# Patient Record
Sex: MALE | Race: WHITE | NOT HISPANIC OR LATINO | Employment: OTHER | URBAN - METROPOLITAN AREA
[De-identification: names, ages, dates, MRNs, and addresses within clinical notes are randomized per-mention and may not be internally consistent; named-entity substitution may affect disease eponyms.]

---

## 2020-05-28 ENCOUNTER — TELEPHONE (OUTPATIENT)
Dept: OBGYN CLINIC | Facility: CLINIC | Age: 80
End: 2020-05-28

## 2020-07-06 ENCOUNTER — OFFICE VISIT (OUTPATIENT)
Dept: OBGYN CLINIC | Facility: CLINIC | Age: 80
End: 2020-07-06
Payer: COMMERCIAL

## 2020-07-06 VITALS
SYSTOLIC BLOOD PRESSURE: 156 MMHG | HEIGHT: 70 IN | DIASTOLIC BLOOD PRESSURE: 86 MMHG | WEIGHT: 188 LBS | TEMPERATURE: 99.5 F | BODY MASS INDEX: 26.92 KG/M2

## 2020-07-06 DIAGNOSIS — M17.11 PRIMARY OSTEOARTHRITIS OF RIGHT KNEE: Primary | ICD-10-CM

## 2020-07-06 PROCEDURE — 99213 OFFICE O/P EST LOW 20 MIN: CPT | Performed by: ORTHOPAEDIC SURGERY

## 2020-07-06 PROCEDURE — 20610 DRAIN/INJ JOINT/BURSA W/O US: CPT | Performed by: ORTHOPAEDIC SURGERY

## 2020-07-06 RX ORDER — METHYLPREDNISOLONE ACETATE 40 MG/ML
2 INJECTION, SUSPENSION INTRA-ARTICULAR; INTRALESIONAL; INTRAMUSCULAR; SOFT TISSUE
Status: COMPLETED | OUTPATIENT
Start: 2020-07-06 | End: 2020-07-06

## 2020-07-06 RX ORDER — LIDOCAINE HYDROCHLORIDE 10 MG/ML
4 INJECTION, SOLUTION INFILTRATION; PERINEURAL
Status: COMPLETED | OUTPATIENT
Start: 2020-07-06 | End: 2020-07-06

## 2020-07-06 RX ORDER — ASPIRIN 81 MG/1
TABLET, CHEWABLE ORAL
COMMUNITY
End: 2022-04-21 | Stop reason: HOSPADM

## 2020-07-06 RX ORDER — PRAVASTATIN SODIUM 40 MG
TABLET ORAL
COMMUNITY

## 2020-07-06 RX ADMIN — LIDOCAINE HYDROCHLORIDE 4 ML: 10 INJECTION, SOLUTION INFILTRATION; PERINEURAL at 11:00

## 2020-07-06 RX ADMIN — METHYLPREDNISOLONE ACETATE 2 ML: 40 INJECTION, SUSPENSION INTRA-ARTICULAR; INTRALESIONAL; INTRAMUSCULAR; SOFT TISSUE at 11:00

## 2020-07-06 NOTE — PROGRESS NOTES
Assessment/Plan:  Assessment/Plan   There are no diagnoses linked to this encounter  Discussed today's physical exam findings with patient  His findings are consistent with flare up of primary osteoarthritis of the right knee  Patient elects to continue with conservative management via corticosteroid injection  Injection was provided without difficulty  Patient will be seen for follow-up in 3 months for repeat evaluation and consideration for repeat injection if necessary  Subjective:   Patient ID: Vikash Walsh is a 78 y o  male  HPI  Patient presents for follow-up evaluation of right knee primary osteoarthritis  Patient reports that over the last couple weeks he has had a return of symptoms, mainly pain with weight-bearing activity  He denies any bruising, swelling, numbness, tingling, or locking  He reports occasional feelings of weakness  He presents for routine quarterly steroid injection  The following portions of the patient's history were reviewed and updated as appropriate: allergies, current medications, past family history, past medical history, past social history, past surgical history and problem list     There is no problem list on file for this patient  Past Medical History:   Diagnosis Date    Hx of blood clots     Hyperlipidemia     Hypertension      Past Surgical History:   Procedure Laterality Date    KNEE ARTHROSCOPY Right 07/31/2015    PROSTATE SURGERY       History reviewed  No pertinent family history      Social History     Socioeconomic History    Marital status: /Civil Union     Spouse name: None    Number of children: None    Years of education: None    Highest education level: None   Occupational History    None   Social Needs    Financial resource strain: None    Food insecurity:     Worry: None     Inability: None    Transportation needs:     Medical: None     Non-medical: None   Tobacco Use    Smoking status: Former Smoker     Last attempt to quit: 1979     Years since quittin 5    Smokeless tobacco: Never Used   Substance and Sexual Activity    Alcohol use: Not Currently    Drug use: Not Currently    Sexual activity: None   Lifestyle    Physical activity:     Days per week: None     Minutes per session: None    Stress: None   Relationships    Social connections:     Talks on phone: None     Gets together: None     Attends Jewish service: None     Active member of club or organization: None     Attends meetings of clubs or organizations: None     Relationship status: None    Intimate partner violence:     Fear of current or ex partner: None     Emotionally abused: None     Physically abused: None     Forced sexual activity: None   Other Topics Concern    None   Social History Narrative    None       Current Outpatient Medications:     aspirin 81 mg chewable tablet, TAKE 81MG BY MOUTH EVERY DAY, Disp: , Rfl:     pravastatin (PRAVACHOL) 40 mg tablet, TAKE ONE TABLET BY MOUTH AT BEDTIME INSTEAD OF FLUVASTATIN FOR CHOLESTEROL, Disp: , Rfl:     Allergies no known allergies      Review of Systems   Constitutional: Negative for chills, fever and unexpected weight change  HENT: Negative for hearing loss, nosebleeds and sore throat  Eyes: Negative for pain, redness and visual disturbance  Respiratory: Negative for cough, shortness of breath and wheezing  Cardiovascular: Negative for chest pain, palpitations and leg swelling  Gastrointestinal: Negative for abdominal pain, nausea and vomiting  Endocrine: Negative for polydipsia and polyuria  Genitourinary: Negative for dysuria and hematuria  Musculoskeletal:        As noted in HPI   Skin: Negative for rash and wound  Neurological: Negative for dizziness, numbness and headaches  Psychiatric/Behavioral: Negative for decreased concentration and suicidal ideas  The patient is not nervous/anxious          Objective:  /86 (BP Location: Right arm, Patient Position: Sitting, Cuff Size: Large)   Temp 99 5 °F (37 5 °C)   Ht 5' 10" (1 778 m)   Wt 85 3 kg (188 lb)   BMI 26 98 kg/m²     Ortho Exam   Right knee -   No obvious anatomical deformity  Skin is warm dry to touch with no signs of erythema, ecchymosis, infection  Patella tracks centrally with palpable crepitus  Mild medial and lateral joint line tenderness  Knee is stable to varus, valgus, anterior, posterior stress  Calf compartments are soft  2+ TP pulse  Sensation light touch intact distally    Physical Exam   Constitutional: He is oriented to person, place, and time  He appears well-developed and well-nourished  HENT:   Right Ear: External ear normal    Left Ear: External ear normal    Nose: Nose normal    Eyes: Pupils are equal, round, and reactive to light  Conjunctivae and EOM are normal    Neck: Normal range of motion  Cardiovascular: Intact distal pulses  Pulmonary/Chest: Effort normal    Musculoskeletal:    As noted in HPI   Neurological: He is alert and oriented to person, place, and time  Skin: Skin is warm and dry  Psychiatric: He has a normal mood and affect   His behavior is normal  Judgment and thought content normal        No new imaging reviewed this visit     Large joint arthrocentesis: R knee  Date/Time: 7/6/2020 11:00 AM  Consent given by: patient  Site marked: site marked  Supporting Documentation  Indications: pain   Procedure Details  Location: knee - R knee  Needle size: 22 G  Ultrasound guidance: no  Approach: anterolateral  Medications administered: 4 mL lidocaine 1 %; 2 mL methylPREDNISolone acetate 40 mg/mL    Patient tolerance: patient tolerated the procedure well with no immediate complications  Dressing:  Sterile dressing applied          Scribe Attestation    I,:   Juan C Tidwell am acting as a scribe while in the presence of the attending physician :        I,:   Beauford Brittle, DO personally performed the services described in this documentation    as scribed in my presence :

## 2020-10-05 ENCOUNTER — OFFICE VISIT (OUTPATIENT)
Dept: OBGYN CLINIC | Facility: CLINIC | Age: 80
End: 2020-10-05
Payer: COMMERCIAL

## 2020-10-05 VITALS
TEMPERATURE: 97.5 F | SYSTOLIC BLOOD PRESSURE: 160 MMHG | BODY MASS INDEX: 26.92 KG/M2 | DIASTOLIC BLOOD PRESSURE: 78 MMHG | HEIGHT: 70 IN | WEIGHT: 188 LBS

## 2020-10-05 DIAGNOSIS — M70.62 TROCHANTERIC BURSITIS OF LEFT HIP: Primary | ICD-10-CM

## 2020-10-05 PROCEDURE — 99213 OFFICE O/P EST LOW 20 MIN: CPT | Performed by: ORTHOPAEDIC SURGERY

## 2020-10-05 PROCEDURE — 20610 DRAIN/INJ JOINT/BURSA W/O US: CPT | Performed by: ORTHOPAEDIC SURGERY

## 2020-10-05 RX ORDER — MAGNESIUM 30 MG
30 TABLET ORAL DAILY
COMMUNITY

## 2020-10-05 RX ORDER — METHYLPREDNISOLONE ACETATE 40 MG/ML
2 INJECTION, SUSPENSION INTRA-ARTICULAR; INTRALESIONAL; INTRAMUSCULAR; SOFT TISSUE
Status: COMPLETED | OUTPATIENT
Start: 2020-10-05 | End: 2020-10-05

## 2020-10-05 RX ORDER — OMEGA-3S/DHA/EPA/FISH OIL/D3 300MG-1000
800 CAPSULE ORAL DAILY
COMMUNITY

## 2020-10-05 RX ORDER — BUPIVACAINE HYDROCHLORIDE 2.5 MG/ML
4 INJECTION, SOLUTION INFILTRATION; PERINEURAL
Status: COMPLETED | OUTPATIENT
Start: 2020-10-05 | End: 2020-10-05

## 2020-10-05 RX ADMIN — BUPIVACAINE HYDROCHLORIDE 4 ML: 2.5 INJECTION, SOLUTION INFILTRATION; PERINEURAL at 08:44

## 2020-10-05 RX ADMIN — METHYLPREDNISOLONE ACETATE 2 ML: 40 INJECTION, SUSPENSION INTRA-ARTICULAR; INTRALESIONAL; INTRAMUSCULAR; SOFT TISSUE at 08:44

## 2020-12-22 ENCOUNTER — OFFICE VISIT (OUTPATIENT)
Dept: OBGYN CLINIC | Facility: CLINIC | Age: 80
End: 2020-12-22
Payer: COMMERCIAL

## 2020-12-22 ENCOUNTER — APPOINTMENT (OUTPATIENT)
Dept: RADIOLOGY | Facility: CLINIC | Age: 80
End: 2020-12-22
Payer: COMMERCIAL

## 2020-12-22 VITALS
TEMPERATURE: 98 F | HEART RATE: 57 BPM | SYSTOLIC BLOOD PRESSURE: 173 MMHG | HEIGHT: 70 IN | WEIGHT: 188 LBS | DIASTOLIC BLOOD PRESSURE: 75 MMHG | BODY MASS INDEX: 26.92 KG/M2

## 2020-12-22 DIAGNOSIS — M54.2 CERVICALGIA: Primary | ICD-10-CM

## 2020-12-22 DIAGNOSIS — M54.2 CERVICALGIA: ICD-10-CM

## 2020-12-22 PROCEDURE — 72040 X-RAY EXAM NECK SPINE 2-3 VW: CPT

## 2020-12-22 PROCEDURE — 99214 OFFICE O/P EST MOD 30 MIN: CPT | Performed by: FAMILY MEDICINE

## 2021-02-04 ENCOUNTER — OFFICE VISIT (OUTPATIENT)
Dept: FAMILY MEDICINE CLINIC | Facility: CLINIC | Age: 81
End: 2021-02-04
Payer: COMMERCIAL

## 2021-02-04 VITALS
DIASTOLIC BLOOD PRESSURE: 74 MMHG | SYSTOLIC BLOOD PRESSURE: 148 MMHG | WEIGHT: 185 LBS | BODY MASS INDEX: 28.04 KG/M2 | TEMPERATURE: 98 F | HEART RATE: 58 BPM | HEIGHT: 68 IN | OXYGEN SATURATION: 96 % | RESPIRATION RATE: 18 BRPM

## 2021-02-04 DIAGNOSIS — R73.9 HYPERGLYCEMIA: ICD-10-CM

## 2021-02-04 DIAGNOSIS — E78.5 HYPERLIPIDEMIA, UNSPECIFIED HYPERLIPIDEMIA TYPE: ICD-10-CM

## 2021-02-04 DIAGNOSIS — R25.1 TREMOR OF BOTH HANDS: ICD-10-CM

## 2021-02-04 DIAGNOSIS — I10 ESSENTIAL HYPERTENSION: ICD-10-CM

## 2021-02-04 DIAGNOSIS — Z13.29 THYROID DISORDER SCREEN: ICD-10-CM

## 2021-02-04 DIAGNOSIS — Z76.89 ENCOUNTER TO ESTABLISH CARE WITH NEW DOCTOR: Primary | ICD-10-CM

## 2021-02-04 DIAGNOSIS — Z13.0 SCREENING FOR DEFICIENCY ANEMIA: ICD-10-CM

## 2021-02-04 PROCEDURE — 1100F PTFALLS ASSESS-DOCD GE2>/YR: CPT | Performed by: FAMILY MEDICINE

## 2021-02-04 PROCEDURE — 3725F SCREEN DEPRESSION PERFORMED: CPT | Performed by: FAMILY MEDICINE

## 2021-02-04 PROCEDURE — 3288F FALL RISK ASSESSMENT DOCD: CPT | Performed by: FAMILY MEDICINE

## 2021-02-04 PROCEDURE — 99203 OFFICE O/P NEW LOW 30 MIN: CPT | Performed by: FAMILY MEDICINE

## 2021-02-04 RX ORDER — LISINOPRIL AND HYDROCHLOROTHIAZIDE 25; 20 MG/1; MG/1
1 TABLET ORAL DAILY
COMMUNITY

## 2021-02-04 RX ORDER — DILTIAZEM HYDROCHLORIDE 240 MG/1
240 CAPSULE, EXTENDED RELEASE ORAL DAILY
COMMUNITY
End: 2021-09-08 | Stop reason: SDUPTHER

## 2021-02-04 RX ORDER — VITAMIN B COMPLEX
1 CAPSULE ORAL DAILY
COMMUNITY

## 2021-02-04 RX ORDER — CHLORAL HYDRATE 500 MG
2000 CAPSULE ORAL DAILY
COMMUNITY

## 2021-02-04 NOTE — PROGRESS NOTES
Assessment/Plan:         Diagnoses and all orders for this visit:    Encounter to establish care with new doctor    Essential hypertension  Comments:  controlled well, cpm    Hyperlipidemia, unspecified hyperlipidemia type  -     Lipid panel; Future    Tremor of both hands  -     TSH, 3rd generation with Free T4 reflex; Future  -     Vitamin B12; Future    Hyperglycemia  -     Comprehensive metabolic panel; Future  -     HEMOGLOBIN A1C W/ EAG ESTIMATION; Future    BMI 28 0-28 9,adult    Screening for deficiency anemia  -     CBC and differential; Future    Thyroid disorder screen  -     TSH, 3rd generation with Free T4 reflex; Future          Subjective:   Chief Complaint   Patient presents with   [address]     Wanted a PCP close to home, [facility] [address]  Would like COVID vaccine  Patient ID: Conrado Lane is a 78 y o  male  New pt   [facility] PCP through [place] system  last appt at [place] was about 3-4 months ago  last HbA1c 6 1% per pt   "Have floaters" - followed by eye doctor at [facility], "Woke up with a stiff neck and saw dr Casimiro Mccord, said it's arthritis"  "Had an ultrasound on my aorta about 10 years ago- he said it was ok"  Tremor noticeable r>l hand today during visit, pt admits present for about a year, not any worse, no dysfunction assoc, no workup per pt      The following portions of the patient's history were reviewed and updated as appropriate: allergies, current medications, past family history, past medical history, past social history, past surgical history and problem list     Review of Systems   All other systems reviewed and are negative  Objective:      /74 (BP Location: Left arm, Patient Position: Sitting)   Pulse 58   Temp 98 °F (36 7 °C)   Resp 18   Ht 5' 8" (1 727 m)   Wt 83 9 kg (185 lb)   SpO2 96%   BMI 28 13 kg/m²          Physical Exam  Vitals signs and nursing note reviewed     Constitutional:       General: He is not in acute distress  Appearance: He is well-developed  He is not ill-appearing, toxic-appearing or diaphoretic  HENT:      Head: Normocephalic and atraumatic  Eyes:      General: Lids are normal       Conjunctiva/sclera: Conjunctivae normal       Pupils: Pupils are equal, round, and reactive to light  Neck:      Musculoskeletal: Neck supple  Thyroid: No thyroid mass or thyromegaly  Vascular: No JVD  Trachea: Trachea normal    Cardiovascular:      Rate and Rhythm: Normal rate and regular rhythm  Pulses: Normal pulses  Heart sounds: S1 normal and S2 normal  Heart sounds are distant  No friction rub  No gallop  Pulmonary:      Effort: Pulmonary effort is normal       Breath sounds: Normal breath sounds  Abdominal:      General: Bowel sounds are normal  There is no distension or abdominal bruit  Palpations: Abdomen is soft  There is no hepatomegaly, splenomegaly or mass  Tenderness: There is no abdominal tenderness  Musculoskeletal:      Right lower leg: No edema  Left lower leg: No edema  Lymphadenopathy:      Cervical: No cervical adenopathy  Upper Body:      Right upper body: No supraclavicular adenopathy  Left upper body: No supraclavicular adenopathy  Skin:     General: Skin is warm and dry  Coloration: Skin is not pale  Neurological:      Mental Status: He is alert and oriented to person, place, and time  Motor: Tremor (R>L hand) present  Gait: Gait normal    Psychiatric:         Mood and Affect: Mood normal          Behavior: Behavior normal  Behavior is cooperative  BMI Counseling: Body mass index is 28 13 kg/m²  The BMI is above normal  Nutrition recommendations include 3-5 servings of fruits/vegetables daily  Exercise recommendations include exercising 3-5 times per week

## 2021-02-09 ENCOUNTER — IMMUNIZATIONS (OUTPATIENT)
Dept: FAMILY MEDICINE CLINIC | Facility: HOSPITAL | Age: 81
End: 2021-02-09

## 2021-02-09 DIAGNOSIS — Z23 ENCOUNTER FOR IMMUNIZATION: Primary | ICD-10-CM

## 2021-02-09 PROCEDURE — 0011A SARS-COV-2 / COVID-19 MRNA VACCINE (MODERNA) 100 MCG: CPT

## 2021-02-09 PROCEDURE — 91301 SARS-COV-2 / COVID-19 MRNA VACCINE (MODERNA) 100 MCG: CPT

## 2021-02-17 ENCOUNTER — LAB (OUTPATIENT)
Dept: LAB | Facility: CLINIC | Age: 81
End: 2021-02-17
Payer: COMMERCIAL

## 2021-02-17 DIAGNOSIS — E78.5 HYPERLIPIDEMIA, UNSPECIFIED HYPERLIPIDEMIA TYPE: ICD-10-CM

## 2021-02-17 DIAGNOSIS — R73.9 HYPERGLYCEMIA: ICD-10-CM

## 2021-02-17 DIAGNOSIS — Z13.0 SCREENING FOR DEFICIENCY ANEMIA: ICD-10-CM

## 2021-02-17 DIAGNOSIS — R25.1 TREMOR OF BOTH HANDS: ICD-10-CM

## 2021-02-17 DIAGNOSIS — Z13.29 THYROID DISORDER SCREEN: ICD-10-CM

## 2021-02-17 LAB
ALBUMIN SERPL BCP-MCNC: 3.8 G/DL (ref 3.5–5)
ALP SERPL-CCNC: 55 U/L (ref 46–116)
ALT SERPL W P-5'-P-CCNC: 27 U/L (ref 12–78)
ANION GAP SERPL CALCULATED.3IONS-SCNC: 4 MMOL/L (ref 4–13)
AST SERPL W P-5'-P-CCNC: 22 U/L (ref 5–45)
BASOPHILS # BLD AUTO: 0.12 THOUSANDS/ΜL (ref 0–0.1)
BASOPHILS NFR BLD AUTO: 1 % (ref 0–1)
BILIRUB SERPL-MCNC: 0.61 MG/DL (ref 0.2–1)
BUN SERPL-MCNC: 25 MG/DL (ref 5–25)
CALCIUM SERPL-MCNC: 9.6 MG/DL (ref 8.3–10.1)
CHLORIDE SERPL-SCNC: 105 MMOL/L (ref 100–108)
CHOLEST SERPL-MCNC: 160 MG/DL (ref 50–200)
CO2 SERPL-SCNC: 32 MMOL/L (ref 21–32)
CREAT SERPL-MCNC: 1.24 MG/DL (ref 0.6–1.3)
EOSINOPHIL # BLD AUTO: 1.47 THOUSAND/ΜL (ref 0–0.61)
EOSINOPHIL NFR BLD AUTO: 16 % (ref 0–6)
ERYTHROCYTE [DISTWIDTH] IN BLOOD BY AUTOMATED COUNT: 13.2 % (ref 11.6–15.1)
EST. AVERAGE GLUCOSE BLD GHB EST-MCNC: 126 MG/DL
GFR SERPL CREATININE-BSD FRML MDRD: 54 ML/MIN/1.73SQ M
GLUCOSE P FAST SERPL-MCNC: 105 MG/DL (ref 65–99)
HBA1C MFR BLD: 6 %
HCT VFR BLD AUTO: 49.3 % (ref 36.5–49.3)
HDLC SERPL-MCNC: 63 MG/DL
HGB BLD-MCNC: 16.1 G/DL (ref 12–17)
IMM GRANULOCYTES # BLD AUTO: 0.02 THOUSAND/UL (ref 0–0.2)
IMM GRANULOCYTES NFR BLD AUTO: 0 % (ref 0–2)
LDLC SERPL CALC-MCNC: 82 MG/DL (ref 0–100)
LYMPHOCYTES # BLD AUTO: 1.73 THOUSANDS/ΜL (ref 0.6–4.47)
LYMPHOCYTES NFR BLD AUTO: 19 % (ref 14–44)
MCH RBC QN AUTO: 33.5 PG (ref 26.8–34.3)
MCHC RBC AUTO-ENTMCNC: 32.7 G/DL (ref 31.4–37.4)
MCV RBC AUTO: 103 FL (ref 82–98)
MONOCYTES # BLD AUTO: 0.82 THOUSAND/ΜL (ref 0.17–1.22)
MONOCYTES NFR BLD AUTO: 9 % (ref 4–12)
NEUTROPHILS # BLD AUTO: 4.86 THOUSANDS/ΜL (ref 1.85–7.62)
NEUTS SEG NFR BLD AUTO: 55 % (ref 43–75)
NONHDLC SERPL-MCNC: 97 MG/DL
NRBC BLD AUTO-RTO: 0 /100 WBCS
PLATELET # BLD AUTO: 170 THOUSANDS/UL (ref 149–390)
PMV BLD AUTO: 11.9 FL (ref 8.9–12.7)
POTASSIUM SERPL-SCNC: 3.7 MMOL/L (ref 3.5–5.3)
PROT SERPL-MCNC: 7.1 G/DL (ref 6.4–8.2)
RBC # BLD AUTO: 4.81 MILLION/UL (ref 3.88–5.62)
SODIUM SERPL-SCNC: 141 MMOL/L (ref 136–145)
TRIGL SERPL-MCNC: 75 MG/DL
TSH SERPL DL<=0.05 MIU/L-ACNC: 3.18 UIU/ML (ref 0.36–3.74)
VIT B12 SERPL-MCNC: 825 PG/ML (ref 100–900)
WBC # BLD AUTO: 9.02 THOUSAND/UL (ref 4.31–10.16)

## 2021-02-17 PROCEDURE — 36415 COLL VENOUS BLD VENIPUNCTURE: CPT

## 2021-02-17 PROCEDURE — 80053 COMPREHEN METABOLIC PANEL: CPT

## 2021-02-17 PROCEDURE — 84443 ASSAY THYROID STIM HORMONE: CPT

## 2021-02-17 PROCEDURE — 85025 COMPLETE CBC W/AUTO DIFF WBC: CPT

## 2021-02-17 PROCEDURE — 82607 VITAMIN B-12: CPT

## 2021-02-17 PROCEDURE — 80061 LIPID PANEL: CPT

## 2021-02-17 PROCEDURE — 83036 HEMOGLOBIN GLYCOSYLATED A1C: CPT

## 2021-03-03 ENCOUNTER — OFFICE VISIT (OUTPATIENT)
Dept: FAMILY MEDICINE CLINIC | Facility: CLINIC | Age: 81
End: 2021-03-03
Payer: COMMERCIAL

## 2021-03-03 VITALS
HEIGHT: 68 IN | DIASTOLIC BLOOD PRESSURE: 72 MMHG | WEIGHT: 184 LBS | BODY MASS INDEX: 27.89 KG/M2 | SYSTOLIC BLOOD PRESSURE: 122 MMHG | TEMPERATURE: 98.1 F | OXYGEN SATURATION: 94 % | HEART RATE: 58 BPM

## 2021-03-03 DIAGNOSIS — I10 ESSENTIAL HYPERTENSION: ICD-10-CM

## 2021-03-03 DIAGNOSIS — Z00.00 MEDICARE ANNUAL WELLNESS VISIT, SUBSEQUENT: Primary | ICD-10-CM

## 2021-03-03 DIAGNOSIS — E78.5 HYPERLIPIDEMIA, UNSPECIFIED HYPERLIPIDEMIA TYPE: ICD-10-CM

## 2021-03-03 DIAGNOSIS — R73.9 HYPERGLYCEMIA: ICD-10-CM

## 2021-03-03 PROCEDURE — 1125F AMNT PAIN NOTED PAIN PRSNT: CPT | Performed by: FAMILY MEDICINE

## 2021-03-03 PROCEDURE — 3078F DIAST BP <80 MM HG: CPT | Performed by: FAMILY MEDICINE

## 2021-03-03 PROCEDURE — 3074F SYST BP LT 130 MM HG: CPT | Performed by: FAMILY MEDICINE

## 2021-03-03 PROCEDURE — 1160F RVW MEDS BY RX/DR IN RCRD: CPT | Performed by: FAMILY MEDICINE

## 2021-03-03 PROCEDURE — 3288F FALL RISK ASSESSMENT DOCD: CPT | Performed by: FAMILY MEDICINE

## 2021-03-03 PROCEDURE — G0439 PPPS, SUBSEQ VISIT: HCPCS | Performed by: FAMILY MEDICINE

## 2021-03-03 PROCEDURE — 1170F FXNL STATUS ASSESSED: CPT | Performed by: FAMILY MEDICINE

## 2021-03-03 PROCEDURE — 1036F TOBACCO NON-USER: CPT | Performed by: FAMILY MEDICINE

## 2021-03-03 PROCEDURE — 99214 OFFICE O/P EST MOD 30 MIN: CPT | Performed by: FAMILY MEDICINE

## 2021-03-03 NOTE — PROGRESS NOTES
Assessment and Plan:     Problem List Items Addressed This Visit     None           Preventive health issues were discussed with patient, and age appropriate screening tests were ordered as noted in patient's After Visit Summary  Personalized health advice and appropriate referrals for health education or preventive services given if needed, as noted in patient's After Visit Summary       History of Present Illness:     Patient presents for Medicare Annual Wellness visit    Patient Care Team:  Wilfred Chisholm DO as PCP - General (Family Medicine)     Problem List:     Patient Active Problem List   Diagnosis    Trochanteric bursitis of left hip    Hypertension    Hyperlipidemia    History of deep venous thrombosis (DVT) of distal vein of right lower extremity    Hyperglycemia    Tremor of both hands    BMI 28 0-28 9,adult      Past Medical and Surgical History:     Past Medical History:   Diagnosis Date    Hx of blood clots     Hyperlipidemia     Hypertension      Past Surgical History:   Procedure Laterality Date    HERNIA REPAIR      KNEE ARTHROSCOPY Right 2015    PROSTATE SURGERY        Family History:     Family History   Problem Relation Age of Onset    Lung cancer Mother     Ulcers Father     No Known Problems Sister     Heart attack Brother     Alcohol abuse Brother     Kidney disease Brother       Social History:        Social History     Socioeconomic History    Marital status: /Civil Union     Spouse name: None    Number of children: None    Years of education: None    Highest education level: None   Occupational History    None   Social Needs    Financial resource strain: None    Food insecurity     Worry: None     Inability: None    Transportation needs     Medical: No     Non-medical: No   Tobacco Use    Smoking status: Former Smoker     Quit date:      Years since quittin 1    Smokeless tobacco: Never Used    Tobacco comment: 2+ppd x 20 years Substance and Sexual Activity    Alcohol use: Not Currently    Drug use: Not Currently    Sexual activity: Not Currently   Lifestyle    Physical activity     Days per week: None     Minutes per session: None    Stress: None   Relationships    Social connections     Talks on phone: None     Gets together: None     Attends Shinto service: None     Active member of club or organization: None     Attends meetings of clubs or organizations: None     Relationship status: None    Intimate partner violence     Fear of current or ex partner: None     Emotionally abused: None     Physically abused: None     Forced sexual activity: None   Other Topics Concern    None   Social History Narrative    Retired, lives with his wife, boubacar and Maame [employer] service [facility] [id number]      Medications and Allergies:     Current Outpatient Medications   Medication Sig Dispense Refill    ascorbic Acid (VITAMIN C) 500 MG CPCR Take 500 mg by mouth daily      aspirin 81 mg chewable tablet TAKE 81MG BY MOUTH EVERY DAY      b complex vitamins capsule Take 1 capsule by mouth daily      Boswellia-Glucosamine-Vit D (OSTEO BI-FLEX ONE PER DAY PO) Take by mouth      cholecalciferol (VITAMIN D3) 400 units tablet Take 800 Units by mouth daily       diltiazem (DILACOR XR) 240 MG 24 hr capsule Take 240 mg by mouth daily      lisinopril-hydrochlorothiazide (PRINZIDE,ZESTORETIC) 20-25 MG per tablet Take 1 tablet by mouth daily      magnesium 30 MG tablet Take 30 mg by mouth daily      Omega-3 Fatty Acids (fish oil) 1,000 mg Take 2,000 mg by mouth daily      Potassium 99 MG TABS Take 1 tablet by mouth daily      pravastatin (PRAVACHOL) 40 mg tablet TAKE ONE TABLET BY MOUTH AT BEDTIME INSTEAD OF FLUVASTATIN FOR CHOLESTEROL       No current facility-administered medications for this visit        No Known Allergies   Immunizations:     Immunization History   Administered Date(s) Administered    H1N1, All Formulations 04/29/2010    INFLUENZA 01/13/2003, 09/30/2014, 09/24/2020    Influenza Quadrivalent Preservative Free 3 years and older IM 09/24/2020    Influenza, seasonal, injectable, preservative free 10/03/2017    Pneumococcal 11/30/2004    Pneumococcal Conjugate 13-Valent 02/09/2016    SARS-CoV-2 / COVID-19 mRNA IM (Moderna) 02/09/2021    Tdap 03/10/2014    Zoster 04/03/2013      Health Maintenance: There are no preventive care reminders to display for this patient  Topic Date Due    Pneumococcal Vaccine: 65+ Years (2 of 2 - PPSV23) 02/09/2017      Medicare Health Risk Assessment:     /72 (BP Location: Left arm, Patient Position: Sitting, Cuff Size: Standard)   Pulse 58   Temp 98 1 °F (36 7 °C) (Tympanic)   Ht 5' 8" (1 727 m)   Wt 83 5 kg (184 lb)   SpO2 94%   BMI 27 98 kg/m²          Health Risk Assessment:   Patient rates overall health as very good  Patient feels that their physical health rating is slightly better  Eyesight was rated as same  Hearing was rated as same  Patient feels that their emotional and mental health rating is same  Pain experienced in the last 7 days has been some  Patient's pain rating has been 9/10  Patient states that he has experienced no weight loss or gain in last 6 months  Fall Risk Screening: In the past year, patient has experienced: no history of falling in past year      Home Safety:  Patient has trouble with stairs inside or outside of their home  Patient has working smoke alarms and has working carbon monoxide detector  Home safety hazards include: none  Nutrition:   Current diet is Regular  Medications:   Patient is currently taking over-the-counter supplements  OTC medications include: see medication list  Patient is able to manage medications       Activities of Daily Living (ADLs)/Instrumental Activities of Daily Living (IADLs):   Walk and transfer into and out of bed and chair?: Yes  Dress and groom yourself?: Yes    Bathe or shower yourself?: Yes    Feed yourself?  Yes  Do your laundry/housekeeping?: Yes  Manage your money, pay your bills and track your expenses?: Yes  Make your own meals?: Yes    Do your own shopping?: Yes    Previous Hospitalizations:   Any hospitalizations or ED visits within the last 12 months?: No      Advance Care Planning:   Living will: Yes    Advanced directive: Yes      Cognitive Screening:   Provider or family/friend/caregiver concerned regarding cognition?: No    PREVENTIVE SCREENINGS      Cardiovascular Screening:    General: Screening Not Indicated and History Lipid Disorder      Diabetes Screening:     General: Screening Current      Colorectal Cancer Screening:     General: Screening Not Indicated      Prostate Cancer Screening:    General: Screening Not Indicated      Osteoporosis Screening:    General: Screening Not Indicated      Abdominal Aortic Aneurysm (AAA) Screening:    Risk factors include: tobacco use        General: Screening Not Indicated      Lung Cancer Screening:     General: Screening Not Indicated      Hepatitis C Screening:    General: Screening Not Indicated      Olga Lidia Hester DO

## 2021-03-03 NOTE — PATIENT INSTRUCTIONS

## 2021-03-03 NOTE — PROGRESS NOTES
Assessment/Plan:         Diagnoses and all orders for this visit:    Medicare annual wellness visit, subsequent    Essential hypertension  Comments:  well controlled, cpm    Hyperglycemia  Comments:  diet control    Hyperlipidemia, unspecified hyperlipidemia type  Comments:  controlled on pravachol, cpm          Subjective:   Chief Complaint   Patient presents with    Medicare Wellness Visit     No concerns today  No refills needed  Patient ID: Emi Chen is a 78 y o  male  Here for medicare wellness and f/u  pt still drives without problems- "try to exercise a little bit every day, too, have a weight lift machine, and dumbbells, and a stationary bike"      The following portions of the patient's history were reviewed and updated as appropriate: allergies, current medications, past family history, past medical history, past social history, past surgical history and problem list     Review of Systems   All other systems reviewed and are negative  Objective:      /72 (BP Location: Left arm, Patient Position: Sitting, Cuff Size: Standard)   Pulse 58   Temp 98 1 °F (36 7 °C) (Tympanic)   Ht 5' 8" (1 727 m)   Wt 83 5 kg (184 lb)   SpO2 94%   BMI 27 98 kg/m²          Physical Exam  Vitals signs and nursing note reviewed  Constitutional:       General: He is not in acute distress  Appearance: He is well-developed  He is not ill-appearing, toxic-appearing or diaphoretic  Comments: Appears younger than stated age, physically fit   HENT:      Head: Normocephalic and atraumatic  Eyes:      General: Lids are normal       Conjunctiva/sclera: Conjunctivae normal       Pupils: Pupils are equal, round, and reactive to light  Neck:      Musculoskeletal: Neck supple  Thyroid: No thyroid mass or thyromegaly  Vascular: No JVD  Trachea: Trachea normal    Cardiovascular:      Rate and Rhythm: Normal rate and regular rhythm  Pulses: Normal pulses        Heart sounds: Normal heart sounds  Pulmonary:      Effort: Pulmonary effort is normal       Breath sounds: Normal breath sounds  Abdominal:      General: Bowel sounds are normal  There is no distension or abdominal bruit  Palpations: Abdomen is soft  There is no hepatomegaly, splenomegaly or mass  Tenderness: There is no abdominal tenderness  Musculoskeletal:      Right lower leg: No edema  Left lower leg: No edema  Lymphadenopathy:      Cervical: No cervical adenopathy  Upper Body:      Right upper body: No supraclavicular adenopathy  Left upper body: No supraclavicular adenopathy  Skin:     General: Skin is warm and dry  Coloration: Skin is not pale  Neurological:      Mental Status: He is alert and oriented to person, place, and time  Gait: Gait normal       Deep Tendon Reflexes: Reflexes are normal and symmetric  Psychiatric:         Mood and Affect: Mood normal          Behavior: Behavior normal  Behavior is cooperative           Cognition and Memory: Cognition and memory normal          Judgment: Judgment normal

## 2021-03-09 ENCOUNTER — IMMUNIZATIONS (OUTPATIENT)
Dept: FAMILY MEDICINE CLINIC | Facility: HOSPITAL | Age: 81
End: 2021-03-09

## 2021-03-09 DIAGNOSIS — Z23 ENCOUNTER FOR IMMUNIZATION: Primary | ICD-10-CM

## 2021-03-09 PROCEDURE — 0012A SARS-COV-2 / COVID-19 MRNA VACCINE (MODERNA) 100 MCG: CPT

## 2021-03-09 PROCEDURE — 91301 SARS-COV-2 / COVID-19 MRNA VACCINE (MODERNA) 100 MCG: CPT

## 2021-08-14 ENCOUNTER — OFFICE VISIT (OUTPATIENT)
Dept: URGENT CARE | Facility: CLINIC | Age: 81
End: 2021-08-14
Payer: COMMERCIAL

## 2021-08-14 VITALS
HEIGHT: 68 IN | BODY MASS INDEX: 28.04 KG/M2 | SYSTOLIC BLOOD PRESSURE: 149 MMHG | DIASTOLIC BLOOD PRESSURE: 68 MMHG | TEMPERATURE: 97.8 F | HEART RATE: 59 BPM | RESPIRATION RATE: 18 BRPM | WEIGHT: 185 LBS | OXYGEN SATURATION: 98 %

## 2021-08-14 DIAGNOSIS — R31.9 URINARY TRACT INFECTION WITH HEMATURIA, SITE UNSPECIFIED: Primary | ICD-10-CM

## 2021-08-14 DIAGNOSIS — N39.0 URINARY TRACT INFECTION WITH HEMATURIA, SITE UNSPECIFIED: Primary | ICD-10-CM

## 2021-08-14 LAB
SL AMB  POCT GLUCOSE, UA: NEGATIVE
SL AMB LEUKOCYTE ESTERASE,UA: ABNORMAL
SL AMB POCT BILIRUBIN,UA: NEGATIVE
SL AMB POCT BLOOD,UA: ABNORMAL
SL AMB POCT CLARITY,UA: ABNORMAL
SL AMB POCT COLOR,UA: YELLOW
SL AMB POCT KETONES,UA: ABNORMAL
SL AMB POCT NITRITE,UA: NEGATIVE
SL AMB POCT PH,UA: 5
SL AMB POCT SPECIFIC GRAVITY,UA: 1.01
SL AMB POCT URINE PROTEIN: 30
SL AMB POCT UROBILINOGEN: 0.2

## 2021-08-14 PROCEDURE — 99213 OFFICE O/P EST LOW 20 MIN: CPT | Performed by: PHYSICIAN ASSISTANT

## 2021-08-14 PROCEDURE — 81002 URINALYSIS NONAUTO W/O SCOPE: CPT | Performed by: PHYSICIAN ASSISTANT

## 2021-08-14 PROCEDURE — 87077 CULTURE AEROBIC IDENTIFY: CPT | Performed by: PHYSICIAN ASSISTANT

## 2021-08-14 PROCEDURE — 87086 URINE CULTURE/COLONY COUNT: CPT | Performed by: PHYSICIAN ASSISTANT

## 2021-08-14 PROCEDURE — 87186 SC STD MICRODIL/AGAR DIL: CPT | Performed by: PHYSICIAN ASSISTANT

## 2021-08-14 RX ORDER — SULFAMETHOXAZOLE AND TRIMETHOPRIM 800; 160 MG/1; MG/1
1 TABLET ORAL EVERY 12 HOURS SCHEDULED
Qty: 10 TABLET | Refills: 0 | Status: SHIPPED | OUTPATIENT
Start: 2021-08-14 | End: 2021-08-19

## 2021-08-14 NOTE — PROGRESS NOTES
[facility]      NAME: Valentino Valles is a 78 y o  male  : 1940    MRN: [id number]  DATE: 2021  TIME: 9:01 AM    Assessment and Plan   Urinary tract infection with hematuria, site unspecified [N39 0, R31 9]  1  Urinary tract infection with hematuria, site unspecified  POCT urine dip    Urine culture    sulfamethoxazole-trimethoprim (BACTRIM DS) 800-160 mg per tablet       Patient Instructions   Dysuria   AMBULATORY CARE:   Dysuria  is trouble urinating, or pain, burning, or discomfort when you urinate  Dysuria is usually a symptom of another problem, such as a blockage or urinary tract infection  Common symptoms include the following:   · Fever     · Cloudy, bad smelling urine     · Urge to urinate often but urinating little     · Back, side, or abdominal pain     · Blood in your urine     · Discharge that smells bad     · Itching  Seek care immediately if:   · You have severe back, side, or abdominal pain  · You have fever and shaking chills  · You vomit several times in a row  Contact your healthcare provider if:   · Your symptoms do not go away, even after treatment  · You have questions or concerns about your condition or care  Treatment for dysuria  may include medicines to treat a bacterial infection or help decrease bladder spasms  Manage your dysuria:   · Drink more liquids  Liquids help flush out bacteria that may be causing an infection  Ask your healthcare provider how much liquid to drink each day and which liquids are best for you  · Take sitz baths as directed  Fill a bathtub with 4 to 6 inches of warm water  You may also use a sitz bath pan that fits over a toilet  Sit in the sitz bath for 20 minutes  Do this 2 to 3 times a day, or as directed  The warm water can help decrease pain and swelling  Follow up with your healthcare provider as directed:  Write down your questions so you remember to ask them during your visits     © 2017 [facility] is for End User's use only and may not be sold, redistributed or otherwise used for commercial purposes  All illustrations and images included in CareNotes® are the copyrighted property of A D A M , Inc  or Samuel Vora  The above information is an  only  It is not intended as medical advice for individual conditions or treatments  Talk to your doctor, nurse or pharmacist before following any medical regimen to see if it is safe and effective for you  I have prescribed an antibiotic for the infection  Please take the antibiotic as prescribed and finish the entire prescription  I recommend that the patient takes an over the counter probiotic or eats yogurt with live cultures in it [place]) to keep good bacteria in the gut and help prevent diarrhea  If not improving over the next 3-5 days, follow up with PCP  To present to the ER if symptoms worsen  Chief Complaint     Chief Complaint   Patient presents with    Urinary Tract Infection     painful and urgent urineation started 9 days ago  afebrile         History of Present Illness   Rosalea Cockayne presents to the clinic c/o    No concern for UTIs at present  Urinary Frequency   This is a new problem  The current episode started 1 to 4 weeks ago  The problem occurs every urination  The problem has been unchanged  The quality of the pain is described as burning  The pain is mild  There has been no fever  Associated symptoms include frequency and urgency  Pertinent negatives include no chills, discharge, flank pain, hesitancy, nausea or vomiting  He has tried nothing for the symptoms  The treatment provided no relief  There is no history of recurrent UTIs  Review of Systems   Review of Systems   Constitutional: Negative for chills, diaphoresis, fatigue and fever  HENT: Negative for congestion, ear discharge, ear pain and facial swelling      Eyes: Negative for photophobia, pain, discharge, redness, itching and visual disturbance  Respiratory: Negative for apnea, cough, chest tightness, shortness of breath and wheezing  Cardiovascular: Negative for chest pain and palpitations  Gastrointestinal: Negative for abdominal pain, nausea and vomiting  Genitourinary: Positive for difficulty urinating, dysuria, frequency and urgency  Negative for discharge, flank pain, hesitancy, penile pain, penile swelling, scrotal swelling and testicular pain  Skin: Negative for color change, rash and wound  Neurological: Negative for dizziness and headaches  Hematological: Negative for adenopathy           Current Medications     Long-Term Medications   Medication Sig Dispense Refill    ascorbic Acid (VITAMIN C) 500 MG CPCR Take 500 mg by mouth daily      aspirin 81 mg chewable tablet TAKE 81MG BY MOUTH EVERY DAY      b complex vitamins capsule Take 1 capsule by mouth daily      cholecalciferol (VITAMIN D3) 400 units tablet Take 800 Units by mouth daily       diltiazem (DILACOR XR) 240 MG 24 hr capsule Take 240 mg by mouth daily      lisinopril-hydrochlorothiazide (PRINZIDE,ZESTORETIC) 20-25 MG per tablet Take 1 tablet by mouth daily      magnesium 30 MG tablet Take 30 mg by mouth daily      Omega-3 Fatty Acids (fish oil) 1,000 mg Take 2,000 mg by mouth daily      Potassium 99 MG TABS Take 1 tablet by mouth daily      pravastatin (PRAVACHOL) 40 mg tablet TAKE ONE TABLET BY MOUTH AT BEDTIME INSTEAD OF FLUVASTATIN FOR CHOLESTEROL         Current Allergies     Allergies as of 08/14/2021    (No Known Allergies)            The following portions of the patient's history were reviewed and updated as appropriate: allergies, current medications, past family history, past medical history, past social history, past surgical history and problem list   Past Medical History:   Diagnosis Date    Hx of blood clots     Hyperlipidemia     Hypertension      Past Surgical History:   Procedure Laterality Date    HERNIA REPAIR      KNEE ARTHROSCOPY Right 2015    PROSTATE SURGERY       Social History     Socioeconomic History    Marital status: /Civil Union     Spouse name: Not on file    Number of children: Not on file    Years of education: Not on file    Highest education level: Not on file   Occupational History    Not on file   Tobacco Use    Smoking status: Former Smoker     Quit date:      Years since quittin 6    Smokeless tobacco: Never Used    Tobacco comment: 2+ppd x 20 years    Vaping Use    Vaping Use: Never used   Substance and Sexual Activity    Alcohol use: Not Currently    Drug use: Not Currently    Sexual activity: Not Currently   Other Topics Concern    Not on file   Social History Narrative    Retired, lives with his wife, boubacar and great-grandson    [employer] [place] [phone]     Social Determinants of Health     Financial Resource Strain:     Difficulty of Paying Living Expenses:    Food Insecurity:     Worried About [employer] [employer] in the Last Year:    [address] [employer] in the Last Year:    Transportation Needs: No Transportation Needs    Lack of Transportation (Medical): No    Lack of Transportation (Non-Medical):  No   Physical Activity:     Days of Exercise per Week:     Minutes of Exercise per Session:    Stress:     Feeling of Stress :    Social Connections:     Frequency of Communication with Friends and Family:     Frequency of Social Gatherings with Friends and Family:     Attends Muslim Services:     Active Member of Clubs or Organizations:     Attends Club or Organization Meetings:     Marital Status:    Intimate Partner Violence:     Fear of Current or Ex-Partner:     Emotionally Abused:     Physically Abused:     Sexually Abused:        Objective   /68   Pulse 59   Temp 97 8 °F (36 6 °C)   Resp 18   Ht 5' 8" (1 727 m)   Wt 83 9 kg (185 lb)   SpO2 98%   BMI 28 13 kg/m²      Physical Exam     Physical Exam  Vitals and nursing note reviewed  Constitutional:       General: He is not in acute distress  Appearance: He is well-developed  He is not diaphoretic  HENT:      Head: Normocephalic and atraumatic  Right Ear: External ear normal       Left Ear: External ear normal       Nose: Nose normal    Eyes:      General: No scleral icterus  Right eye: No discharge  Left eye: No discharge  Conjunctiva/sclera: Conjunctivae normal    Cardiovascular:      Rate and Rhythm: Normal rate and regular rhythm  Heart sounds: Normal heart sounds  No murmur heard  No friction rub  No gallop  Pulmonary:      Effort: Pulmonary effort is normal  No respiratory distress  Breath sounds: Normal breath sounds  No decreased breath sounds, wheezing, rhonchi or rales  Skin:     General: Skin is warm and dry  Coloration: Skin is not pale  Findings: No erythema or rash  Neurological:      Mental Status: He is alert and oriented to person, place, and time  Psychiatric:         Behavior: Behavior normal          Thought Content:  Thought content normal          Judgment: Judgment normal          Drew Victoria PA-C

## 2021-08-16 LAB — BACTERIA UR CULT: ABNORMAL

## 2021-08-31 ENCOUNTER — RA CDI HCC (OUTPATIENT)
Dept: OTHER | Facility: HOSPITAL | Age: 81
End: 2021-08-31

## 2021-08-31 NOTE — PROGRESS NOTES
[facility]  coding opportunities       Chart reviewed, no opportunity found: CHART REVIEWED, NO OPPORTUNITY FOUND                        Patients insurance company:  [employer] [facility] (Medicare Advantage and Commercial)

## 2021-09-08 ENCOUNTER — OFFICE VISIT (OUTPATIENT)
Dept: FAMILY MEDICINE CLINIC | Facility: CLINIC | Age: 81
End: 2021-09-08
Payer: COMMERCIAL

## 2021-09-08 VITALS
DIASTOLIC BLOOD PRESSURE: 64 MMHG | WEIGHT: 177.4 LBS | SYSTOLIC BLOOD PRESSURE: 140 MMHG | HEART RATE: 75 BPM | TEMPERATURE: 98.6 F | OXYGEN SATURATION: 97 % | BODY MASS INDEX: 26.89 KG/M2 | HEIGHT: 68 IN

## 2021-09-08 DIAGNOSIS — Z86.718 HISTORY OF DEEP VENOUS THROMBOSIS (DVT) OF DISTAL VEIN OF RIGHT LOWER EXTREMITY: ICD-10-CM

## 2021-09-08 DIAGNOSIS — I10 ESSENTIAL HYPERTENSION: Primary | ICD-10-CM

## 2021-09-08 DIAGNOSIS — I73.9 INTERMITTENT CLAUDICATION (HCC): ICD-10-CM

## 2021-09-08 PROCEDURE — 1036F TOBACCO NON-USER: CPT | Performed by: FAMILY MEDICINE

## 2021-09-08 PROCEDURE — 99214 OFFICE O/P EST MOD 30 MIN: CPT | Performed by: FAMILY MEDICINE

## 2021-09-08 PROCEDURE — 1160F RVW MEDS BY RX/DR IN RCRD: CPT | Performed by: FAMILY MEDICINE

## 2021-09-08 RX ORDER — DILTIAZEM HYDROCHLORIDE 240 MG/1
240 CAPSULE, EXTENDED RELEASE ORAL DAILY
Qty: 90 CAPSULE | Refills: 1 | Status: SHIPPED | OUTPATIENT
Start: 2021-09-08

## 2021-09-08 NOTE — PROGRESS NOTES
Assessment/Plan:         Diagnoses and all orders for this visit:    Essential hypertension  -     diltiazem (DILACOR XR) 240 MG 24 hr capsule; Take 1 capsule (240 mg total) by mouth daily  -     VAS MAURY & waveform analysis, multiple levels; Future    Intermittent claudication (HCC)  -     VAS MAURY & waveform analysis, multiple levels; Future    History of deep venous thrombosis (DVT) of distal vein of right lower extremity  Comments:  no evidence of recurrence by history or phys exam          Subjective:   Chief Complaint   Patient presents with    Follow-up     6 mo f/u - no concerns - pt no longer goes to the [place] and needs his meds filled here  Patient ID: Carlota Bonds is a 78 y o  male  Here for scheduled f/u; states he is not going to use the [place] health system any longer, so will need his meds refilled through our office  He c/o "Still have trouble with my legs, can't walk very far, circulation causing it, hurts in the muscle- ongoing for 2-3 years, before that was riding my bike every day 5-10 miles a day, and walking, then hurt my knee and 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 for 4 years"  Denies any peripheral artery testing at [place]      The following portions of the patient's history were reviewed and updated as appropriate: allergies, current medications, past family history, past medical history, past social history, past surgical history and problem list     Review of Systems   Constitutional: Negative  Respiratory: Negative  Cardiovascular: Negative  Gastrointestinal: Negative  Neurological: Negative  Hematological: Negative  Objective:      /64 (BP Location: Left arm, Patient Position: Sitting, Cuff Size: Adult)   Pulse 75   Temp 98 6 °F (37 °C) (Tympanic)   Ht 5' 8" (1 727 m)   Wt 80 5 kg (177 lb 6 4 oz)   SpO2 97%   BMI 26 97 kg/m²          Physical Exam  Vitals and nursing note reviewed  Constitutional:       General: He is not in acute distress  Appearance: He is well-developed  He is not ill-appearing, toxic-appearing or diaphoretic  HENT:      Head: Normocephalic and atraumatic  Mouth/Throat:      Pharynx: Uvula midline  Eyes:      General: Lids are normal       Conjunctiva/sclera: Conjunctivae normal       Pupils: Pupils are equal, round, and reactive to light  Neck:      Thyroid: No thyroid mass or thyromegaly  Vascular: No JVD  Trachea: Trachea normal    Cardiovascular:      Rate and Rhythm: Normal rate and regular rhythm  Pulses: Decreased pulses  Heart sounds: S1 normal and S2 normal  No friction rub  No gallop  Pulmonary:      Effort: Pulmonary effort is normal       Breath sounds: Normal breath sounds  Abdominal:      General: Bowel sounds are normal  There is no distension or abdominal bruit  Palpations: Abdomen is soft  There is no hepatomegaly, splenomegaly or mass  Tenderness: There is no abdominal tenderness  Musculoskeletal:      Cervical back: Neck supple  Right lower leg: No edema  Left lower leg: No edema  Comments: No calf tenderness, negative homans   Lymphadenopathy:      Cervical: No cervical adenopathy  Upper Body:      Right upper body: No supraclavicular adenopathy  Left upper body: No supraclavicular adenopathy  Skin:     General: Skin is warm and dry  Coloration: Skin is not pale  Neurological:      Mental Status: He is alert and oriented to person, place, and time  Gait: Gait normal       Deep Tendon Reflexes: Reflexes are normal and symmetric  Psychiatric:         Mood and Affect: Mood normal          Behavior: Behavior normal  Behavior is cooperative

## 2021-09-27 ENCOUNTER — OFFICE VISIT (OUTPATIENT)
Dept: URGENT CARE | Facility: CLINIC | Age: 81
End: 2021-09-27
Payer: COMMERCIAL

## 2021-09-27 VITALS
WEIGHT: 177 LBS | TEMPERATURE: 98.1 F | OXYGEN SATURATION: 99 % | DIASTOLIC BLOOD PRESSURE: 68 MMHG | BODY MASS INDEX: 26.83 KG/M2 | HEART RATE: 57 BPM | SYSTOLIC BLOOD PRESSURE: 142 MMHG | HEIGHT: 68 IN

## 2021-09-27 DIAGNOSIS — R30.9 PAINFUL URINATION: ICD-10-CM

## 2021-09-27 DIAGNOSIS — N39.0 URINARY TRACT INFECTION WITHOUT HEMATURIA, SITE UNSPECIFIED: Primary | ICD-10-CM

## 2021-09-27 LAB
SL AMB  POCT GLUCOSE, UA: ABNORMAL
SL AMB LEUKOCYTE ESTERASE,UA: ABNORMAL
SL AMB POCT BILIRUBIN,UA: ABNORMAL
SL AMB POCT BLOOD,UA: ABNORMAL
SL AMB POCT CLARITY,UA: ABNORMAL
SL AMB POCT COLOR,UA: YELLOW
SL AMB POCT KETONES,UA: ABNORMAL
SL AMB POCT NITRITE,UA: ABNORMAL
SL AMB POCT PH,UA: 5
SL AMB POCT SPECIFIC GRAVITY,UA: 1.02
SL AMB POCT URINE PROTEIN: ABNORMAL
SL AMB POCT UROBILINOGEN: 0.2

## 2021-09-27 PROCEDURE — 99213 OFFICE O/P EST LOW 20 MIN: CPT | Performed by: NURSE PRACTITIONER

## 2021-09-27 RX ORDER — CEPHALEXIN 500 MG/1
500 CAPSULE ORAL EVERY 12 HOURS SCHEDULED
Qty: 14 CAPSULE | Refills: 0 | Status: SHIPPED | OUTPATIENT
Start: 2021-09-27 | End: 2021-10-04

## 2021-09-27 NOTE — PROGRESS NOTES
[facility]        NAME: Alison Thomas is a 78 y o  male  : 1940    MRN: [id number]  DATE: 2021  TIME: 9:03 AM    Assessment and Plan   Urinary tract infection without hematuria, site unspecified [N39 0]  1  Urinary tract infection without hematuria, site unspecified  cephalexin (KEFLEX) 500 mg capsule   2  Painful urination  POCT urine dip auto non-scope         Patient Instructions     Patient Instructions     Urine dip positive  Will send for culture  Call in 2-3 days for results  Start antibiotic  Take probiotic  Increase oral fluids  Tylenol or Motrin for pain or fever  Azo for bladder spasms  Follow up with PCP if no improvement  Go to ER with abd pain, flank pain or worsening symptoms  Urine dip positive  Will send for culture  Call in 2-3 days for results  Start antibiotic  Take probiotic  Increase oral fluids  Tylenol or Motrin for pain or fever  Follow up with PCP if no improvement  Go to ER with abd pain, flank pain or worsening symptoms  Urinary Tract Infection in Men   WHAT YOU NEED TO KNOW:   A urinary tract infection (UTI) is caused by bacteria that get inside your urinary tract  Most bacteria that enter your urinary tract come out when you urinate  If the bacteria stay in your urinary tract, you may get an infection  Your urinary tract includes your kidneys, ureters, bladder, and urethra  Urine is made in your kidneys, and it flows from the ureters to the bladder  Urine leaves the bladder through the urethra  A UTI is more common in your lower urinary tract, which includes your bladder and urethra  DISCHARGE INSTRUCTIONS:   Return to the emergency department if:   · You are urinating very little or not at all  · You have a high fever with shaking chills  · You have side or back pain that gets worse  Contact your healthcare provider if:   · You have a mild fever      · You do not feel better after 2 days of taking antibiotics  · You are vomiting  · You have new symptoms, such as blood or pus in your urine  · You have questions or concerns about your condition or care  Medicines:   · Antibiotics  help fight a bacterial infection  · Medicines  may be given to decrease pain and burning when you urinate  They will also help decrease the feeling that you need to urinate often  These medicines will make your urine orange or red  · Take your medicine as directed  Contact your healthcare provider if you think your medicine is not helping or if you have side effects  Tell him of her if you are allergic to any medicine  Keep a list of the medicines, vitamins, and herbs you take  Include the amounts, and when and why you take them  Bring the list or the pill bottles to follow-up visits  Carry your medicine list with you in case of an emergency  Prevent another UTI:   · Empty your bladder often  Urinate and empty your bladder as soon as you feel the need  Do not hold your urine for long periods of time  · Drink liquids as directed  Ask how much liquid to drink each day and which liquids are best for you  You may need to drink more liquids than usual to help flush out the bacteria  Do not drink alcohol, caffeine, or citrus juices  These can irritate your bladder and increase your symptoms  Your healthcare provider may recommend cranberry juice to help prevent a UTI  · Urinate after you have sex  This can help flush out bacteria passed during sex  · Do pelvic muscle exercises often  Pelvic muscle exercises may help you start and stop urinating  Strong pelvic muscles may help you empty your bladder easier  Squeeze these muscles tightly for 5 seconds like you are trying to hold back urine  Then relax for 5 seconds  Gradually work up to squeezing for 10 seconds  Do 3 sets of 15 repetitions a day, or as directed      Follow up with your healthcare provider as directed:  Write down your questions so you remember to ask them during your visits  © Copyright [address] 2021 Information is for End User's use only and may not be sold, redistributed or otherwise used for commercial purposes  All illustrations and images included in CareNotes® are the copyrighted property of A D A M , Inc  or [employer]  The above information is an  only  It is not intended as medical advice for individual conditions or treatments  Talk to your doctor, nurse or pharmacist before following any medical regimen to see if it is safe and effective for you  Chief Complaint     Chief Complaint   Patient presents with    Urinary Tract Infection     burning with urination x 2 weeks         History of Present Illness   Michelet Rea presents to the clinic c/o    This is a 78-year-old male here today with complaints of urinary frequency, urgency and burning  He states symptoms started about 2 weeks ago  He denies any nausea or vomiting  He denies any abdominal pain  He states he did have some back pain yesterday but this has resolved  He states he has had UTIs in the past   No fevers body aches or chills  Review of Systems   Review of Systems   Constitutional: Negative for activity change, chills, fatigue and fever  Respiratory: Negative  Gastrointestinal: Negative  Genitourinary: Positive for dysuria, frequency and urgency  Neurological: Negative  Psychiatric/Behavioral: Negative            Current Medications     Long-Term Medications   Medication Sig Dispense Refill    ascorbic Acid (VITAMIN C) 500 MG CPCR Take 500 mg by mouth daily      aspirin 81 mg chewable tablet TAKE 81MG BY MOUTH EVERY DAY      b complex vitamins capsule Take 1 capsule by mouth daily      cholecalciferol (VITAMIN D3) 400 units tablet Take 800 Units by mouth daily       diltiazem (DILACOR XR) 240 MG 24 hr capsule Take 1 capsule (240 mg total) by mouth daily 90 capsule 1    lisinopril-hydrochlorothiazide (PRINZIDE,ZESTORETIC) 20-25 MG per tablet Take 1 tablet by mouth daily      magnesium 30 MG tablet Take 30 mg by mouth daily      Omega-3 Fatty Acids (fish oil) 1,000 mg Take 2,000 mg by mouth daily      Potassium 99 MG TABS Take 1 tablet by mouth daily      pravastatin (PRAVACHOL) 40 mg tablet TAKE ONE TABLET BY MOUTH AT BEDTIME INSTEAD OF FLUVASTATIN FOR CHOLESTEROL         Current Allergies     Allergies as of 09/27/2021    (No Known Allergies)            The following portions of the patient's history were reviewed and updated as appropriate: allergies, current medications, past family history, past medical history, past social history, past surgical history and problem list     Objective   /68 (BP Location: Right arm, Patient Position: Sitting, Cuff Size: Large)   Pulse 57   Temp 98 1 °F (36 7 °C)   Ht 5' 8" (1 727 m)   Wt 80 3 kg (177 lb)   SpO2 99%   BMI 26 91 kg/m²        Physical Exam     Physical Exam  Vitals and nursing note reviewed  Constitutional:       Appearance: Normal appearance  Cardiovascular:      Rate and Rhythm: Normal rate and regular rhythm  Pulses: Normal pulses  Heart sounds: Normal heart sounds  Pulmonary:      Effort: Pulmonary effort is normal       Breath sounds: Normal breath sounds  Neurological:      Mental Status: He is alert and oriented to person, place, and time  Psychiatric:         Mood and Affect: Mood normal          Behavior: Behavior normal          Thought Content:  Thought content normal          Judgment: Judgment normal

## 2021-09-27 NOTE — PATIENT INSTRUCTIONS
Urine dip positive  Will send for culture  Call in 2-3 days for results  Start antibiotic  Take probiotic  Increase oral fluids  Tylenol or Motrin for pain or fever  Azo for bladder spasms  Follow up with PCP if no improvement  Go to ER with abd pain, flank pain or worsening symptoms  Urine dip positive  Will send for culture  Call in 2-3 days for results  Start antibiotic  Take probiotic  Increase oral fluids  Tylenol or Motrin for pain or fever  Follow up with PCP if no improvement  Go to ER with abd pain, flank pain or worsening symptoms  Urinary Tract Infection in Men   WHAT YOU NEED TO KNOW:   A urinary tract infection (UTI) is caused by bacteria that get inside your urinary tract  Most bacteria that enter your urinary tract come out when you urinate  If the bacteria stay in your urinary tract, you may get an infection  Your urinary tract includes your kidneys, ureters, bladder, and urethra  Urine is made in your kidneys, and it flows from the ureters to the bladder  Urine leaves the bladder through the urethra  A UTI is more common in your lower urinary tract, which includes your bladder and urethra  DISCHARGE INSTRUCTIONS:   Return to the emergency department if:   · You are urinating very little or not at all  · You have a high fever with shaking chills  · You have side or back pain that gets worse  Contact your healthcare provider if:   · You have a mild fever  · You do not feel better after 2 days of taking antibiotics  · You are vomiting  · You have new symptoms, such as blood or pus in your urine  · You have questions or concerns about your condition or care  Medicines:   · Antibiotics  help fight a bacterial infection  · Medicines  may be given to decrease pain and burning when you urinate  They will also help decrease the feeling that you need to urinate often  These medicines will make your urine orange or red      · Take your medicine as directed  Contact your healthcare provider if you think your medicine is not helping or if you have side effects  Tell him of her if you are allergic to any medicine  Keep a list of the medicines, vitamins, and herbs you take  Include the amounts, and when and why you take them  Bring the list or the pill bottles to follow-up visits  Carry your medicine list with you in case of an emergency  Prevent another UTI:   · Empty your bladder often  Urinate and empty your bladder as soon as you feel the need  Do not hold your urine for long periods of time  · Drink liquids as directed  Ask how much liquid to drink each day and which liquids are best for you  You may need to drink more liquids than usual to help flush out the bacteria  Do not drink alcohol, caffeine, or citrus juices  These can irritate your bladder and increase your symptoms  Your healthcare provider may recommend cranberry juice to help prevent a UTI  · Urinate after you have sex  This can help flush out bacteria passed during sex  · Do pelvic muscle exercises often  Pelvic muscle exercises may help you start and stop urinating  Strong pelvic muscles may help you empty your bladder easier  Squeeze these muscles tightly for 5 seconds like you are trying to hold back urine  Then relax for 5 seconds  Gradually work up to squeezing for 10 seconds  Do 3 sets of 15 repetitions a day, or as directed  Follow up with your healthcare provider as directed:  Write down your questions so you remember to ask them during your visits  © Copyright [employer] 2021 Information is for End User's use only and may not be sold, redistributed or otherwise used for commercial purposes  All illustrations and images included in CareNotes® are the copyrighted property of A D A M , Inc  or [employer]  The above information is an  only  It is not intended as medical advice for individual conditions or treatments   Talk to your doctor, nurse or pharmacist before following any medical regimen to see if it is safe and effective for you

## 2021-10-27 ENCOUNTER — HOSPITAL ENCOUNTER (OUTPATIENT)
Dept: ULTRASOUND IMAGING | Facility: HOSPITAL | Age: 81
Discharge: HOME | End: 2021-10-27
Attending: UROLOGY
Payer: COMMERCIAL

## 2021-10-27 DIAGNOSIS — N39.0 URINARY TRACT INFECTION, SITE NOT SPECIFIED: ICD-10-CM

## 2021-10-27 PROCEDURE — 76770 US EXAM ABDO BACK WALL COMP: CPT

## 2021-11-05 ENCOUNTER — APPOINTMENT (OUTPATIENT)
Dept: LAB | Facility: CLINIC | Age: 81
End: 2021-11-05
Payer: COMMERCIAL

## 2021-11-05 DIAGNOSIS — C61 MALIGNANT NEOPLASM OF PROSTATE (HCC): ICD-10-CM

## 2021-11-05 LAB — PSA SERPL-MCNC: 1.5 NG/ML (ref 0–4)

## 2021-11-05 PROCEDURE — 36415 COLL VENOUS BLD VENIPUNCTURE: CPT

## 2021-11-05 PROCEDURE — G0103 PSA SCREENING: HCPCS

## 2021-11-23 ENCOUNTER — HOSPITAL ENCOUNTER (OUTPATIENT)
Dept: NON INVASIVE DIAGNOSTICS | Facility: HOSPITAL | Age: 81
Discharge: HOME | End: 2021-11-23
Payer: COMMERCIAL

## 2021-11-23 DIAGNOSIS — I73.9 INTERMITTENT CLAUDICATION (HCC): ICD-10-CM

## 2021-11-23 DIAGNOSIS — I10 ESSENTIAL HYPERTENSION: ICD-10-CM

## 2021-11-23 PROCEDURE — 93923 UPR/LXTR ART STDY 3+ LVLS: CPT

## 2021-11-23 PROCEDURE — 93923 UPR/LXTR ART STDY 3+ LVLS: CPT | Performed by: SURGERY

## 2022-02-28 ENCOUNTER — RA CDI HCC (OUTPATIENT)
Dept: OTHER | Facility: HOSPITAL | Age: 82
End: 2022-02-28

## 2022-02-28 NOTE — PROGRESS NOTES
[facility] 73  coding opportunities       Chart reviewed, no opportunity found: CHART REVIEWED, NO OPPORTUNITY FOUND                        Patients insurance company:  Now In Store [facility] (Medicare Advantage and Commercial)

## 2022-03-09 ENCOUNTER — OFFICE VISIT (OUTPATIENT)
Dept: FAMILY MEDICINE CLINIC | Facility: CLINIC | Age: 82
End: 2022-03-09
Payer: COMMERCIAL

## 2022-03-09 VITALS
TEMPERATURE: 98.4 F | BODY MASS INDEX: 28.88 KG/M2 | OXYGEN SATURATION: 96 % | WEIGHT: 184 LBS | HEIGHT: 67 IN | DIASTOLIC BLOOD PRESSURE: 70 MMHG | HEART RATE: 74 BPM | SYSTOLIC BLOOD PRESSURE: 110 MMHG

## 2022-03-09 DIAGNOSIS — Z86.718 HISTORY OF DEEP VENOUS THROMBOSIS (DVT) OF DISTAL VEIN OF RIGHT LOWER EXTREMITY: ICD-10-CM

## 2022-03-09 DIAGNOSIS — E78.5 HYPERLIPIDEMIA, UNSPECIFIED HYPERLIPIDEMIA TYPE: ICD-10-CM

## 2022-03-09 DIAGNOSIS — I10 ESSENTIAL HYPERTENSION: ICD-10-CM

## 2022-03-09 DIAGNOSIS — Z00.00 MEDICARE ANNUAL WELLNESS VISIT, SUBSEQUENT: Primary | ICD-10-CM

## 2022-03-09 DIAGNOSIS — I73.9 PERIPHERAL VASCULAR DISEASE WITH CLAUDICATION (HCC): ICD-10-CM

## 2022-03-09 PROBLEM — L97.909 PERIPHERAL VASCULAR DISEASE OF LOWER EXTREMITY WITH ULCERATION (HCC): Status: ACTIVE | Noted: 2022-03-09

## 2022-03-09 PROBLEM — L97.909 PERIPHERAL VASCULAR DISEASE OF LOWER EXTREMITY WITH ULCERATION (HCC): Status: RESOLVED | Noted: 2022-03-09 | Resolved: 2022-03-09

## 2022-03-09 PROCEDURE — G0439 PPPS, SUBSEQ VISIT: HCPCS | Performed by: FAMILY MEDICINE

## 2022-03-09 PROCEDURE — 3288F FALL RISK ASSESSMENT DOCD: CPT | Performed by: FAMILY MEDICINE

## 2022-03-09 PROCEDURE — 3074F SYST BP LT 130 MM HG: CPT | Performed by: FAMILY MEDICINE

## 2022-03-09 PROCEDURE — 1160F RVW MEDS BY RX/DR IN RCRD: CPT | Performed by: FAMILY MEDICINE

## 2022-03-09 PROCEDURE — 1170F FXNL STATUS ASSESSED: CPT | Performed by: FAMILY MEDICINE

## 2022-03-09 PROCEDURE — 3078F DIAST BP <80 MM HG: CPT | Performed by: FAMILY MEDICINE

## 2022-03-09 PROCEDURE — 1125F AMNT PAIN NOTED PAIN PRSNT: CPT | Performed by: FAMILY MEDICINE

## 2022-03-09 PROCEDURE — 3725F SCREEN DEPRESSION PERFORMED: CPT | Performed by: FAMILY MEDICINE

## 2022-03-09 PROCEDURE — 99214 OFFICE O/P EST MOD 30 MIN: CPT | Performed by: FAMILY MEDICINE

## 2022-03-09 PROCEDURE — 1100F PTFALLS ASSESS-DOCD GE2>/YR: CPT | Performed by: FAMILY MEDICINE

## 2022-03-09 PROCEDURE — 1036F TOBACCO NON-USER: CPT | Performed by: FAMILY MEDICINE

## 2022-03-09 NOTE — PATIENT INSTRUCTIONS
Medicare Preventive Visit Patient Instructions  Thank you for completing your Welcome to Medicare Visit or Medicare Annual Wellness Visit today  Your next wellness visit will be due in one year (3/10/2023)  The screening/preventive services that you may require over the next 5-10 years are detailed below  Some tests may not apply to you based off risk factors and/or age  Screening tests ordered at today's visit but not completed yet may show as past due  Also, please note that scanned in results may not display below  Preventive Screenings:  Service Recommendations Previous Testing/Comments   Colorectal Cancer Screening  · Colonoscopy    · Fecal Occult Blood Test (FOBT)/Fecal Immunochemical Test (FIT)  · Fecal DNA/Cologuard Test  · Flexible Sigmoidoscopy Age: 54-65 years old   Colonoscopy: every 10 years (May be performed more frequently if at higher risk)  OR  FOBT/FIT: every 1 year  OR  Cologuard: every 3 years  OR  Sigmoidoscopy: every 5 years  Screening may be recommended earlier than age 48 if at higher risk for colorectal cancer  Also, an individualized decision between you and your healthcare provider will decide whether screening between the ages of 74-80 would be appropriate   Colonoscopy: Not on file  FOBT/FIT: Not on file  Cologuard: Not on file  Sigmoidoscopy: Not on file          Prostate Cancer Screening Individualized decision between patient and health care provider in men between ages of 53-78   Medicare will cover every 12 months beginning on the day after your 50th birthday PSA: 1 5 ng/mL     History Prostate Cancer  Screening Not Indicated     Hepatitis C Screening Once for adults born between 1945 and 1965  More frequently in patients at high risk for Hepatitis C Hep C Antibody: Not on file        Diabetes Screening 1-2 times per year if you're at risk for diabetes or have pre-diabetes Fasting glucose: 105 mg/dL   A1C: 6 0 %        Cholesterol Screening Once every 5 years if you don't have a lipid disorder  May order more often based on risk factors  Lipid panel: 02/17/2021    Screening Not Indicated  History Lipid Disorder      Other Preventive Screenings Covered by Medicare:  1  Abdominal Aortic Aneurysm (AAA) Screening: covered once if your at risk  You're considered to be at risk if you have a family history of AAA or a male between the age of 73-68 who smoking at least 100 cigarettes in your lifetime  2  Lung Cancer Screening: covers low dose CT scan once per year if you meet all of the following conditions: (1) Age 50-69; (2) No signs or symptoms of lung cancer; (3) Current smoker or have quit smoking within the last 15 years; (4) You have a tobacco smoking history of at least 30 pack years (packs per day x number of years you smoked); (5) You get a written order from a healthcare provider  3  Glaucoma Screening: covered annually if you're considered high risk: (1) You have diabetes OR (2) Family history of glaucoma OR (3)  aged 48 and older OR (3)  American aged 72 and older  3  Osteoporosis Screening: covered every 2 years if you meet one of the following conditions: (1) Have a vertebral abnormality; (2) On glucocorticoid therapy for more than 3 months; (3) Have primary hyperparathyroidism; (4) On osteoporosis medications and need to assess response to drug therapy  5  HIV Screening: covered annually if you're between the age of 12-76  Also covered annually if you are younger than 13 and older than 72 with risk factors for HIV infection  For pregnant patients, it is covered up to 3 times per pregnancy      Immunizations:  Immunization Recommendations   Influenza Vaccine Annual influenza vaccination during flu season is recommended for all persons aged >= 6 months who do not have contraindications   Pneumococcal Vaccine (Prevnar and Pneumovax)  * Prevnar = PCV13  * Pneumovax = PPSV23 Adults 25-60 years old: 1-3 doses may be recommended based on certain risk factors  Adults 72 years old: Prevnar (PCV13) vaccine recommended followed by Pneumovax (PPSV23) vaccine  If already received PPSV23 since turning 65, then PCV13 recommended at least one year after PPSV23 dose  Hepatitis B Vaccine 3 dose series if at intermediate or high risk (ex: diabetes, end stage renal disease, liver disease)   Tetanus (Td) Vaccine - COST NOT COVERED BY MEDICARE PART B Following completion of primary series, a booster dose should be given every 10 years to maintain immunity against tetanus  Td may also be given as tetanus wound prophylaxis  Tdap Vaccine - COST NOT COVERED BY MEDICARE PART B Recommended at least once for all adults  For pregnant patients, recommended with each pregnancy  Shingles Vaccine (Shingrix) - COST NOT COVERED BY MEDICARE PART B  2 shot series recommended in those aged 48 and above     Health Maintenance Due:  There are no preventive care reminders to display for this patient  Immunizations Due:      Topic Date Due    Pneumococcal Vaccine: 65+ Years (2 of 2 - PPSV23) 02/09/2017    COVID-19 Vaccine (3 - Booster for Moderna series) 08/09/2021    Influenza Vaccine (1) 09/01/2021     Advance Directives   What are advance directives? Advance directives are legal documents that state your wishes and plans for medical care  These plans are made ahead of time in case you lose your ability to make decisions for yourself  Advance directives can apply to any medical decision, such as the treatments you want, and if you want to donate organs  What are the types of advance directives? There are many types of advance directives, and each state has rules about how to use them  You may choose a combination of any of the following:  · Living will: This is a written record of the treatment you want  You can also choose which treatments you do not want, which to limit, and which to stop at a certain time  This includes surgery, medicine, IV fluid, and tube feedings  · Durable power of  for healthcare [facility]): This is a written record that states who you want to make healthcare choices for you when you are unable to make them for yourself  This person, called a proxy, is usually a family member or a friend  You may choose more than 1 proxy  · Do not resuscitate (DNR) order:  A DNR order is used in case your heart stops beating or you stop breathing  It is a request not to have certain forms of treatment, such as CPR  A DNR order may be included in other types of advance directives  · Medical directive: This covers the care that you want if you are in a coma, near death, or unable to make decisions for yourself  You can list the treatments you want for each condition  Treatment may include pain medicine, surgery, blood transfusions, dialysis, IV or tube feedings, and a ventilator (breathing machine)  · Values history: This document has questions about your views, beliefs, and how you feel and think about life  This information can help others choose the care that you would choose  Why are advance directives important? An advance directive helps you control your care  Although spoken wishes may be used, it is better to have your wishes written down  Spoken wishes can be misunderstood, or not followed  Treatments may be given even if you do not want them  An advance directive may make it easier for your family to make difficult choices about your care  Fall Prevention    Fall prevention  includes ways to make your home and other areas safer  It also includes ways you can move more carefully to prevent a fall  Health conditions that cause changes in your blood pressure, vision, or muscle strength and coordination may increase your risk for falls  Medicines may also increase your risk for falls if they make you dizzy, weak, or sleepy  Fall prevention tips:   · Stand or sit up slowly  · Use assistive devices as directed      · Wear shoes that fit well and have soles that   · Wear a personal alarm  · Stay active  · Manage your medical conditions  Home Safety Tips:  · Add items to prevent falls in the bathroom  · Keep paths clear  · Install bright lights in your home  · Keep items you use often on shelves within reach  · Paint or place reflective tape on the edges of your stairs  Weight Management   Why it is important to manage your weight:  Being overweight increases your risk of health conditions such as heart disease, high blood pressure, type 2 diabetes, and certain types of cancer  It can also increase your risk for osteoarthritis, sleep apnea, and other respiratory problems  Aim for a slow, steady weight loss  Even a small amount of weight loss can lower your risk of health problems  How to lose weight safely:  A safe and healthy way to lose weight is to eat fewer calories and get regular exercise  You can lose up about 1 pound a week by decreasing the number of calories you eat by 500 calories each day  Healthy meal plan for weight management:  A healthy meal plan includes a variety of foods, contains fewer calories, and helps you stay healthy  A healthy meal plan includes the following:  · Eat whole-grain foods more often  A healthy meal plan should contain fiber  Fiber is the part of grains, fruits, and vegetables that is not broken down by your body  Whole-grain foods are healthy and provide extra fiber in your diet  Some examples of whole-grain foods are whole-wheat breads and pastas, oatmeal, brown rice, and bulgur  · Eat a variety of vegetables every day  Include dark, leafy greens such as spinach, kale, mark greens, and mustard greens  Eat yellow and orange vegetables such as carrots, sweet potatoes, and winter squash  · Eat a variety of fruits every day  Choose fresh or canned fruit (canned in its own juice or light syrup) instead of juice  Fruit juice has very little or no fiber  · Eat low-fat dairy foods    Drink fat-free (skim) milk or 1% milk  Eat fat-free yogurt and low-fat cottage cheese  Try low-fat cheeses such as mozzarella and other reduced-fat cheeses  · Choose meat and other protein foods that are low in fat  Choose beans or other legumes such as split peas or lentils  Choose fish, skinless poultry (chicken or turkey), or lean cuts of red meat (beef or pork)  Before you cook meat or poultry, cut off any visible fat  · Use less fat and oil  Try baking foods instead of frying them  Add less fat, such as margarine, sour cream, regular salad dressing and mayonnaise to foods  Eat fewer high-fat foods  Some examples of high-fat foods include french fries, doughnuts, ice cream, and cakes  · Eat fewer sweets  Limit foods and drinks that are high in sugar  This includes candy, cookies, regular soda, and sweetened drinks  Exercise:  Exercise at least 30 minutes per day on most days of the week  Some examples of exercise include walking, biking, dancing, and swimming  You can also fit in more physical activity by taking the stairs instead of the elevator or parking farther away from stores  Ask your healthcare provider about the best exercise plan for you  © Copyright [address] 2018 Information is for End User's use only and may not be sold, redistributed or otherwise used for commercial purposes  All illustrations and images included in CareNotes® are the copyrighted property of A D A M , Inc  or [employer] [facility] Prevention   AMBULATORY CARE:   Fall prevention  includes ways to make your home and other areas safer  It also includes ways you can move more carefully to prevent a fall  Health conditions that cause changes in your blood pressure, vision, or muscle strength and coordination may increase your risk for falls  Medicines may also increase your risk for falls if they make you dizzy, weak, or sleepy  Call 911 or have someone else call if:   · You have fallen and are unconscious      · You have fallen and cannot move part of your body  Contact your healthcare provider if:   · You have fallen and have pain or a headache  · You have questions or concerns about your condition or care  Fall prevention tips:   · Stand or sit up slowly  This may help you keep your balance and prevent falls  · Use assistive devices as directed  Your healthcare provider may suggest that you use a cane or walker to help you keep your balance  You may need to have grab bars put in your bathroom near the toilet or in the shower  · Wear shoes that fit well and have soles that   Wear shoes both inside and outside  Use slippers with good   Do not wear shoes with high heels  · Wear a personal alarm  This is a device that allows you to call 911 if you fall and need help  Ask your healthcare provider for more information  · Stay active  Exercise can help strengthen your muscles and improve your balance  Your healthcare provider may recommend water aerobics or walking  He or she may also recommend physical therapy to improve your coordination  Never start an exercise program without talking to your healthcare provider first          · Manage your medical conditions  Keep all appointments with your healthcare providers  Visit your eye doctor as directed  Home safety tips:       · Add items to prevent falls in the bathroom  Put nonslip strips on your bath or shower floor to prevent you from slipping  Use a bath mat if you do not have carpet in the bathroom  This will prevent you from falling when you step out of the bath or shower  Use a shower seat so you do not need to stand while you shower  Sit on the toilet or a chair in your bathroom to dry yourself and put on clothing  This will prevent you from losing your balance from drying or dressing yourself while you are standing  · Keep paths clear  Remove books, shoes, and other objects from walkways and stairs   Place cords for telephones and lamps out of the way so that you do not need to walk over them  Tape them down if you cannot move them  Remove small rugs  If you cannot remove a rug, secure it with double-sided tape  This will prevent you from tripping  · Install bright lights in your home  Use night lights to help light paths to the bathroom or kitchen  Always turn on the light before you start walking  · Keep items you use often on shelves within reach  Do not use a step stool to help you reach an item  · Paint or place reflective tape on the edges of your stairs  This will help you see the stairs better  Follow up with your doctor as directed:  Write down your questions so you remember to ask them during your visits  © Copyright [employer] 2022 Information is for End User's use only and may not be sold, redistributed or otherwise used for commercial purposes  All illustrations and images included in CareNotes® are the copyrighted property of A D A M , Inc  or [employer]   The above information is an  only  It is not intended as medical advice for individual conditions or treatments  Talk to your doctor, nurse or pharmacist before following any medical regimen to see if it is safe and effective for you  Physical Activity for Older Adults   WHAT YOU NEED TO KNOW:   What do I need to know about physical activity and aging? Physical activity can help you get or stay physically and mentally healthy as you age  You may be able to do your daily activities more easily  If you have arthritis, your joints may move more easily and with less pain  Your bones and muscles will get stronger  Strength and better balance help prevent osteoporosis and reduce your risk for falls  Regular activity can improve your mood and appetite, and help you sleep better  Your risk for diseases such as cancer, heart disease, diabetes, and stroke will be lower   Activity can help you control your blood pressure and blood sugar levels, and lower your cholesterol  Activity can also slow or prevent cognitive (thinking) problems as you age  What do I need to know about physical activity safety? Talk to your healthcare provider before you start doing physical activities  Your provider will check your general health  He or she may recommend you avoid certain activities based on your health  He or she will also make sure your physical activity plan works with any medicines you are taking  Some medicines can make you sleepy or cause balance problems  These can make certain physical activities less safe  Start slowly and work up to more activity  It is important not to become highly active too quickly  Your body will need time to adjust  For example, you can cause serious injury if you try to lift a weight that is too heavy  Your healthcare provider can help you create a plan  The plan may start with a few minutes of physical activity on certain days of the week  You can add activities slowly over time  This may include making sessions longer, or being active on more days of the week  You can also add more weight as you get stronger  Do a variety of activities throughout the week  Do conditioning, strength training, flexibility, and balance activities  General guidelines are included below for how much of each activity to get  Your healthcare provider or physical therapist may give you specific instructions to follow  Stretch before and after you do any conditioning or strengthening activities  You can also do stretching activities on days you do not do any other kind of activity  Move slowly and smoothly  Avoid fast or jerky motions to help prevent an injury  Do exercises and stretches on both sides  This helps the muscles on both sides remain strong and flexible  Stop if you feel sharp pain or an increase in pain  Stop the exercise and contact your healthcare provider if you have these symptoms   It is normal to feel some discomfort, such as a dull ache, during exercise  Regular exercise will help decrease your discomfort over time  Drink liquid before, during, and after activity  Liquid helps prevent dehydration during exercise  Dehydration can cause you to become dizzy or to fall  Liquids are especially important on hot days  What kinds of activities help improve flexibility? You can improve your flexibility by doing stretching activities  Only stretch far enough that you feel the stretch but do not feel pain  Hold the stretch for 30 to 60 seconds, or for as long as directed  Repeat the stretch 2 or 3 times before you move to the next body area  Breathe normally  Do not hold your breath  It is important to breathe in and out so you do not tense up during exercise  Tension could prevent your muscles from stretching  The following are examples of flexibility activities:  Crossover arm stretch:  Relax your shoulders  Hold your upper arm with the opposite hand  Pull your arm across your chest until you feel a stretch  Hold the stretch for 30 seconds  Return to the starting position  Back stretch:  Lie on your back with your hands behind your head  Bend your knees and turn the lower half of your body to one side  Hold this position for 10 seconds  Repeat 3 times on each side  Hip stretch:  Lie on the ground  Place both hands on the shin of 1 leg  Pull your knee toward your chest  Repeat on the other side  Standing quadriceps stretch:  Stand and place one hand against a wall or hold the back of a chair for balance  With your weight on one leg, bend your other leg and grab your ankle  Pull your heel toward your buttocks  Standing hamstring stretch:  Stand with your feet hips distance apart  Life 1 leg and rest it on a firm surface, such as a table or chair  Keep your toes pointing up  Slide your hands forward along the side of your leg until you feel a stretch   Keep your chest lifted and your back straight  Hold for 30 seconds  What kinds of activities help improve balance? Good balance can help you prevent falls  Do balance activities a few times each week  You might want to ask someone to help you while you do these activities  The person can help make sure you do not fall  Make sure your path or activity area is clear of objects before you begin  The following are examples of activities that help improve balance:  Balance on 1 foot:  Hold something sturdy, such as a wall, chair, or walker  Balance your weight on both feet first  Then slowly lift 1 foot off the ground  When you have your balance, you can try letting go of the sturdy object  Make sure it is available for you to hold again if needed  Try to stand on 1 foot for about 10 seconds  Then repeat on the other foot  Walking in a straight line:  Put your weight equally on both feet to start  Make sure you have your balance  Then walk forward slowly, putting 1 foot directly in front of the other  Walking backward: If no one is available to stand next to you, stand next to a wall  Lean against the wall, and keep your arm on the wall as you walk  Make sure you have your balance  Then walk backward  Go slowly  Make sure you have good balance before you take the next step back  Toni Chi:  Toni Chi combines slow, precise movements with deep breathing and meditation  Classes may be available  An instructor will show you how to do the moves  What kinds of activities help improve conditioning? Conditioning includes activities that increase your heart rate and breathing  Activity that uses body weight is called weightbearing exercise  Examples include walking, dancing, and raking leaves  Weightbearing activities help strengthen bones  Nonweightbearing activities include riding a bicycle and swimming  Aim for 150 to 300 minutes (2 5 to 5 hours) of moderate aerobic activity each week   The following are examples of activities that help improve conditioning:  Walking:  Walking is a good, low-impact exercise  If you are not able to go outdoors, you can still walk in your home  Make sure the walking path in your home is clear and has good lighting  You can also march in place  Chair exercises:  Chair exercises are a safe way to move if you have balance problems  Sit in a hard chair that has a back  Keep your feet on the ground when you work your arms  Hold the seat or arms of the chair when you work your legs  You can lift weights, use a resistance band, hold an object such as a soup can, or just move your arms and legs  Chair exercise group classes may also be available  An instructor shows you moves to do while you sit in the chair  Water aerobics:  Water creates resistance  This means it is harder to move in water than it is on dry ground  Water aerobics can help prevent falls while you exercise  You will raise your heart rate and breathing just by walking along the bottom of the pool through the water  You can also  place and work your upper body  Hold pool equipment such as a flotation device or pool noodle to add weight to your activity  Water aerobics group classes may also be available  An instructor shows you moves to do in the water  What kinds of activities help build strength? Strength training helps you keep the muscles you have and build new muscles  Strong muscles help protect your bones  You can also improve your balance by strengthening your leg, back, and core (abdomen) muscles  If you do not have wights or resistance bands, you can use household items, such as soup cans  You can stand or sit in a chair or wheelchair when you do strength training  Try to work all the major muscle groups, such as your legs, arms, abdomen, and chest  Do strength training at least 2 times each week  Spread the days out so you are not doing strength training 2 days in a row   You can cause injury if you do not rest muscles in between sessions  The following are examples of strength training exercises:  Weightlifting:  Start with a weight you can lift easily  You can work up to [employer]  Do not try to lift heavy weights right away  You might cause a muscle or tendon injury  Repeat each move until you cannot do it even 1 more time  That is 1 set  Do 2 or 3 sets on each area  Resistance training: The ends of a resistance band can be held in your hands  You can tie 1 end to a sturdy object  Repeat each move until you cannot do it even 1 more time  That is 1 set  Do 2 or 3 sets on each area  Other activities:  Work such as digging and shoveling can strengthen muscles  Exercises such as push-ups, sit-ups, or squats can also build muscles  What are some tips to help me stay on track? Start slowly and work up  You do not have to do all the activity at one time  You can do a few minutes at a time  Remember that some physical activity is better than none  Stand up during the day, even if you cannot walk around  Your body uses more energy when you stand  When you do conditioning activities, aim for a speed that is challenging but not too difficult  You should be able to speak a few words at a time but not be able to sing  Plan activities you enjoy  Do a variety of activities so you stay challenged  You do not have to do regular exercises to be active  Walk outdoors, go shopping, or visit a museum  The more you enjoy your activities, the easier it will be to continue doing them  Ask for support from the people in your life  Go for a walk after dinner with your family  Meet friends at the park  Find someone who likes the same classes you like  Make plans to attend class together  You may be more likely to go if you know another person is counting on you to be there  Get involved in community events, such as cleaning a community park  Ask someone to help you stay on track   For example, you can tell the person about your daily or weekly activity  What do I need to know about nutrition and activity? Healthy foods will give you the energy you need to be active  Activity and good nutrition work together to help you reach or maintain a healthy weight  Healthy foods include fruits, vegetables, lean meats, fish, cooked beans, whole-grain breads, and low-fat dairy products  Your healthcare provider can help you create a healthy meal plan  He or she can tell you how many calories you need to stay active and still lose weight if needed  When should I call my doctor or physical therapist?   You have pain with any physical activity  You have questions or concerns about physical activity or your health  CARE AGREEMENT:   You have the right to help plan your care  Learn about your health condition and how it may be treated  Discuss treatment options with your healthcare providers to decide what care you want to receive  You always have the right to refuse treatment  The above information is an  only  It is not intended as medical advice for individual conditions or treatments  Talk to your doctor, nurse or pharmacist before following any medical regimen to see if it is safe and effective for you  © Copyright [employer] 2022 Information is for End User's use only and may not be sold, redistributed or otherwise used for commercial purposes   All illustrations and images included in CareNotes® are the copyrighted property of [employer]  or [address] [place] [address]

## 2022-03-09 NOTE — PROGRESS NOTES
Assessment and Plan:     Problem List Items Addressed This Visit        Cardiovascular and Mediastinum    Peripheral vascular disease with claudication [facility])    Relevant Orders    Ambulatory Referral to Vascular Surgery    Essential hypertension       Other    Medicare annual wellness visit, subsequent - Primary    Hyperlipidemia    History of deep venous thrombosis (DVT) of distal vein of right lower extremity    BMI 28 0-28 9,adult           Preventive health issues were discussed with patient, and age appropriate screening tests were ordered as noted in patient's After Visit Summary  Personalized health advice and appropriate referrals for health education or preventive services given if needed, as noted in patient's After Visit Summary       History of Present Illness:     Patient presents for Medicare Annual Wellness visit    Patient Care Team:  Swapna Fortune DO as PCP - General (Family Medicine)     Problem List:     Patient Active Problem List   Diagnosis    Trochanteric bursitis of left hip    Essential hypertension    Hyperlipidemia    History of deep venous thrombosis (DVT) of distal vein of right lower extremity    Hyperglycemia    Tremor of both hands    BMI 28 0-28 9,adult    Medicare annual wellness visit, subsequent    Peripheral vascular disease with claudication ([address] )      Past Medical and Surgical History:     Past Medical History:   Diagnosis Date    Hx of blood clots     Hyperlipidemia     Hypertension      Past Surgical History:   Procedure Laterality Date    HERNIA REPAIR      KNEE ARTHROSCOPY Right 07/31/2015    PROSTATE SURGERY        Family History:     Family History   Problem Relation Age of Onset    Lung cancer Mother     Ulcers Father     No Known Problems Sister     Heart attack Brother     Alcohol abuse Brother     Kidney disease Brother       Social History:     Social History     Socioeconomic History    Marital status: /Civil Union     Spouse name: None    Number of children: None    Years of education: None    Highest education level: None   Occupational History    None   Tobacco Use    Smoking status: Former Smoker     Quit date:      Years since quittin 2    Smokeless tobacco: Never Used    Tobacco comment: 2+ppd x 20 years    Vaping Use    Vaping Use: Never used   Substance and Sexual Activity    Alcohol use: Not Currently    Drug use: Not Currently    Sexual activity: Not Currently   Other Topics Concern    None   Social History Narrative    Retired, lives with his wife, boubacar and Maame [address] [place] [id number]     Social Determinants of Health     Financial Resource Strain: Not on file   Food Insecurity: Not on file   Transportation Needs: Not on file   Physical Activity: Not on file   Stress: Not on file   Social Connections: Not on file   Intimate Partner Violence: Not on file   Housing Stability: Not on file      Medications and Allergies:     Current Outpatient Medications   Medication Sig Dispense Refill    ascorbic Acid (VITAMIN C) 500 MG CPCR Take 500 mg by mouth daily      b complex vitamins capsule Take 1 capsule by mouth daily      Boswellia-Glucosamine-Vit D (OSTEO BI-FLEX ONE PER DAY PO) Take by mouth        cholecalciferol (VITAMIN D3) 400 units tablet Take 800 Units by mouth daily       diltiazem (DILACOR XR) 240 MG 24 hr capsule Take 1 capsule (240 mg total) by mouth daily 90 capsule 1    lisinopril-hydrochlorothiazide (PRINZIDE,ZESTORETIC) 20-25 MG per tablet Take 1 tablet by mouth daily      magnesium 30 MG tablet Take 30 mg by mouth daily      Omega-3 Fatty Acids (fish oil) 1,000 mg Take 2,000 mg by mouth daily      pravastatin (PRAVACHOL) 40 mg tablet TAKE ONE TABLET BY MOUTH AT BEDTIME INSTEAD OF FLUVASTATIN FOR CHOLESTEROL      aspirin 81 mg chewable tablet TAKE 81MG BY MOUTH EVERY DAY      Potassium 99 MG TABS Take 1 tablet by mouth daily (Patient not taking: Reported on 3/9/2022 )       No current facility-administered medications for this visit  No Known Allergies   Immunizations:     Immunization History   Administered Date(s) Administered    COVID-19 MODERNA VACC 0 5 ML IM 02/09/2021, 03/09/2021    H1N1, All Formulations 04/29/2010    INFLUENZA 10/16/2000, 10/24/2001, 10/24/2002, 01/01/2003, 01/13/2003, 11/17/2004, 11/01/2005, 11/08/2006, 10/29/2007, 10/04/2008, 09/15/2009, 10/02/2010, 10/03/2011, 10/17/2012, 11/13/2013, 09/30/2014, 09/24/2020    Influenza Quadrivalent Preservative Free 3 years and older IM 10/30/2018, 09/27/2019, 09/24/2020    Influenza, Seasonal Vaccine, Quadrivalent, Adjuvanted,  5e 09/23/2021    Influenza, seasonal, injectable, preservative free 09/22/2015, 03/07/2017, 10/03/2017    Pneumococcal 01/01/2004, 11/30/2004    Pneumococcal Conjugate 13-Valent 02/09/2016    Tdap 03/10/2014    Zoster 04/03/2013      Health Maintenance: There are no preventive care reminders to display for this patient  Topic Date Due    Pneumococcal Vaccine: 65+ Years (2 of 2 - PPSV23) 02/09/2017    COVID-19 Vaccine (3 - Booster for Moderna series) 08/09/2021    Influenza Vaccine (1) 09/01/2021      Medicare Health Risk Assessment:     /70 (BP Location: Left arm, Patient Position: Sitting, Cuff Size: Standard)   Pulse 74   Temp 98 4 °F (36 9 °C) (Tympanic)   Ht 5' 7" (1 702 m)   Wt 83 5 kg (184 lb)   SpO2 96%   BMI 28 82 kg/m²          Health Risk Assessment:   Patient rates overall health as good  Patient feels that their physical health rating is same  Patient is very satisfied with their life  Eyesight was rated as same  Hearing was rated as same  Patient feels that their emotional and mental health rating is same  Patients states they are never, rarely angry  Patient states they are sometimes unusually tired/fatigued  Pain experienced in the last 7 days has been none   Patient states that he has experienced no weight loss or gain in last 6 months  Depression Screening:   PHQ-2 Score: 0      Fall Risk Screening: In the past year, patient has experienced: history of falling in past year    Number of falls: 1  Injured during fall?: Yes    Feels unsteady when standing or walking?: Yes    Worried about falling?: No      Home Safety:  Patient does not have trouble with stairs inside or outside of their home  Patient has working smoke alarms and has working carbon monoxide detector  Home safety hazards include: none  Nutrition:   Current diet is Regular  Medications:   Patient is currently taking over-the-counter supplements  OTC medications include: see medication list  Patient is able to manage medications  Activities of Daily Living (ADLs)/Instrumental Activities of Daily Living (IADLs):   Walk and transfer into and out of bed and chair?: Yes  Dress and groom yourself?: Yes    Bathe or shower yourself?: Yes    Feed yourself?  Yes  Do your laundry/housekeeping?: Yes  Manage your money, pay your bills and track your expenses?: Yes  Make your own meals?: Yes    Do your own shopping?: Yes    Previous Hospitalizations:   Any hospitalizations or ED visits within the last 12 months?: No      Advance Care Planning:   Living will: Yes    Durable POA for healthcare: No    Advanced directive: Yes      Cognitive Screening:   Provider or family/friend/caregiver concerned regarding cognition?: No    PREVENTIVE SCREENINGS      Cardiovascular Screening:    General: Screening Not Indicated and History Lipid Disorder      Diabetes Screening:     General: Screening Current      Colorectal Cancer Screening:     General: Screening Not Indicated      Prostate Cancer Screening:    General: History Prostate Cancer and Screening Not Indicated      Osteoporosis Screening:    General: Screening Not Indicated      Abdominal Aortic Aneurysm (AAA) Screening:    Risk factors include: tobacco use        General: Screening Not Indicated      Lung Cancer Screening: General: Screening Not Indicated      Hepatitis C Screening:    General: Screening Not Indicated    Screening, Brief Intervention, and Referral to Treatment (SBIRT)    Screening  Typical number of drinks in a day: 0  Typical number of drinks in a week: 0  Interpretation: Low risk drinking behavior      Single Item Drug Screening:  How often have you used an illegal drug (including marijuana) or a prescription medication for non-medical reasons in the past year? never    Single Item Drug Screen Score: 0  Interpretation: Negative screen for possible drug use disorder      Eri Albarado, DO

## 2022-03-09 NOTE — PROGRESS NOTES
Assessment/Plan:         Diagnoses and all orders for this visit:    Medicare annual wellness visit, subsequent    Essential hypertension  Comments:  ideally controlled, cpm    Hyperlipidemia, unspecified hyperlipidemia type  Comments:  has had updated fasting labs at the [facility]- will obtain copy of results    Peripheral vascular disease with claudication ([facility] [address] )  Comments:  right>left LE; he was advised to resume daily 81mg ASA  Orders:  -     Ambulatory Referral to Vascular Surgery; Future    History of deep venous thrombosis (DVT) of distal vein of right lower extremity    BMI 28 0-28 9,adult          Subjective:   Chief Complaint   Patient presents with    Medicare Wellness Visit    Follow-up        Patient ID: Geri Ayala is a 78 y o  male  Here for AWV and f/u appt  No interval acute problems, doing well  Did receive his COVID vaccine booster at [facility] the one fall he had this year, he slipped on slow on sidewalk and fell against a concrete planter, got right back up "just hurt when I coughed for awhile"      The following portions of the patient's history were reviewed and updated as appropriate: allergies, current medications, past family history, past medical history, past social history, past surgical history and problem list     Review of Systems      Objective:      /70 (BP Location: Left arm, Patient Position: Sitting, Cuff Size: Standard)   Pulse 74   Temp 98 4 °F (36 9 °C) (Tympanic)   Ht 5' 7" (1 702 m)   Wt 83 5 kg (184 lb)   SpO2 96%   BMI 28 82 kg/m²          Physical Exam  Vitals and nursing note reviewed  Constitutional:       General: He is not in acute distress  Appearance: He is well-developed  He is not ill-appearing, toxic-appearing or diaphoretic  HENT:      Head: Normocephalic and atraumatic  Mouth/Throat:      Mouth: Mucous membranes are moist       Pharynx: Oropharynx is clear  Uvula midline     Eyes:      General: Lids are normal  Conjunctiva/sclera: Conjunctivae normal       Pupils: Pupils are equal, round, and reactive to light  Neck:      Thyroid: No thyroid mass or thyromegaly  Vascular: No JVD  Trachea: Trachea normal    Cardiovascular:      Rate and Rhythm: Normal rate and regular rhythm  Heart sounds: Normal heart sounds  Pulmonary:      Effort: Pulmonary effort is normal       Breath sounds: Normal breath sounds  Chest:   Breasts:      Right: No supraclavicular adenopathy  Left: No supraclavicular adenopathy  Abdominal:      General: Bowel sounds are normal  There is no distension or abdominal bruit  Palpations: Abdomen is soft  There is no hepatomegaly, splenomegaly or mass  Tenderness: There is no abdominal tenderness  Musculoskeletal:      Cervical back: Neck supple  Right lower leg: No edema  Left lower leg: No edema  Lymphadenopathy:      Cervical: No cervical adenopathy  Upper Body:      Right upper body: No supraclavicular adenopathy  Left upper body: No supraclavicular adenopathy  Skin:     General: Skin is warm and dry  Coloration: Skin is not pale  Neurological:      Mental Status: He is alert and oriented to person, place, and time  Gait: Gait normal       Deep Tendon Reflexes: Reflexes are normal and symmetric  Comments: MMSE nearly perfect score 30/31- recall was 2/3   Psychiatric:         Behavior: Behavior normal  Behavior is cooperative  BMI Counseling: Body mass index is 28 82 kg/m²  The BMI is above normal  Nutrition recommendations include decreasing overall calorie intake  Exercise recommendations include exercising 3-5 times per week  Falls Plan of Care: Balance, strength, and gait training instructions were provided

## 2022-04-18 ENCOUNTER — APPOINTMENT (OUTPATIENT)
Dept: RADIOLOGY | Facility: CLINIC | Age: 82
End: 2022-04-18
Payer: COMMERCIAL

## 2022-04-18 ENCOUNTER — APPOINTMENT (EMERGENCY)
Dept: CT IMAGING | Facility: HOSPITAL | Age: 82
DRG: 871 | End: 2022-04-18
Payer: COMMERCIAL

## 2022-04-18 ENCOUNTER — APPOINTMENT (EMERGENCY)
Dept: RADIOLOGY | Facility: HOSPITAL | Age: 82
DRG: 871 | End: 2022-04-18
Payer: COMMERCIAL

## 2022-04-18 ENCOUNTER — APPOINTMENT (INPATIENT)
Dept: ULTRASOUND IMAGING | Facility: HOSPITAL | Age: 82
DRG: 871 | End: 2022-04-18
Payer: COMMERCIAL

## 2022-04-18 ENCOUNTER — OFFICE VISIT (OUTPATIENT)
Dept: URGENT CARE | Facility: CLINIC | Age: 82
End: 2022-04-18
Payer: COMMERCIAL

## 2022-04-18 ENCOUNTER — HOSPITAL ENCOUNTER (INPATIENT)
Facility: HOSPITAL | Age: 82
LOS: 3 days | Discharge: HOME | DRG: 871 | End: 2022-04-21
Attending: EMERGENCY MEDICINE | Admitting: INTERNAL MEDICINE
Payer: COMMERCIAL

## 2022-04-18 VITALS
SYSTOLIC BLOOD PRESSURE: 139 MMHG | TEMPERATURE: 98 F | OXYGEN SATURATION: 91 % | DIASTOLIC BLOOD PRESSURE: 60 MMHG | RESPIRATION RATE: 24 BRPM | HEART RATE: 82 BPM

## 2022-04-18 DIAGNOSIS — R93.89 ABNORMAL CT SCAN: ICD-10-CM

## 2022-04-18 DIAGNOSIS — I26.99 PULMONARY EMBOLUS (HCC): Primary | ICD-10-CM

## 2022-04-18 DIAGNOSIS — R05.9 COUGH: ICD-10-CM

## 2022-04-18 DIAGNOSIS — J11.1 FLU: ICD-10-CM

## 2022-04-18 DIAGNOSIS — R50.9 FEVER, UNSPECIFIED FEVER CAUSE: ICD-10-CM

## 2022-04-18 DIAGNOSIS — R25.1 TREMOR OF BOTH HANDS: ICD-10-CM

## 2022-04-18 DIAGNOSIS — R50.9 FEVER, UNSPECIFIED FEVER CAUSE: Primary | ICD-10-CM

## 2022-04-18 PROBLEM — N17.9 AKI (ACUTE KIDNEY INJURY) (HCC): Status: ACTIVE | Noted: 2022-04-18

## 2022-04-18 PROBLEM — R65.10 SIRS (SYSTEMIC INFLAMMATORY RESPONSE SYNDROME) (HCC): Status: ACTIVE | Noted: 2022-04-18

## 2022-04-18 PROBLEM — J10.1 INFLUENZA A: Status: ACTIVE | Noted: 2022-04-18

## 2022-04-18 PROBLEM — A41.9 SEPSIS (HCC): Status: ACTIVE | Noted: 2022-04-18

## 2022-04-18 LAB
2HR DELTA HS TROPONIN: 3 NG/L
4HR DELTA HS TROPONIN: 8 NG/L
ALBUMIN SERPL BCP-MCNC: 3.8 G/DL (ref 3.5–5)
ALP SERPL-CCNC: 46 U/L (ref 34–104)
ALT SERPL W P-5'-P-CCNC: 17 U/L (ref 7–52)
ANION GAP SERPL CALCULATED.3IONS-SCNC: 6 MMOL/L (ref 4–13)
APTT PPP: 137 SECONDS (ref 23–37)
APTT PPP: 30 SECONDS (ref 23–37)
APTT PPP: 32 SECONDS (ref 23–37)
AST SERPL W P-5'-P-CCNC: 13 U/L (ref 13–39)
ATRIAL RATE: 81 BPM
ATRIAL RATE: 86 BPM
BACTERIA UR QL AUTO: NORMAL /HPF
BASOPHILS # BLD AUTO: 0.03 THOUSANDS/ΜL (ref 0–0.1)
BASOPHILS NFR BLD AUTO: 0 % (ref 0–1)
BILIRUB SERPL-MCNC: 0.63 MG/DL (ref 0.2–1)
BILIRUB UR QL STRIP: NEGATIVE
BNP SERPL-MCNC: 130 PG/ML (ref 1–100)
BUN SERPL-MCNC: 29 MG/DL (ref 5–25)
CALCIUM SERPL-MCNC: 9.3 MG/DL (ref 8.4–10.2)
CARDIAC TROPONIN I PNL SERPL HS: 24 NG/L
CARDIAC TROPONIN I PNL SERPL HS: 27 NG/L
CARDIAC TROPONIN I PNL SERPL HS: 28 NG/L
CARDIAC TROPONIN I PNL SERPL HS: 32 NG/L
CHLORIDE SERPL-SCNC: 99 MMOL/L (ref 96–108)
CLARITY UR: CLEAR
CO2 SERPL-SCNC: 31 MMOL/L (ref 21–32)
COLOR UR: YELLOW
CREAT SERPL-MCNC: 1.62 MG/DL (ref 0.6–1.3)
DEPRECATED AT III PPP: 82 % OF NORMAL (ref 92–136)
EOSINOPHIL # BLD AUTO: 0.05 THOUSAND/ΜL (ref 0–0.61)
EOSINOPHIL NFR BLD AUTO: 0 % (ref 0–6)
ERYTHROCYTE [DISTWIDTH] IN BLOOD BY AUTOMATED COUNT: 13 % (ref 11.6–15.1)
FLUAV RNA RESP QL NAA+PROBE: POSITIVE
FLUBV RNA RESP QL NAA+PROBE: NEGATIVE
GFR SERPL CREATININE-BSD FRML MDRD: 38 ML/MIN/1.73SQ M
GLUCOSE SERPL-MCNC: 126 MG/DL (ref 65–140)
GLUCOSE UR STRIP-MCNC: NEGATIVE MG/DL
HCT VFR BLD AUTO: 46.6 % (ref 36.5–49.3)
HGB BLD-MCNC: 15.3 G/DL (ref 12–17)
HGB UR QL STRIP.AUTO: NEGATIVE
IMM GRANULOCYTES # BLD AUTO: 0.07 THOUSAND/UL (ref 0–0.2)
IMM GRANULOCYTES NFR BLD AUTO: 1 % (ref 0–2)
INR PPP: 1.16 (ref 0.84–1.19)
INR PPP: 1.21 (ref 0.84–1.19)
KETONES UR STRIP-MCNC: NEGATIVE MG/DL
LACTATE SERPL-SCNC: 1.1 MMOL/L (ref 0.5–2)
LEUKOCYTE ESTERASE UR QL STRIP: NEGATIVE
LIPASE SERPL-CCNC: 23 U/L (ref 11–82)
LYMPHOCYTES # BLD AUTO: 1.35 THOUSANDS/ΜL (ref 0.6–4.47)
LYMPHOCYTES NFR BLD AUTO: 9 % (ref 14–44)
MCH RBC QN AUTO: 33.3 PG (ref 26.8–34.3)
MCHC RBC AUTO-ENTMCNC: 32.8 G/DL (ref 31.4–37.4)
MCV RBC AUTO: 101 FL (ref 82–98)
MONOCYTES # BLD AUTO: 1.67 THOUSAND/ΜL (ref 0.17–1.22)
MONOCYTES NFR BLD AUTO: 11 % (ref 4–12)
NEUTROPHILS # BLD AUTO: 11.9 THOUSANDS/ΜL (ref 1.85–7.62)
NEUTS SEG NFR BLD AUTO: 79 % (ref 43–75)
NITRITE UR QL STRIP: NEGATIVE
NON-SQ EPI CELLS URNS QL MICRO: NORMAL /HPF
NRBC BLD AUTO-RTO: 0 /100 WBCS
P AXIS: 80 DEGREES
P AXIS: 86 DEGREES
PH UR STRIP.AUTO: 5 [PH]
PLATELET # BLD AUTO: 183 THOUSANDS/UL (ref 149–390)
PMV BLD AUTO: 10 FL (ref 8.9–12.7)
POTASSIUM SERPL-SCNC: 3.7 MMOL/L (ref 3.5–5.3)
PR INTERVAL: 188 MS
PR INTERVAL: 200 MS
PROCALCITONIN SERPL-MCNC: 0.1 NG/ML
PROT SERPL-MCNC: 7.3 G/DL (ref 6.4–8.4)
PROT UR STRIP-MCNC: ABNORMAL MG/DL
PROTHROMBIN TIME: 14.7 SECONDS (ref 11.6–14.5)
PROTHROMBIN TIME: 15.2 SECONDS (ref 11.6–14.5)
QRS AXIS: 46 DEGREES
QRS AXIS: 56 DEGREES
QRSD INTERVAL: 94 MS
QRSD INTERVAL: 98 MS
QT INTERVAL: 362 MS
QT INTERVAL: 378 MS
QTC INTERVAL: 420 MS
QTC INTERVAL: 452 MS
RBC # BLD AUTO: 4.6 MILLION/UL (ref 3.88–5.62)
RBC #/AREA URNS AUTO: NORMAL /HPF
RSV RNA RESP QL NAA+PROBE: NEGATIVE
SARS-COV-2 RNA RESP QL NAA+PROBE: NEGATIVE
SODIUM SERPL-SCNC: 136 MMOL/L (ref 135–147)
SP GR UR STRIP.AUTO: 1.01 (ref 1–1.03)
T WAVE AXIS: 81 DEGREES
T WAVE AXIS: 83 DEGREES
TSH SERPL DL<=0.05 MIU/L-ACNC: 1.94 UIU/ML (ref 0.45–4.5)
UROBILINOGEN UR QL STRIP.AUTO: 0.2 E.U./DL
VENTRICULAR RATE: 81 BPM
VENTRICULAR RATE: 86 BPM
WBC # BLD AUTO: 15.07 THOUSAND/UL (ref 4.31–10.16)
WBC #/AREA URNS AUTO: NORMAL /HPF

## 2022-04-18 PROCEDURE — 81001 URINALYSIS AUTO W/SCOPE: CPT | Performed by: PHYSICIAN ASSISTANT

## 2022-04-18 PROCEDURE — 81240 F2 GENE: CPT | Performed by: PHYSICIAN ASSISTANT

## 2022-04-18 PROCEDURE — 83880 ASSAY OF NATRIURETIC PEPTIDE: CPT | Performed by: PHYSICIAN ASSISTANT

## 2022-04-18 PROCEDURE — 99223 1ST HOSP IP/OBS HIGH 75: CPT | Performed by: NURSE PRACTITIONER

## 2022-04-18 PROCEDURE — 93005 ELECTROCARDIOGRAM TRACING: CPT

## 2022-04-18 PROCEDURE — 80053 COMPREHEN METABOLIC PANEL: CPT | Performed by: PHYSICIAN ASSISTANT

## 2022-04-18 PROCEDURE — 84484 ASSAY OF TROPONIN QUANT: CPT | Performed by: NURSE PRACTITIONER

## 2022-04-18 PROCEDURE — 71045 X-RAY EXAM CHEST 1 VIEW: CPT

## 2022-04-18 PROCEDURE — 96365 THER/PROPH/DIAG IV INF INIT: CPT

## 2022-04-18 PROCEDURE — 84145 PROCALCITONIN (PCT): CPT | Performed by: PHYSICIAN ASSISTANT

## 2022-04-18 PROCEDURE — 81003 URINALYSIS AUTO W/O SCOPE: CPT | Performed by: PHYSICIAN ASSISTANT

## 2022-04-18 PROCEDURE — 87040 BLOOD CULTURE FOR BACTERIA: CPT | Performed by: PHYSICIAN ASSISTANT

## 2022-04-18 PROCEDURE — 85730 THROMBOPLASTIN TIME PARTIAL: CPT | Performed by: NURSE PRACTITIONER

## 2022-04-18 PROCEDURE — 71046 X-RAY EXAM CHEST 2 VIEWS: CPT

## 2022-04-18 PROCEDURE — 85670 THROMBIN TIME PLASMA: CPT | Performed by: PHYSICIAN ASSISTANT

## 2022-04-18 PROCEDURE — 74177 CT ABD & PELVIS W/CONTRAST: CPT

## 2022-04-18 PROCEDURE — 83605 ASSAY OF LACTIC ACID: CPT | Performed by: PHYSICIAN ASSISTANT

## 2022-04-18 PROCEDURE — 0241U HB NFCT DS VIR RESP RNA 4 TRGT: CPT | Performed by: PHYSICIAN ASSISTANT

## 2022-04-18 PROCEDURE — 85300 ANTITHROMBIN III ACTIVITY: CPT | Performed by: PHYSICIAN ASSISTANT

## 2022-04-18 PROCEDURE — 87449 NOS EACH ORGANISM AG IA: CPT | Performed by: NURSE PRACTITIONER

## 2022-04-18 PROCEDURE — 76770 US EXAM ABDO BACK WALL COMP: CPT

## 2022-04-18 PROCEDURE — 99285 EMERGENCY DEPT VISIT HI MDM: CPT | Performed by: PHYSICIAN ASSISTANT

## 2022-04-18 PROCEDURE — 86147 CARDIOLIPIN ANTIBODY EA IG: CPT | Performed by: PHYSICIAN ASSISTANT

## 2022-04-18 PROCEDURE — 86146 BETA-2 GLYCOPROTEIN ANTIBODY: CPT | Performed by: PHYSICIAN ASSISTANT

## 2022-04-18 PROCEDURE — 99285 EMERGENCY DEPT VISIT HI MDM: CPT

## 2022-04-18 PROCEDURE — 85306 CLOT INHIBIT PROT S FREE: CPT | Performed by: PHYSICIAN ASSISTANT

## 2022-04-18 PROCEDURE — 85613 RUSSELL VIPER VENOM DILUTED: CPT | Performed by: PHYSICIAN ASSISTANT

## 2022-04-18 PROCEDURE — 85610 PROTHROMBIN TIME: CPT | Performed by: PHYSICIAN ASSISTANT

## 2022-04-18 PROCEDURE — 84443 ASSAY THYROID STIM HORMONE: CPT | Performed by: NURSE PRACTITIONER

## 2022-04-18 PROCEDURE — 85025 COMPLETE CBC W/AUTO DIFF WBC: CPT | Performed by: PHYSICIAN ASSISTANT

## 2022-04-18 PROCEDURE — 85730 THROMBOPLASTIN TIME PARTIAL: CPT | Performed by: PHYSICIAN ASSISTANT

## 2022-04-18 PROCEDURE — 85732 THROMBOPLASTIN TIME PARTIAL: CPT | Performed by: PHYSICIAN ASSISTANT

## 2022-04-18 PROCEDURE — 71260 CT THORAX DX C+: CPT

## 2022-04-18 PROCEDURE — 85303 CLOT INHIBIT PROT C ACTIVITY: CPT | Performed by: PHYSICIAN ASSISTANT

## 2022-04-18 PROCEDURE — 85305 CLOT INHIBIT PROT S TOTAL: CPT | Performed by: PHYSICIAN ASSISTANT

## 2022-04-18 PROCEDURE — 93010 ELECTROCARDIOGRAM REPORT: CPT | Performed by: INTERNAL MEDICINE

## 2022-04-18 PROCEDURE — 84484 ASSAY OF TROPONIN QUANT: CPT | Performed by: PHYSICIAN ASSISTANT

## 2022-04-18 PROCEDURE — 36415 COLL VENOUS BLD VENIPUNCTURE: CPT | Performed by: PHYSICIAN ASSISTANT

## 2022-04-18 PROCEDURE — 94760 N-INVAS EAR/PLS OXIMETRY 1: CPT

## 2022-04-18 PROCEDURE — 99213 OFFICE O/P EST LOW 20 MIN: CPT

## 2022-04-18 PROCEDURE — 81241 F5 GENE: CPT | Performed by: PHYSICIAN ASSISTANT

## 2022-04-18 PROCEDURE — 83690 ASSAY OF LIPASE: CPT | Performed by: PHYSICIAN ASSISTANT

## 2022-04-18 PROCEDURE — 85705 THROMBOPLASTIN INHIBITION: CPT | Performed by: PHYSICIAN ASSISTANT

## 2022-04-18 RX ORDER — HEPARIN SODIUM 1000 [USP'U]/ML
6400 INJECTION, SOLUTION INTRAVENOUS; SUBCUTANEOUS
Status: DISCONTINUED | OUTPATIENT
Start: 2022-04-18 | End: 2022-04-20

## 2022-04-18 RX ORDER — CEFEPIME HYDROCHLORIDE 1 G/50ML
1000 INJECTION, SOLUTION INTRAVENOUS EVERY 12 HOURS
Status: DISCONTINUED | OUTPATIENT
Start: 2022-04-18 | End: 2022-04-19

## 2022-04-18 RX ORDER — CEFEPIME HYDROCHLORIDE 1 G/1
2000 INJECTION, POWDER, FOR SOLUTION INTRAMUSCULAR; INTRAVENOUS EVERY 12 HOURS
Status: DISCONTINUED | OUTPATIENT
Start: 2022-04-18 | End: 2022-04-18

## 2022-04-18 RX ORDER — CHLORAL HYDRATE 500 MG
2000 CAPSULE ORAL DAILY
Status: DISCONTINUED | OUTPATIENT
Start: 2022-04-18 | End: 2022-04-21 | Stop reason: HOSPADM

## 2022-04-18 RX ORDER — OMEGA-3S/DHA/EPA/FISH OIL/D3 300MG-1000
800 CAPSULE ORAL DAILY
Status: DISCONTINUED | OUTPATIENT
Start: 2022-04-18 | End: 2022-04-21 | Stop reason: HOSPADM

## 2022-04-18 RX ORDER — SODIUM CHLORIDE, SODIUM GLUCONATE, SODIUM ACETATE, POTASSIUM CHLORIDE, MAGNESIUM CHLORIDE, SODIUM PHOSPHATE, DIBASIC, AND POTASSIUM PHOSPHATE .53; .5; .37; .037; .03; .012; .00082 G/100ML; G/100ML; G/100ML; G/100ML; G/100ML; G/100ML; G/100ML
75 INJECTION, SOLUTION INTRAVENOUS ONCE
Status: COMPLETED | OUTPATIENT
Start: 2022-04-18 | End: 2022-04-18

## 2022-04-18 RX ORDER — CEFEPIME HYDROCHLORIDE 2 G/50ML
2000 INJECTION, SOLUTION INTRAVENOUS ONCE
Status: COMPLETED | OUTPATIENT
Start: 2022-04-18 | End: 2022-04-18

## 2022-04-18 RX ORDER — HEPARIN SODIUM 1000 [USP'U]/ML
6400 INJECTION, SOLUTION INTRAVENOUS; SUBCUTANEOUS ONCE
Status: COMPLETED | OUTPATIENT
Start: 2022-04-18 | End: 2022-04-18

## 2022-04-18 RX ORDER — PRAVASTATIN SODIUM 20 MG
20 TABLET ORAL
Status: DISCONTINUED | OUTPATIENT
Start: 2022-04-18 | End: 2022-04-21 | Stop reason: HOSPADM

## 2022-04-18 RX ORDER — HYDRALAZINE HYDROCHLORIDE 20 MG/ML
10 INJECTION INTRAMUSCULAR; INTRAVENOUS EVERY 6 HOURS PRN
Status: DISCONTINUED | OUTPATIENT
Start: 2022-04-18 | End: 2022-04-21 | Stop reason: HOSPADM

## 2022-04-18 RX ORDER — DILTIAZEM HYDROCHLORIDE 240 MG/1
240 CAPSULE, COATED, EXTENDED RELEASE ORAL DAILY
Status: DISCONTINUED | OUTPATIENT
Start: 2022-04-18 | End: 2022-04-21 | Stop reason: HOSPADM

## 2022-04-18 RX ORDER — ASCORBIC ACID 500 MG
500 TABLET ORAL DAILY
Status: DISCONTINUED | OUTPATIENT
Start: 2022-04-18 | End: 2022-04-21 | Stop reason: HOSPADM

## 2022-04-18 RX ORDER — HEPARIN SODIUM 10000 [USP'U]/100ML
3-30 INJECTION, SOLUTION INTRAVENOUS
Status: DISCONTINUED | OUTPATIENT
Start: 2022-04-18 | End: 2022-04-20

## 2022-04-18 RX ORDER — OSELTAMIVIR PHOSPHATE 30 MG/1
30 CAPSULE ORAL EVERY 12 HOURS SCHEDULED
Status: DISCONTINUED | OUTPATIENT
Start: 2022-04-18 | End: 2022-04-21 | Stop reason: HOSPADM

## 2022-04-18 RX ORDER — ACETAMINOPHEN 325 MG/1
650 TABLET ORAL EVERY 6 HOURS PRN
Status: DISCONTINUED | OUTPATIENT
Start: 2022-04-18 | End: 2022-04-21 | Stop reason: HOSPADM

## 2022-04-18 RX ORDER — ALBUTEROL SULFATE 2.5 MG/3ML
2.5 SOLUTION RESPIRATORY (INHALATION) EVERY 4 HOURS PRN
Status: DISCONTINUED | OUTPATIENT
Start: 2022-04-18 | End: 2022-04-21 | Stop reason: HOSPADM

## 2022-04-18 RX ORDER — HEPARIN SODIUM 1000 [USP'U]/ML
3200 INJECTION, SOLUTION INTRAVENOUS; SUBCUTANEOUS
Status: DISCONTINUED | OUTPATIENT
Start: 2022-04-18 | End: 2022-04-20

## 2022-04-18 RX ADMIN — HEPARIN SODIUM 18 UNITS/KG/HR: 10000 INJECTION, SOLUTION INTRAVENOUS at 13:24

## 2022-04-18 RX ADMIN — OMEGA-3 FATTY ACIDS CAP 1000 MG 2000 MG: 1000 CAP at 15:11

## 2022-04-18 RX ADMIN — DILTIAZEM HYDROCHLORIDE 240 MG: 240 CAPSULE, EXTENDED RELEASE ORAL at 15:11

## 2022-04-18 RX ADMIN — HYDRALAZINE HYDROCHLORIDE 10 MG: 20 INJECTION INTRAMUSCULAR; INTRAVENOUS at 15:30

## 2022-04-18 RX ADMIN — SODIUM CHLORIDE 1000 ML: 0.9 INJECTION, SOLUTION INTRAVENOUS at 10:27

## 2022-04-18 RX ADMIN — OXYCODONE HYDROCHLORIDE AND ACETAMINOPHEN 500 MG: 500 TABLET ORAL at 15:10

## 2022-04-18 RX ADMIN — CHOLECALCIFEROL TAB 10 MCG (400 UNIT) 800 UNITS: 10 TAB at 15:10

## 2022-04-18 RX ADMIN — PRAVASTATIN SODIUM 20 MG: 20 TABLET ORAL at 15:36

## 2022-04-18 RX ADMIN — CEFEPIME HYDROCHLORIDE 2000 MG: 2 INJECTION, SOLUTION INTRAVENOUS at 10:49

## 2022-04-18 RX ADMIN — OSELTAMIVIR PHOSPHATE 30 MG: 30 CAPSULE ORAL at 21:37

## 2022-04-18 RX ADMIN — CEFEPIME HYDROCHLORIDE 1000 MG: 1 INJECTION, SOLUTION INTRAVENOUS at 21:41

## 2022-04-18 RX ADMIN — HEPARIN SODIUM 6400 UNITS: 1000 INJECTION, SOLUTION INTRAVENOUS; SUBCUTANEOUS at 13:24

## 2022-04-18 RX ADMIN — SODIUM CHLORIDE, SODIUM GLUCONATE, SODIUM ACETATE, POTASSIUM CHLORIDE, MAGNESIUM CHLORIDE, SODIUM PHOSPHATE, DIBASIC, AND POTASSIUM PHOSPHATE 75 ML/HR: .53; .5; .37; .037; .03; .012; .00082 INJECTION, SOLUTION INTRAVENOUS at 15:35

## 2022-04-18 RX ADMIN — IOHEXOL 100 ML: 350 INJECTION, SOLUTION INTRAVENOUS at 11:24

## 2022-04-18 RX ADMIN — AZITHROMYCIN MONOHYDRATE 500 MG: 500 INJECTION, POWDER, LYOPHILIZED, FOR SOLUTION INTRAVENOUS at 15:45

## 2022-04-18 NOTE — NURSING NOTE
Patient received from ED on Heparin gtt at 18 u/kg/hr; vital signs stable; hypertensive; A+O x4; cooperative; Lungs diminished with scattered crackles and expiratory wheezes audible on assessment; O2 via nasal cannula at 4 liters; SAO2 92-95%   Isolation maintained for Contact and Airborne for Flu A

## 2022-04-18 NOTE — QUICK NOTE
Progress Note - Triage Assessment   Tania Jaime 78 y o  male MRN: [id number]    Time Called: 12:15  Date Called: 04/18/22  Room#: ED 24  Time Evaluated: 12:30  Person requesting evaluation: Dr Eunice Dunlap to ED to evaluate patient for possible admission to Critical Care Service, chart reviewed and patient evaluated  Case discussed with Critical Care attending Dr Elvira Hall and after review it was felt the patient is appropriate for admission to a general medical floor  Recommendations discussed with ED attending Dr Toyin Peoples          Triage Assessment:   Patient found to be positive for subsegmental PEs with evidence of right heart strain   and initial troponin was negative  On evaluation patient was found to not be in any respiratory distress and was tolerating 2 L nasal canula O2  His vital signs are stable and he is hemodynamically stable  Of note patient also tested positive for influenza A  Patient does not have any critical care needs is appropriate for admission to the medical floor at this time and  I recommend this patient have his troponin trended, Echocardiogram performed and he be placed on systemic AC to treat subsegmental PE  Patient appropriate to be admitted to med-surg level of care      If any questions or concerns please call the critical care team at ext [phone]

## 2022-04-18 NOTE — ASSESSMENT & PLAN NOTE
· POA  Presented with creatinine of 1 62  No baseline creatinine; however, previous level- 1 24 mg/dL  · Patient's preadmission lisinopril and HCTZ on hold in the setting of PATRICK  · Renal ultrasound: Small echogenic left kidney nodule  May be a complicated cyst   If the patient is able, dedicated CT renal protocol or MR is recommended  Otherwise short interval follow-up can be performed    · Check PVR  · Continue gentle IV fluid  · Continue to monitor renal function, avoid hypotension and nephrotoxins

## 2022-04-18 NOTE — PROGRESS NOTES
[facility]        NAME: Jazmine Engle is a 78 y o  male  : 1940    MRN: [id number]  DATE: 2022  TIME: 9:10 AM    Assessment and Plan   Fever, unspecified fever cause [R50 9]  1  Fever, unspecified fever cause  XR chest pa & lateral    Transfer to other facility   2  Cough  XR chest pa & lateral    Transfer to other facility       Recommend transfer to ER for further evaluation  Elderly patient with cough, fevers, weakness, decreased PO intake, and decreased 02 sat's  Concern for pneumonia  Patient's son will transport via private vehicle  02 sat 91% on room air prior to transfer  Patient Instructions   Proceed to [facility] ER for further evaluation  Follow up with PCP in 3-5 days  Proceed to  ER if symptoms worsen  Chief Complaint     Chief Complaint   Patient presents with    difficulty breathing     weak, fever 104, sick since monday, doesn't wear oxygen at home, family was sick         History of Present Illness       HPI   Jazmine Egnle is a 78 y o  male who presents today for evaluation of fevers, cough, and SOB  His symptoms started 1 week ago with a cough, and yesterday developed a fever up to 104  He has also been having generalized weakness, and decreased appetite and PO intake  He denies SOB, chest pain, N/V/D  His family was sick with similar symptoms last week and treated for pneumonia  He has been taking OTC Coricidin and Tylenol for his symptoms  He is UTD on his Flu and Covid vaccine  Review of Systems   Review of Systems   Constitutional: Positive for appetite change, fatigue and fever  Negative for chills  HENT: Negative for congestion, ear pain, postnasal drip, rhinorrhea and sore throat  Respiratory: Positive for cough and shortness of breath  Negative for wheezing  Cardiovascular: Negative for chest pain and palpitations  Gastrointestinal: Negative for abdominal pain, diarrhea, nausea and vomiting     Genitourinary: Negative for decreased urine volume and dysuria  Musculoskeletal: Negative for arthralgias and myalgias  Skin: Negative for color change and rash  Neurological: Positive for weakness (generalized)  Negative for dizziness           Current Medications       Current Outpatient Medications:     ascorbic Acid (VITAMIN C) 500 MG CPCR, Take 500 mg by mouth daily, Disp: , Rfl:     aspirin 81 mg chewable tablet, TAKE 81MG BY MOUTH EVERY DAY, Disp: , Rfl:     b complex vitamins capsule, Take 1 capsule by mouth daily, Disp: , Rfl:     Boswellia-Glucosamine-Vit D (OSTEO BI-FLEX ONE PER DAY PO), Take by mouth  , Disp: , Rfl:     cholecalciferol (VITAMIN D3) 400 units tablet, Take 800 Units by mouth daily , Disp: , Rfl:     diltiazem (DILACOR XR) 240 MG 24 hr capsule, Take 1 capsule (240 mg total) by mouth daily, Disp: 90 capsule, Rfl: 1    lisinopril-hydrochlorothiazide (PRINZIDE,ZESTORETIC) 20-25 MG per tablet, Take 1 tablet by mouth daily, Disp: , Rfl:     magnesium 30 MG tablet, Take 30 mg by mouth daily, Disp: , Rfl:     Omega-3 Fatty Acids (fish oil) 1,000 mg, Take 2,000 mg by mouth daily, Disp: , Rfl:     Potassium 99 MG TABS, Take 1 tablet by mouth daily (Patient not taking: Reported on 3/9/2022 ), Disp: , Rfl:     pravastatin (PRAVACHOL) 40 mg tablet, TAKE ONE TABLET BY MOUTH AT BEDTIME INSTEAD OF FLUVASTATIN FOR CHOLESTEROL, Disp: , Rfl:     Current Allergies     Allergies as of 04/18/2022    (No Known Allergies)            The following portions of the patient's history were reviewed and updated as appropriate: allergies, current medications, past family history, past medical history, past social history, past surgical history and problem list      Past Medical History:   Diagnosis Date    Hx of blood clots     Hyperlipidemia     Hypertension        Past Surgical History:   Procedure Laterality Date    HERNIA REPAIR      KNEE ARTHROSCOPY Right 07/31/2015    PROSTATE SURGERY         Family History   Problem Relation Age of Onset    Lung cancer Mother     Ulcers Father     No Known Problems Sister     Heart attack Brother     Alcohol abuse Brother     Kidney disease Brother          Medications have been verified  Objective   /60   Pulse 82   Temp 98 °F (36 7 °C)   Resp (!) 24   SpO2 91%        Physical Exam     Physical Exam  Vitals and nursing note reviewed  Constitutional:       General: He is not in acute distress  Appearance: Normal appearance  Comments: Pt sitting on stretcher in NAD  His 02 sat on monitor fluctuates between 90-95%  HENT:      Head: Normocephalic and atraumatic  Mouth/Throat:      Mouth: Mucous membranes are moist       Pharynx: Uvula midline  No posterior oropharyngeal erythema  Tonsils: No tonsillar exudate  Cardiovascular:      Rate and Rhythm: Normal rate and regular rhythm  Heart sounds: Normal heart sounds, S1 normal and S2 normal    Pulmonary:      Effort: Pulmonary effort is normal       Breath sounds: Examination of the right-lower field reveals rales  Rales present  No wheezing or rhonchi  Musculoskeletal:      Right lower leg: No edema  Left lower leg: No edema  Lymphadenopathy:      Cervical: No cervical adenopathy  Skin:     General: Skin is warm and dry  Capillary Refill: Capillary refill takes less than 2 seconds  Neurological:      General: No focal deficit present  Mental Status: He is alert  Psychiatric:         Behavior: Behavior normal  Behavior is cooperative

## 2022-04-18 NOTE — ASSESSMENT & PLAN NOTE
· With acute respiratory failure hypoxia; SpO2 of 88% on room air  · Started Tamiflu 30 mg b i d  X5 days  · Supportive care  · Respiratory protocol  · Titrated off of oxygen as tolerated

## 2022-04-18 NOTE — PLAN OF CARE
Problem: RESPIRATORY - ADULT  Goal: Achieves optimal ventilation and oxygenation  Description: INTERVENTIONS:  - Assess for changes in respiratory status  - Assess for changes in mentation and behavior  - Position to facilitate oxygenation and minimize respiratory effort  - Oxygen administered by appropriate delivery if ordered  - Initiate smoking cessation education as indicated  - Encourage broncho-pulmonary hygiene including cough, deep breathe, Incentive Spirometry  - Assess the need for suctioning and aspirate as needed  - Assess and instruct to report SOB or any respiratory difficulty  - Respiratory Therapy support as indicated  Outcome: Progressing     Problem: SKIN/TISSUE INTEGRITY - ADULT  Goal: Skin Integrity remains intact(Skin Breakdown Prevention)  Description: Assess:  -Perform Flo assessment every   -Clean and moisturize skin every   -Inspect skin when repositioning, toileting, and assisting with ADLS  -Assess under medical devices such as  every   -Assess extremities for adequate circulation and sensation     Bed Management:  -Have minimal linens on bed & keep smooth, unwrinkled  -Change linens as needed when moist or perspiring  -Avoid sitting or lying in one position for more than  hours while in bed  -Keep HOB at degrees     Toileting:  -Offer bedside commode  -Assess for incontinence every   -Use incontinent care products after each incontinent episode such as     Activity:  -Mobilize patient  times a day  -Encourage activity and walks on unit  -Encourage or provide ROM exercises   -Turn and reposition patient every  Hours  -Use appropriate equipment to lift or move patient in bed  -Instruct/ Assist with weight shifting every  when out of bed in chair  -Consider limitation of chair time  hour intervals    Skin Care:  -Avoid use of baby powder, tape, friction and shearing, hot water or constrictive clothing  -Relieve pressure over bony prominences using   -Do not massage red bony areas    Next Steps:  -Teach patient strategies to minimize risks such as    -Consider consults to  interdisciplinary teams such as   Outcome: Progressing     Problem: PAIN - ADULT  Goal: Verbalizes/displays adequate comfort level or baseline comfort level  Description: Interventions:  - Encourage patient to monitor pain and request assistance  - Assess pain using appropriate pain scale  - Administer analgesics based on type and severity of pain and evaluate response  - Implement non-pharmacological measures as appropriate and evaluate response  - Consider cultural and social influences on pain and pain management  - Notify physician/advanced practitioner if interventions unsuccessful or patient reports new pain  Outcome: Progressing

## 2022-04-18 NOTE — ASSESSMENT & PLAN NOTE
· No baseline creatinine however previous level- 1 24 mg/dL   · Presented with creatinine 1 62 mg/dL  · Will hold lisinopril and hydrochlorothiazide  · Obtain renal ultrasound  · Check PVR  · Monitor renal function closely, avoid hypotension and nephrotoxins  · Will give gentle IV fluid

## 2022-04-18 NOTE — ASSESSMENT & PLAN NOTE
· With history of right lower extremity DVT  · This is unprovoked  Concerning for possible malignancy underlying as CT chest, abdomen and pelvis with contrast shows fractures of the left 10th and 11th ribs suggesting possible pathologic fracture     · Consult hematology/oncology  · Started on heparin drip in the ED; will continue  · Obtain echocardiogram  · Continue supportive care  · Likely transition to Eliquis or apixaban pending Hematology evaluation

## 2022-04-18 NOTE — ASSESSMENT & PLAN NOTE
· Blood pressure noted to be elevated  · Will hold off on lisinopril and hydrochlorothiazide at this time due to PATRICK  · Continue with diltiazem  · May use p r n   Hydralazine to help with blood pressure control

## 2022-04-18 NOTE — ED PROVIDER NOTES
History  Chief Complaint   Patient presents with    Cough    Shortness of Breath     with exertion    Weakness - Generalized     77-year-old male presents to the emergency department seeking evaluation for cough shortness of breath generalized weakness and fatigue  Is reported the patient has had some sick exposures to family who were recently diagnosed with pneumonia  Patient is accompanied by his son who provides some of the medical history  Patient is not normally on oxygen  His baseline O2 sat today on room air was 88%  Son reports the patient has been fatigued with even minimal exertion and has been very short of breath at home  Reported fevers yesterday as well  Allergies reviewed          Prior to Admission Medications   Prescriptions Last Dose Informant Patient Reported? Taking?    Boswellia-Glucosamine-Vit D (OSTEO BI-FLEX ONE PER DAY PO)  Self Yes No   Sig: Take by mouth     Omega-3 Fatty Acids (fish oil) 1,000 mg  Self Yes No   Sig: Take 2,000 mg by mouth daily   Potassium 99 MG TABS  Self Yes No   Sig: Take 1 tablet by mouth daily   Patient not taking: Reported on 3/9/2022    ascorbic Acid (VITAMIN C) 500 MG CPCR  Self Yes No   Sig: Take 500 mg by mouth daily   aspirin 81 mg chewable tablet  Self Yes No   Sig: TAKE 81MG BY MOUTH EVERY DAY   b complex vitamins capsule  Self Yes No   Sig: Take 1 capsule by mouth daily   cholecalciferol (VITAMIN D3) 400 units tablet  Self Yes No   Sig: Take 800 Units by mouth daily    diltiazem (DILACOR XR) 240 MG 24 hr capsule   No No   Sig: Take 1 capsule (240 mg total) by mouth daily   lisinopril-hydrochlorothiazide (PRINZIDE,ZESTORETIC) 20-25 MG per tablet  Self Yes No   Sig: Take 1 tablet by mouth daily   magnesium 30 MG tablet  Self Yes No   Sig: Take 30 mg by mouth daily   pravastatin (PRAVACHOL) 40 mg tablet  Self Yes No   Sig: TAKE ONE TABLET BY MOUTH AT BEDTIME INSTEAD OF FLUVASTATIN FOR CHOLESTEROL      Facility-Administered Medications: None Past Medical History:   Diagnosis Date    Hx of blood clots     Hyperlipidemia     Hypertension        Past Surgical History:   Procedure Laterality Date    HERNIA REPAIR      KNEE ARTHROSCOPY Right 2015    PROSTATE SURGERY         Family History   Problem Relation Age of Onset    Lung cancer Mother     Ulcers Father     No Known Problems Sister     Heart attack Brother     Alcohol abuse Brother     Kidney disease Brother      I have reviewed and agree with the history as documented  E-Cigarette/Vaping    E-Cigarette Use Never User      E-Cigarette/Vaping Substances    Nicotine No     THC No     CBD No     Flavoring No     Other No     Unknown No      Social History     Tobacco Use    Smoking status: Former Smoker     Quit date:      Years since quittin 3    Smokeless tobacco: Never Used    Tobacco comment: 2+ppd x 20 years    Vaping Use    Vaping Use: Never used   Substance Use Topics    Alcohol use: Not Currently    Drug use: Not Currently       Review of Systems   Constitutional: Positive for fatigue and fever  Negative for chills  HENT: Negative for congestion, ear pain, rhinorrhea, sinus pressure, sneezing and sore throat  Eyes: Negative for pain and discharge  Respiratory: Positive for shortness of breath  Negative for cough, choking, chest tightness and wheezing  Cardiovascular: Negative for chest pain and palpitations  Gastrointestinal: Negative for abdominal pain, constipation, diarrhea, nausea and vomiting  Genitourinary: Negative for difficulty urinating and dysuria  Musculoskeletal: Negative for back pain, gait problem, neck pain and neck stiffness  Neurological: Negative for dizziness, light-headedness and headaches  All other systems reviewed and are negative  Physical Exam  Physical Exam  Vitals and nursing note reviewed  Constitutional:       General: He is not in acute distress  Appearance: He is well-developed   He is not ill-appearing  HENT:      Head: Normocephalic and atraumatic  Right Ear: External ear normal       Left Ear: External ear normal       Nose: Nose normal       Mouth/Throat:      Mouth: Mucous membranes are moist    Eyes:      Pupils: Pupils are equal, round, and reactive to light  Cardiovascular:      Rate and Rhythm: Normal rate and regular rhythm  Heart sounds: Normal heart sounds  No murmur heard  No friction rub  No gallop  Pulmonary:      Effort: Pulmonary effort is normal  No respiratory distress  Breath sounds: Normal breath sounds  No stridor  No wheezing or rales  Abdominal:      General: Bowel sounds are normal  There is no distension  Palpations: Abdomen is soft  Tenderness: There is no abdominal tenderness  There is no guarding  Musculoskeletal:         General: No tenderness  Normal range of motion  Cervical back: Normal range of motion and neck supple  Skin:     General: Skin is warm  Capillary Refill: Capillary refill takes less than 2 seconds  Neurological:      Mental Status: He is alert and oriented to person, place, and time  Psychiatric:         Behavior: Behavior is cooperative           Vital Signs  ED Triage Vitals   Temperature Pulse Respirations Blood Pressure SpO2   04/18/22 1011 04/18/22 1011 04/18/22 1011 04/18/22 1020 04/18/22 1011   (!) 100 6 °F (38 1 °C) 77 (!) 24 159/75 (!) 88 %      Temp Source Heart Rate Source Patient Position - Orthostatic VS BP Location FiO2 (%)   04/18/22 1011 04/18/22 1011 04/18/22 1230 04/18/22 1230 --   Tympanic Monitor Sitting Left arm       Pain Score       04/18/22 1011       No Pain           Vitals:    04/18/22 1011 04/18/22 1020 04/18/22 1230 04/18/22 1300   BP:  159/75 143/65 155/70   Pulse: 77  80 80   Patient Position - Orthostatic VS:   Sitting Lying         Visual Acuity      ED Medications  Medications   heparin (porcine) 25,000 units in 0 45% NaCl 250 mL infusion (premix) (18 Units/kg/hr × 80 kg (Order-Specific) Intravenous New Bag 4/18/22 1324)   heparin (porcine) injection 6,400 Units (has no administration in time range)   heparin (porcine) injection 3,200 Units (has no administration in time range)   cefepime (MAXIPIME) IVPB (premix in dextrose) 2,000 mg 50 mL (0 mg Intravenous Stopped 4/18/22 1119)   sodium chloride 0 9 % bolus 1,000 mL (0 mL Intravenous Stopped 4/18/22 1127)   iohexol (OMNIPAQUE) 350 MG/ML injection (SINGLE-DOSE) 100 mL (100 mL Intravenous Given 4/18/22 1124)   heparin (porcine) injection 6,400 Units (6,400 Units Intravenous Given 4/18/22 1324)       Diagnostic Studies  Results Reviewed     Procedure Component Value Units Date/Time    APTT [[id number]]  (Normal) Collected: 04/18/22 1315    Lab Status: Final result Specimen: Blood from Arm, Right Updated: 04/18/22 1342     PTT 30 seconds     Protime-INR [[id number]]  (Abnormal) Collected: 04/18/22 1315    Lab Status: Final result Specimen: Blood from Arm, Right Updated: 04/18/22 1342     Protime 15 2 seconds      INR 1 21    Prothrombin gene mutation [[id number]] Collected: 04/18/22 1315    Lab Status: In process Specimen: Blood from Arm, Right Updated: 04/18/22 1320    Protein S activity [[id number]] Collected: 04/18/22 1315    Lab Status: In process Specimen: Blood from Arm, Right Updated: 04/18/22 1320    Antithrombin III Activity [[id number]] Collected: 04/18/22 1316    Lab Status: In process Specimen: Blood from Arm, Right Updated: 04/18/22 1319    Beta-2 glycoprotein antibodies [[id number]] Collected: 04/18/22 1315    Lab Status: In process Specimen: Blood from Arm, Right Updated: 04/18/22 1319    Cardiolipin antibody [[id number]] Collected: 04/18/22 1315    Lab Status: In process Specimen: Blood from Arm, Right Updated: 04/18/22 1319    Protein S Antigen, Total & Free [[id number]] Collected: 04/18/22 1315    Lab Status:  In process Specimen: Blood from Arm, Right Updated: 04/18/22 1319    Factor 5 leiellie [[id number]] Collected: 04/18/22 1315    Lab Status: In process Specimen: Blood from Arm, Right Updated: 04/18/22 1319    Protein C activity [[id number]] Collected: 04/18/22 1315    Lab Status: In process Specimen: Blood from Arm, Right Updated: 04/18/22 1319    Lupus anticoagulant [[id number]] Collected: 04/18/22 1316    Lab Status:  In process Specimen: Blood from Arm, Right Updated: 04/18/22 1319    HS Troponin 0hr (reflex protocol) [[id number]]  (Normal) Collected: 04/18/22 1248    Lab Status: Final result Specimen: Blood from Arm, Right Updated: 04/18/22 1315     hs TnI 0hr 24 ng/L     HS Troponin I 2hr [[id number]]     Lab Status: No result Specimen: Blood     Urine Microscopic [[id number]]  (Normal) Collected: 04/18/22 1249    Lab Status: Final result Specimen: Urine, Clean Catch Updated: 04/18/22 1259     RBC, UA None Seen /hpf      WBC, UA None Seen /hpf      Epithelial Cells None Seen /hpf      Bacteria, UA None Seen /hpf     B-Type Natriuretic Peptide(BNP) CA, GH, EA Campuses Only [[id number]]  (Abnormal) Collected: 04/18/22 1027    Lab Status: Final result Specimen: Blood from Arm, Right Updated: 04/18/22 1255      pg/mL     UA w Reflex to Microscopic w Reflex to Culture [[id number]]  (Abnormal) Collected: 04/18/22 1249    Lab Status: Final result Specimen: Urine, Clean Catch Updated: 04/18/22 1254     Color, UA Yellow     Clarity, UA Clear     Specific Gravity, UA 1 015     pH, UA 5 0     Leukocytes, UA Negative     Nitrite, UA Negative     Protein, UA 1+ mg/dl      Glucose, UA Negative mg/dl      Ketones, UA Negative mg/dl      Urobilinogen, UA 0 2 E U /dl      Bilirubin, UA Negative     Blood, UA Negative    COVID/FLU/RSV - 2 hour TAT [[id number]]  (Abnormal) Collected: 04/18/22 1042    Lab Status: Final result Specimen: Nares from Nose Updated: 04/18/22 1131     SARS-CoV-2 Negative     INFLUENZA A PCR Positive     INFLUENZA B PCR Negative     RSV PCR Negative    Narrative:      FOR PEDIATRIC PATIENTS - copy/paste COVID Guidelines URL to browser: [email or web address]    SARS-CoV-2 assay is a Nucleic Acid Amplification assay intended for the  qualitative detection of nucleic acid from SARS-CoV-2 in nasopharyngeal  swabs  Results are for the presumptive identification of SARS-CoV-2 RNA  Positive results are indicative of infection with SARS-CoV-2, the virus  causing COVID-19, but do not rule out bacterial infection or co-infection  with other viruses  Laboratories within the [place] and its  territories are required to report all positive results to the appropriate  public health authorities  Negative results do not preclude SARS-CoV-2  infection and should not be used as the sole basis for treatment or other  patient management decisions  Negative results must be combined with  clinical observations, patient history, and epidemiological information  This test has not been FDA cleared or approved  This test has been authorized by FDA under an Emergency Use Authorization  (EUA)  This test is only authorized for the duration of time the  declaration that circumstances exist justifying the authorization of the  emergency use of an in vitro diagnostic tests for detection of SARS-CoV-2  virus and/or diagnosis of COVID-19 infection under section 564(b)(1) of  the Act, 21 U  S C  508IVN-7(B)(3), unless the authorization is terminated  or revoked sooner  The test has been validated but independent review by FDA  and CLIA is pending  Test performed using [employer] Gene[employer]pert: This RT-PCR assay targets N2,  a region unique to SARS-CoV-2  A conserved region in the E-gene was chosen  for pan-Sarbecovirus detection which includes SARS-CoV-2      Protime-INR [[id number]]  (Abnormal) Collected: 04/18/22 1027    Lab Status: Final result Specimen: Blood from Arm, Right Updated: 04/18/22 1122     Protime 14 7 seconds      INR 1 16    APTT [[id number]]  (Normal) Collected: 04/18/22 1027 Lab Status: Final result Specimen: Blood from Arm, Right Updated: 04/18/22 1122     PTT 32 seconds     Procalcitonin [[id number]]  (Normal) Collected: 04/18/22 1027    Lab Status: Final result Specimen: Blood from Arm, Right Updated: 04/18/22 1106     Procalcitonin 0 10 ng/ml     Comprehensive metabolic panel [[id number]]  (Abnormal) Collected: 04/18/22 1027    Lab Status: Final result Specimen: Blood from Arm, Right Updated: 04/18/22 1056     Sodium 136 mmol/L      Potassium 3 7 mmol/L      Chloride 99 mmol/L      CO2 31 mmol/L      ANION GAP 6 mmol/L      BUN 29 mg/dL      Creatinine 1 62 mg/dL      Glucose 126 mg/dL      Calcium 9 3 mg/dL      AST 13 U/L      ALT 17 U/L      Alkaline Phosphatase 46 U/L      Total Protein 7 3 g/dL      Albumin 3 8 g/dL      Total Bilirubin 0 63 mg/dL      eGFR 38 ml/min/1 73sq m     Narrative:      [place] guidelines for Chronic Kidney Disease (CKD):     Stage 1 with normal or high GFR (GFR > 90 mL/min/1 73 square meters)    Stage 2 Mild CKD (GFR = 60-89 mL/min/1 73 square meters)    Stage 3A Moderate CKD (GFR = 45-59 mL/min/1 73 square meters)    Stage 3B Moderate CKD (GFR = 30-44 mL/min/1 73 square meters)    Stage 4 Severe CKD (GFR = 15-29 mL/min/1 73 square meters)    Stage 5 End Stage CKD (GFR <15 mL/min/1 73 square meters)  Note: GFR calculation is accurate only with a steady state creatinine    Lactic acid [[id number]]  (Normal) Collected: 04/18/22 1027    Lab Status: Final result Specimen: Blood from Arm, Right Updated: 04/18/22 1056     LACTIC ACID 1 1 mmol/L     Narrative:      Result may be elevated if tourniquet was used during collection  Lipase [[id number]]  (Normal) Collected: 04/18/22 1027    Lab Status: Final result Specimen: Blood from Arm, Right Updated: 04/18/22 1056     Lipase 23 u/L     Blood culture #1 [[id number]] Collected: 04/18/22 1042    Lab Status:  In process Specimen: Blood from Arm, Right Updated: 04/18/22 1046    CBC and differential [[id number]]  (Abnormal) Collected: 04/18/22 1027    Lab Status: Final result Specimen: Blood from Arm, Right Updated: 04/18/22 1035     WBC 15 07 Thousand/uL      RBC 4 60 Million/uL      Hemoglobin 15 3 g/dL      Hematocrit 46 6 %       fL      MCH 33 3 pg      MCHC 32 8 g/dL      RDW 13 0 %      MPV 10 0 fL      Platelets 138 Thousands/uL      nRBC 0 /100 WBCs      Neutrophils Relative 79 %      Immat GRANS % 1 %      Lymphocytes Relative 9 %      Monocytes Relative 11 %      Eosinophils Relative 0 %      Basophils Relative 0 %      Neutrophils Absolute 11 90 Thousands/µL      Immature Grans Absolute 0 07 Thousand/uL      Lymphocytes Absolute 1 35 Thousands/µL      Monocytes Absolute 1 67 Thousand/µL      Eosinophils Absolute 0 05 Thousand/µL      Basophils Absolute 0 03 Thousands/µL     Blood culture #2 [[id number]] Collected: 04/18/22 1027    Lab Status: In process Specimen: Blood from Arm, Right Updated: 04/18/22 1031                 CT chest abdomen pelvis w contrast   Final Result by Darcy Oliveros MD (04/18 1205)      Large pulmonary embolism in the right lower lobe with associated infiltrate  Some right heart strain could be present at the RV LV ratio being approximately 1 although judged with standard CT imaging  Possible DVT in the left femoral vein  No evidence of mass or adenopathy exception of one mildly prominent retroperitoneal node  Fractures of the left 10th and 11th ribs one of which demonstrates possible lytic component suggesting possible pathologic fracture  Bone scan may be useful given the presence of PE  Scattered renal cysts one of which may be mildly hyperdense measuring 1 1 cm  This is adjacent to a larger cyst  Follow-up focused ultrasound be useful        Mild small bowel dilatation without obvious of transition likely related to ileus follow-up may be helpful if symptoms should persist       This critical finding was discussed with Xuan Powell NICOLAS Fields at 12 noon      Workstation performed: [id number]         XR chest portable   Final Result by Brian Bell DO (04/18 1101)      Question developing left perihilar infiltrate, possibly pneumonia  Follow-up advised  Workstation performed: [id number]         VAS lower limb venous duplex study, complete bilateral    (Results Pending)              Procedures  Procedures         ED Course  ED Course as of 04/18/22 1347   Mon Apr 18, 2022   1037 WBC(!): 15 07   1201 INFLU A PCR(!): Positive   1201 WBC(!): 15 07   1202 Creatinine(!): 1 62                               SBIRT 22yo+      Most Recent Value   SBIRT (23 yo +)    In order to provide better care to our patients, we are screening all of our patients for alcohol and drug use  Would it be okay to ask you these screening questions? 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  Further workup should be considered for potential undiagnosed underlying malignancy  Patient agreeable to hospital admission  Case reviewed with critical care         Amount and/or Complexity of Data Reviewed  Clinical lab tests: ordered and reviewed  Tests in the radiology section of CPT®: ordered and reviewed    Risk of Complications, Morbidity, and/or Mortality  Presenting problems: moderate  Diagnostic procedures: low  Management options: low    Patient Progress  Patient progress: stable      Disposition  Final diagnoses:   Pulmonary embolus ([address] )   Flu     Time reflects when diagnosis was documented in both MDM as applicable and the Disposition within this note     Time User Action Codes Description Comment    4/18/2022 12:58 PM Ples Brace Add [I26 99] Pulmonary embolus ([address] )     4/18/2022 12:59 PM Ples Brace Add [J11 1] Flu       ED Disposition     ED Disposition Condition Date/Time Comment    Admit Stable Mon Apr 18, 2022 12:58 PM Case was discussed with Courtney Win and the patient's admission status was agreed to be Admission Status: inpatient status to the service of Dr Maryanne Smith   Follow-up Information    None         Patient's Medications   Discharge Prescriptions    No medications on file       No discharge procedures on file      PDMP Review     None          ED Provider  Electronically Signed by           Brooklyn Carpio PA-C  04/18/22 7491

## 2022-04-18 NOTE — H&P
[address] 1940, 78 y o  male MRN: [id number]  Unit/Bed#: ICU 03-01 Encounter: [id number]  Primary Care Provider: Niki Hernandez DO   Date and time admitted to hospital: 4/18/2022 10:04 AM    * Pulmonary embolus [facility])  Assessment & Plan  · With history of right lower extremity DVT  · This is unprovoked  Concerning for possible malignancy underlying as CT chest, abdomen and pelvis with contrast shows fractures of the left 10th and 11th ribs suggesting possible pathologic fracture  · Consult hematology/oncology  · Started on heparin drip in the ED; will continue  · Obtain echocardiogram  · Continue supportive care  · Likely transition to Eliquis or apixaban pending Hematology evaluation    Sepsis [facility])  Assessment & Plan  · The patient meets SIRS criteria with tachypnea, leukocytosis, and fever with underlying infection of influenza A  Ruling out possible bacterial pneumonia  · Continue treatment with Tamiflu  · Received 1 dose of cefepime in the ED will continue  Adding azithromycin  · Obtain strep and Legionella  · Obtain procalcitonin  · Trend CBC    Abnormal CT scan  Assessment & Plan  · CT chest, abdomen and pelvis shows:  · Fractures of the left 10th and 11th ribs possible pathologic fracture- will obtain bone scan  · Scattered renal cyst 1 of which is mildly hyperdense measuring 1 1 cm- will obtain renal ultrasound  · Mild small bowel dilatation- without off his transition likely related to possible ileus- discussed case with surgery on-call; as the patient has no symptoms of nausea, vomiting at this time is okay for conservative management at this time  Will obtain KUB  if worsening symptoms will need to consult surgery        PATRICK (acute kidney injury) ([facility] [address] )  Assessment & Plan  · No baseline creatinine however previous level- 1 24 mg/dL   · Presented with creatinine 1 62 mg/dL  · Will hold lisinopril and hydrochlorothiazide  · Obtain renal ultrasound  · Check PVR  · Monitor renal function closely, avoid hypotension and nephrotoxins  · Will give gentle IV fluid    Influenza A  Assessment & Plan  · With acute respiratory failure hypoxia; SpO2 of 88% on room air  · Started Tamiflu 30 mg b i d  X5 days  · Supportive care  · Respiratory protocol  · Titrated off of oxygen as tolerated  Essential hypertension  Assessment & Plan  · Blood pressure noted to be elevated  · Will hold off on lisinopril and hydrochlorothiazide at this time due to PATRICK  · Continue with diltiazem  · May use p r n  Hydralazine to help with blood pressure control    VTE Prophylaxis: Heparin Drip  Code Status: full code   Discussion with family:  Son at bedside    Anticipated Length of Stay:  Patient will be admitted on an Inpatient basis with an anticipated length of stay of  > 2 midnights  Justification for Hospital Stay:  Generalized weakness    Chief Complaint:   Generalized weakness    History of Present Illness:    Gogo Glass is a 78 y o  male who presents with generalized weakness and shortness of breath  The patient with past medical history of hypertension and history of right leg DVT  The patient presented today with worsening generalized weakness and worsening dyspnea  The patient states that that he has been feeling sick since last Monday however it has been worse over the past few days  He reports rhinorrhea, some sore throat and dyspnea which woke him up last night  In the ED, the patient found to be hypoxia and positive for influenza A  CT scan of chest, abdomen, and pelvis with IV contrast also shows large PE  He also meets sepsis criteria and subsequently is being admitted  The son reports that family also is sick with walking pneumonia  The patient denies any chest pain, chest pain or exertion, dyspnea at rest, nausea, vomiting, abdominal pain, or diarrhea  He denies any urinary symptoms        Review of Systems:    Review of Systems Constitutional: Positive for fatigue and fever  Negative for chills  HENT: Positive for rhinorrhea  Negative for congestion, ear pain, postnasal drip and sore throat  Eyes: Negative for discharge, itching and visual disturbance  Respiratory: Positive for cough and shortness of breath  Negative for chest tightness  Cardiovascular: Negative for chest pain, palpitations and leg swelling  Gastrointestinal: Negative for abdominal pain, nausea and vomiting  Endocrine: Negative for cold intolerance and heat intolerance  Genitourinary: Negative for difficulty urinating, dysuria and hematuria  Musculoskeletal: Negative for back pain, gait problem and neck pain  Skin: Negative for rash and wound  Neurological: Negative for dizziness, speech difficulty, weakness, light-headedness and headaches  Psychiatric/Behavioral: Negative for behavioral problems, confusion and decreased concentration  Past Medical and Surgical History:     Past Medical History:   Diagnosis Date    Hx of blood clots     Hyperlipidemia     Hypertension        Past Surgical History:   Procedure Laterality Date    HERNIA REPAIR      KNEE ARTHROSCOPY Right 07/31/2015    PROSTATE SURGERY         Meds/Allergies:    Prior to Admission medications    Medication Sig Start Date End Date Taking?  Authorizing Provider   ascorbic Acid (VITAMIN C) 500 MG CPCR Take 500 mg by mouth daily    Historical Provider, MD   aspirin 81 mg chewable tablet TAKE 81MG BY MOUTH EVERY DAY    Historical Provider, MD   b complex vitamins capsule Take 1 capsule by mouth daily    Historical Provider, MD   Boswellia-Glucosamine-Vit D (OSTEO BI-FLEX ONE PER DAY PO) Take by mouth      Historical Provider, MD   cholecalciferol (VITAMIN D3) 400 units tablet Take 800 Units by mouth daily     Historical Provider, MD   diltiazem (DILACOR XR) 240 MG 24 hr capsule Take 1 capsule (240 mg total) by mouth daily 9/8/21   Brooke Calabrese DO lisinopril-hydrochlorothiazide (PRINZIDE,ZESTORETIC) 20-25 MG per tablet Take 1 tablet by mouth daily    Historical Provider, MD   magnesium 30 MG tablet Take 30 mg by mouth daily    Historical Provider, MD   Omega-3 Fatty Acids (fish oil) 1,000 mg Take 2,000 mg by mouth daily    Historical Provider, MD   Potassium 99 MG TABS Take 1 tablet by mouth daily  Patient not taking: Reported on 3/9/2022     Historical Provider, MD   pravastatin (PRAVACHOL) 40 mg tablet TAKE ONE TABLET BY MOUTH AT BEDTIME INSTEAD OF FLUVASTATIN FOR CHOLESTEROL    Historical Provider, MD     all medications and allergies reviewed    Allergies: No Known Allergies    Social History:     Marital Status: /Civil Union   Occupation: retired   Patient Pre-hospital Living Situation:  Family  Patient Pre-hospital Level of Mobility:  Independent  Patient Pre-hospital Diet Restrictions:  Cardiac  Substance Use History:   Social History     Substance and Sexual Activity   Alcohol Use Not Currently     Social History     Tobacco Use   Smoking Status Former Smoker    Quit date:     Years since quittin 3   Smokeless Tobacco Never Used   Tobacco Comment    2+ppd x 20 years      Social History     Substance and Sexual Activity   Drug Use Not Currently       Family History:  I have reviewed the patient's family history    Physical Exam:     Vitals:   Blood Pressure: (!) 177/79 (22 1530)  Pulse: 84 (22 1437)  Temperature: 98 °F (36 7 °C) (22 1400)  Temp Source: Oral (22 1437)  Respirations: (!) 23 (22 1437)  Height: 5' 8" (172 7 cm) (22 1437)  Weight - Scale: 82 1 kg (181 lb) (22 1437)  SpO2: 97 % (22 1437)    Physical Exam  Vitals and nursing note reviewed  Constitutional:       General: He is not in acute distress  Appearance: Normal appearance  HENT:      Head: Normocephalic and atraumatic        Right Ear: External ear normal       Left Ear: External ear normal       Nose: Nose normal       Mouth/Throat:      Mouth: Mucous membranes are moist       Pharynx: Oropharynx is clear  Eyes:      General:         Right eye: No discharge  Left eye: No discharge  Extraocular Movements: Extraocular movements intact  Pupils: Pupils are equal, round, and reactive to light  Cardiovascular:      Rate and Rhythm: Normal rate and regular rhythm  Pulses: Normal pulses  Heart sounds: Normal heart sounds  No murmur heard  Pulmonary:      Effort: Pulmonary effort is normal  No respiratory distress  Breath sounds: Wheezing present  No rales  Comments: Decreased throughout     Abdominal:      General: Bowel sounds are normal  There is no distension  Palpations: Abdomen is soft  There is no mass  Tenderness: There is no abdominal tenderness  Musculoskeletal:         General: No swelling, tenderness or deformity  Normal range of motion  Cervical back: Normal range of motion and neck supple  No rigidity  Skin:     General: Skin is warm and dry  Capillary Refill: Capillary refill takes less than 2 seconds  Coloration: Skin is pale  Findings: No erythema  Neurological:      General: No focal deficit present  Mental Status: He is alert and oriented to person, place, and time  Mental status is at baseline  Psychiatric:         Mood and Affect: Mood normal          Behavior: Behavior normal          Thought Content: Thought content normal          Judgment: Judgment normal          Additional Data:     Lab Results: I have personally reviewed pertinent reports        Results from last 7 days   Lab Units 04/18/22  1027   WBC Thousand/uL 15 07*   HEMOGLOBIN g/dL 15 3   HEMATOCRIT % 46 6   PLATELETS Thousands/uL 183   NEUTROS PCT % 79*   LYMPHS PCT % 9*   MONOS PCT % 11   EOS PCT % 0     Results from last 7 days   Lab Units 04/18/22  1027   SODIUM mmol/L 136   POTASSIUM mmol/L 3 7   CHLORIDE mmol/L 99   CO2 mmol/L 31   BUN mg/dL 29* CREATININE mg/dL 1 62*   ANION GAP mmol/L 6   CALCIUM mg/dL 9 3   ALBUMIN g/dL 3 8   TOTAL BILIRUBIN mg/dL 0 63   ALK PHOS U/L 46   ALT U/L 17   AST U/L 13   GLUCOSE RANDOM mg/dL 126     Results from last 7 days   Lab Units 04/18/22  1315   INR  1 21*             Results from last 7 days   Lab Units 04/18/22  1027   LACTIC ACID mmol/L 1 1   PROCALCITONIN ng/ml 0 10       Imaging: I have personally reviewed pertinent reports  CT chest abdomen pelvis w contrast   Final Result by Roberto Baeza MD (04/18 1205)      Large pulmonary embolism in the right lower lobe with associated infiltrate  Some right heart strain could be present at the RV LV ratio being approximately 1 although judged with standard CT imaging  Possible DVT in the left femoral vein  No evidence of mass or adenopathy exception of one mildly prominent retroperitoneal node  Fractures of the left 10th and 11th ribs one of which demonstrates possible lytic component suggesting possible pathologic fracture  Bone scan may be useful given the presence of PE  Scattered renal cysts one of which may be mildly hyperdense measuring 1 1 cm  This is adjacent to a larger cyst  Follow-up focused ultrasound be useful  Mild small bowel dilatation without obvious of transition likely related to ileus follow-up may be helpful if symptoms should persist       This critical finding was discussed with NICOLAS aBi at 12 noon      Workstation performed: [id number]         XR chest portable   Final Result by Justus Wolf DO (04/18 1101)      Question developing left perihilar infiltrate, possibly pneumonia  Follow-up advised              Workstation performed: [id number]         VAS lower limb venous duplex study, complete bilateral    (Results Pending)   US kidney and bladder    (Results Pending)   XR abdomen 1 view kub    (Results Pending)   XR bone length (scanogram)    (Results Pending)         EKG, Pathology, and Other Studies Reviewed on Admission:   · EKG: n/a     Epic / Care Everywhere Records Reviewed: Yes    ** Please Note: This note has been constructed using a voice recognition system   **

## 2022-04-18 NOTE — ASSESSMENT & PLAN NOTE
· The patient meets SIRS criteria with tachypnea, leukocytosis, and fever with underlying infection of influenza A  Ruling out possible bacterial pneumonia  · Continue treatment with Tamiflu  · Received 1 dose of cefepime in the ED will continue  Adding azithromycin    · Obtain strep and Legionella  · Obtain procalcitonin  · Trend CBC

## 2022-04-18 NOTE — ASSESSMENT & PLAN NOTE
· CT chest, abdomen and pelvis shows:  · Fractures of the left 10th and 11th ribs possible pathologic fracture- will obtain bone scan  · Scattered renal cyst 1 of which is mildly hyperdense measuring 1 1 cm- will obtain renal ultrasound  · Mild small bowel dilatation- without off his transition likely related to possible ileus- discussed case with surgery on-call; as the patient has no symptoms of nausea, vomiting at this time is okay for conservative management at this time  Will obtain KUB  if worsening symptoms will need to consult surgery

## 2022-04-18 NOTE — RESPIRATORY THERAPY NOTE
RT Protocol Note  Jose Copeland 78 y o  male MRN: [id number]  Unit/Bed#: ICU  Encounter: [id number]    Assessment    Principal Problem:    [facility] )  Active Problems:    Essential hypertension    Influenza A    PATRICK (acute kidney injury) ([facility] [address] )    Abnormal CT scan    Sepsis ([facility] )      Home Pulmonary Medications:  none       Past Medical History:   Diagnosis Date    Hx of blood clots     Hyperlipidemia     Hypertension      Social History     Socioeconomic History    Marital status: /Civil Union     Spouse name: None    Number of children: None    Years of education: None    Highest education level: None   Occupational History    None   Tobacco Use    Smoking status: Former Smoker     Quit date:      Years since quittin 3    Smokeless tobacco: Never Used    Tobacco comment: 2+ppd x 20 years    Vaping Use    Vaping Use: Never used   Substance and Sexual Activity    Alcohol use: Not Currently    Drug use: Not Currently    Sexual activity: Not Currently   Other Topics Concern    None   Social History Narrative    Retired, lives with his wife, boubacar and Maame [address] [place] [id number]     Social Determinants of Health     Financial Resource Strain: Not on file   Food Insecurity: Not on file   Transportation Needs: Not on file   Physical Activity: Not on file   Stress: Not on file   Social Connections: Not on file   Intimate Partner Violence: Not on file   Housing Stability: Not on file       Subjective         Objective    Physical Exam:   Assessment Type: Assess only  General Appearance: Alert,Awake  Respiratory Pattern: Dyspnea at rest  Chest Assessment: Chest expansion symmetrical  Bilateral Breath Sounds: Diminished,Crackles  Cough: Productive  O2 Device: nc    Vitals:  Blood pressure (!) 177/79, pulse 84, temperature 98 °F (36 7 °C), temperature source Oral, resp   rate (!) 23, height 5' 8" (1 727 m), weight 82 1 kg (181 lb), SpO2 (P) 97 %           Imaging and other studies: I have personally reviewed pertinent reports  O2 Device: nc     Plan    Respiratory Plan: Mild Distress pathway        Resp Comments: Pt  admitted with pulmonary embolus  + flu  Pt has no pulm hx or meds  MIld SOB noted  BS are diminished with some crackles  Will order prn udn for wheezing

## 2022-04-19 ENCOUNTER — APPOINTMENT (INPATIENT)
Dept: NON INVASIVE DIAGNOSTICS | Facility: HOSPITAL | Age: 82
DRG: 871 | End: 2022-04-19
Payer: COMMERCIAL

## 2022-04-19 ENCOUNTER — APPOINTMENT (INPATIENT)
Dept: NUCLEAR MEDICINE | Facility: HOSPITAL | Age: 82
DRG: 871 | End: 2022-04-19
Payer: COMMERCIAL

## 2022-04-19 ENCOUNTER — APPOINTMENT (INPATIENT)
Dept: RADIOLOGY | Facility: HOSPITAL | Age: 82
DRG: 871 | End: 2022-04-19
Payer: COMMERCIAL

## 2022-04-19 LAB
ALBUMIN SERPL BCP-MCNC: 3.2 G/DL (ref 3.5–5)
ALP SERPL-CCNC: 38 U/L (ref 34–104)
ALT SERPL W P-5'-P-CCNC: 17 U/L (ref 7–52)
ANION GAP SERPL CALCULATED.3IONS-SCNC: 8 MMOL/L (ref 4–13)
AORTIC ROOT: 3 CM
AORTIC VALVE MEAN VELOCITY: 12.6 M/S
APICAL FOUR CHAMBER EJECTION FRACTION: 43 %
APTT PPP: 57 SECONDS (ref 23–37)
APTT PPP: 73 SECONDS (ref 23–37)
APTT PPP: 79 SECONDS (ref 23–37)
AST SERPL W P-5'-P-CCNC: 14 U/L (ref 13–39)
ATRIAL RATE: 79 BPM
AV LVOT MEAN GRADIENT: 3 MMHG
AV LVOT PEAK GRADIENT: 4 MMHG
AV MEAN GRADIENT: 7 MMHG
AV PEAK GRADIENT: 14 MMHG
AV VELOCITY RATIO: 0.55
B2 GLYCOPROT1 IGA SERPL IA-ACNC: 2.7
B2 GLYCOPROT1 IGG SERPL IA-ACNC: 0.8
B2 GLYCOPROT1 IGM SERPL IA-ACNC: 4.8
BASOPHILS # BLD AUTO: 0.05 THOUSANDS/ΜL (ref 0–0.1)
BASOPHILS NFR BLD AUTO: 0 % (ref 0–1)
BILIRUB SERPL-MCNC: 0.57 MG/DL (ref 0.2–1)
BUN SERPL-MCNC: 24 MG/DL (ref 5–25)
CALCIUM ALBUM COR SERPL-MCNC: 9.4 MG/DL (ref 8.3–10.1)
CALCIUM SERPL-MCNC: 8.8 MG/DL (ref 8.4–10.2)
CARDIOLIPIN IGA SER IA-ACNC: 3.2
CARDIOLIPIN IGG SER IA-ACNC: 3.5
CARDIOLIPIN IGM SER IA-ACNC: 4.5
CHLORIDE SERPL-SCNC: 102 MMOL/L (ref 96–108)
CO2 SERPL-SCNC: 26 MMOL/L (ref 21–32)
CREAT SERPL-MCNC: 1.4 MG/DL (ref 0.6–1.3)
DOP CALC AO PEAK VEL: 1.9 M/S
DOP CALC AO VTI: 36.57 CM
DOP CALC LVOT PEAK VEL VTI: 22.71 CM
DOP CALC LVOT PEAK VEL: 1.05 M/S
E WAVE DECELERATION TIME: 276 MS
EOSINOPHIL # BLD AUTO: 0.13 THOUSAND/ΜL (ref 0–0.61)
EOSINOPHIL NFR BLD AUTO: 1 % (ref 0–6)
ERYTHROCYTE [DISTWIDTH] IN BLOOD BY AUTOMATED COUNT: 13.2 % (ref 11.6–15.1)
FRACTIONAL SHORTENING: 37 % (ref 28–44)
GFR SERPL CREATININE-BSD FRML MDRD: 46 ML/MIN/1.73SQ M
GLUCOSE SERPL-MCNC: 120 MG/DL (ref 65–140)
HCT VFR BLD AUTO: 41.7 % (ref 36.5–49.3)
HGB BLD-MCNC: 13.6 G/DL (ref 12–17)
IMM GRANULOCYTES # BLD AUTO: 0.09 THOUSAND/UL (ref 0–0.2)
IMM GRANULOCYTES NFR BLD AUTO: 1 % (ref 0–2)
INTERVENTRICULAR SEPTUM IN DIASTOLE (PARASTERNAL SHORT AXIS VIEW): 1.1 CM
INTERVENTRICULAR SEPTUM: 1.1 CM (ref 0.53–1)
L PNEUMO1 AG UR QL IA.RAPID: NEGATIVE
LAAS-AP4: 16.6 CM2
LEFT ATRIUM SIZE: 3.8 CM
LEFT INTERNAL DIMENSION IN SYSTOLE: 2.4 CM (ref 2.94–4.45)
LEFT VENTRICULAR INTERNAL DIMENSION IN DIASTOLE: 3.8 CM (ref 4.83–7.19)
LEFT VENTRICULAR POSTERIOR WALL IN END DIASTOLE: 0.9 CM (ref 0.52–0.99)
LEFT VENTRICULAR STROKE VOLUME: 41 ML
LVSV (TEICH): 41 ML
LYMPHOCYTES # BLD AUTO: 1.17 THOUSANDS/ΜL (ref 0.6–4.47)
LYMPHOCYTES NFR BLD AUTO: 8 % (ref 14–44)
MAGNESIUM SERPL-MCNC: 2 MG/DL (ref 1.9–2.7)
MCH RBC QN AUTO: 33.4 PG (ref 26.8–34.3)
MCHC RBC AUTO-ENTMCNC: 32.6 G/DL (ref 31.4–37.4)
MCV RBC AUTO: 103 FL (ref 82–98)
MONOCYTES # BLD AUTO: 1.72 THOUSAND/ΜL (ref 0.17–1.22)
MONOCYTES NFR BLD AUTO: 12 % (ref 4–12)
MV E'TISSUE VEL-SEP: 8 CM/S
MV PEAK A VEL: 0.96 M/S
MV PEAK E VEL: 67 CM/S
MV STENOSIS PRESSURE HALF TIME: 80 MS
MV VALVE AREA P 1/2 METHOD: 2.75 CM2
NEUTROPHILS # BLD AUTO: 10.83 THOUSANDS/ΜL (ref 1.85–7.62)
NEUTS SEG NFR BLD AUTO: 78 % (ref 43–75)
NRBC BLD AUTO-RTO: 0 /100 WBCS
P AXIS: 87 DEGREES
PHOSPHATE SERPL-MCNC: 3 MG/DL (ref 2.3–4.1)
PLATELET # BLD AUTO: 162 THOUSANDS/UL (ref 149–390)
PMV BLD AUTO: 10.3 FL (ref 8.9–12.7)
POTASSIUM SERPL-SCNC: 3.6 MMOL/L (ref 3.5–5.3)
PR INTERVAL: 184 MS
PROCALCITONIN SERPL-MCNC: 0.13 NG/ML
PROT SERPL-MCNC: 6 G/DL (ref 6.4–8.4)
QRS AXIS: 51 DEGREES
QRSD INTERVAL: 92 MS
QT INTERVAL: 394 MS
QTC INTERVAL: 451 MS
RBC # BLD AUTO: 4.07 MILLION/UL (ref 3.88–5.62)
RIGHT VENTRICLE ID DIMENSION: 3.4 CM
S PNEUM AG UR QL: NEGATIVE
SL CV LV EF: 60
SL CV PED ECHO LEFT VENTRICLE DIASTOLIC VOLUME (MOD BIPLANE) 2D: 62 ML
SL CV PED ECHO LEFT VENTRICLE SYSTOLIC VOLUME (MOD BIPLANE) 2D: 21 ML
SODIUM SERPL-SCNC: 136 MMOL/L (ref 135–147)
T WAVE AXIS: 81 DEGREES
TR MAX PG: 24 MMHG
TR PEAK VELOCITY: 2.5 M/S
TRICUSPID VALVE PEAK REGURGITATION VELOCITY: 2.45 M/S
VENTRICULAR RATE: 79 BPM
WBC # BLD AUTO: 13.99 THOUSAND/UL (ref 4.31–10.16)
Z-SCORE OF INTERVENTRICULAR SEPTUM IN END DIASTOLE: 2.8
Z-SCORE OF LEFT VENTRICULAR DIMENSION IN END DIASTOLE: -4.38
Z-SCORE OF LEFT VENTRICULAR DIMENSION IN END SYSTOLE: -3.24
Z-SCORE OF LEFT VENTRICULAR POSTERIOR WALL IN END DIASTOLE: 1.23

## 2022-04-19 PROCEDURE — 97163 PT EVAL HIGH COMPLEX 45 MIN: CPT

## 2022-04-19 PROCEDURE — 99222 1ST HOSP IP/OBS MODERATE 55: CPT | Performed by: NURSE PRACTITIONER

## 2022-04-19 PROCEDURE — 93970 EXTREMITY STUDY: CPT | Performed by: SURGERY

## 2022-04-19 PROCEDURE — 84145 PROCALCITONIN (PCT): CPT | Performed by: NURSE PRACTITIONER

## 2022-04-19 PROCEDURE — 74018 RADEX ABDOMEN 1 VIEW: CPT

## 2022-04-19 PROCEDURE — 80053 COMPREHEN METABOLIC PANEL: CPT | Performed by: NURSE PRACTITIONER

## 2022-04-19 PROCEDURE — 99233 SBSQ HOSP IP/OBS HIGH 50: CPT

## 2022-04-19 PROCEDURE — 93970 EXTREMITY STUDY: CPT

## 2022-04-19 PROCEDURE — 85730 THROMBOPLASTIN TIME PARTIAL: CPT | Performed by: PHYSICIAN ASSISTANT

## 2022-04-19 PROCEDURE — 97166 OT EVAL MOD COMPLEX 45 MIN: CPT

## 2022-04-19 PROCEDURE — 93306 TTE W/DOPPLER COMPLETE: CPT

## 2022-04-19 PROCEDURE — 83735 ASSAY OF MAGNESIUM: CPT | Performed by: NURSE PRACTITIONER

## 2022-04-19 PROCEDURE — 93306 TTE W/DOPPLER COMPLETE: CPT | Performed by: INTERNAL MEDICINE

## 2022-04-19 PROCEDURE — 93010 ELECTROCARDIOGRAM REPORT: CPT | Performed by: INTERNAL MEDICINE

## 2022-04-19 PROCEDURE — 85025 COMPLETE CBC W/AUTO DIFF WBC: CPT | Performed by: NURSE PRACTITIONER

## 2022-04-19 PROCEDURE — A9503 TC99M MEDRONATE: HCPCS

## 2022-04-19 PROCEDURE — 78306 BONE IMAGING WHOLE BODY: CPT

## 2022-04-19 PROCEDURE — 85730 THROMBOPLASTIN TIME PARTIAL: CPT | Performed by: INTERNAL MEDICINE

## 2022-04-19 PROCEDURE — G1004 CDSM NDSC: HCPCS

## 2022-04-19 PROCEDURE — 84100 ASSAY OF PHOSPHORUS: CPT | Performed by: NURSE PRACTITIONER

## 2022-04-19 RX ORDER — POLYETHYLENE GLYCOL 3350 17 G/17G
17 POWDER, FOR SOLUTION ORAL DAILY PRN
Status: DISCONTINUED | OUTPATIENT
Start: 2022-04-20 | End: 2022-04-21 | Stop reason: HOSPADM

## 2022-04-19 RX ADMIN — HEPARIN SODIUM 15 UNITS/KG/HR: 10000 INJECTION, SOLUTION INTRAVENOUS at 10:05

## 2022-04-19 RX ADMIN — DILTIAZEM HYDROCHLORIDE 240 MG: 240 CAPSULE, EXTENDED RELEASE ORAL at 10:00

## 2022-04-19 RX ADMIN — CHOLECALCIFEROL TAB 10 MCG (400 UNIT) 800 UNITS: 10 TAB at 10:00

## 2022-04-19 RX ADMIN — HEPARIN SODIUM 3200 UNITS: 1000 INJECTION, SOLUTION INTRAVENOUS; SUBCUTANEOUS at 12:03

## 2022-04-19 RX ADMIN — OSELTAMIVIR PHOSPHATE 30 MG: 30 CAPSULE ORAL at 10:00

## 2022-04-19 RX ADMIN — CEFEPIME HYDROCHLORIDE 1000 MG: 1 INJECTION, SOLUTION INTRAVENOUS at 10:37

## 2022-04-19 RX ADMIN — PRAVASTATIN SODIUM 20 MG: 20 TABLET ORAL at 18:23

## 2022-04-19 RX ADMIN — OSELTAMIVIR PHOSPHATE 30 MG: 30 CAPSULE ORAL at 21:10

## 2022-04-19 RX ADMIN — OMEGA-3 FATTY ACIDS CAP 1000 MG 2000 MG: 1000 CAP at 10:00

## 2022-04-19 RX ADMIN — OXYCODONE HYDROCHLORIDE AND ACETAMINOPHEN 500 MG: 500 TABLET ORAL at 10:00

## 2022-04-19 NOTE — ASSESSMENT & PLAN NOTE
· CT chest, abdomen and pelvis shows:  · Fractures of the left 10th and 11th ribs possible pathologic fracture- will obtain bone scan  · Scattered renal cyst 1 of which is mildly hyperdense measuring 1 1 cm- will obtain renal ultrasound  · Mild small bowel dilatation without obvious of transition likely related to ileus follow-up may be helpful if symptoms should persist   · Admitting provider discussed case with with surgery on-call - as the patient has no symptoms of nausea, vomiting at this time is okay for conservative management at this time      · KUB official read pending   · If worsening symptoms will need to consult surgery

## 2022-04-19 NOTE — PLAN OF CARE
Problem: RESPIRATORY - ADULT  Goal: Achieves optimal ventilation and oxygenation  Description: INTERVENTIONS:  - Assess for changes in respiratory status  - Assess for changes in mentation and behavior  - Position to facilitate oxygenation and minimize respiratory effort  - Oxygen administered by appropriate delivery if ordered  - Initiate smoking cessation education as indicated  - Encourage broncho-pulmonary hygiene including cough, deep breathe, Incentive Spirometry  - Assess the need for suctioning and aspirate as needed  - Assess and instruct to report SOB or any respiratory difficulty  - Respiratory Therapy support as indicated  Outcome: Progressing     Problem: MUSCULOSKELETAL - ADULT  Goal: Maintain or return mobility to safest level of function  Description: INTERVENTIONS:  - Assess patient's ability to carry out ADLs; assess patient's baseline for ADL function and identify physical deficits which impact ability to perform ADLs (bathing, care of mouth/teeth, toileting, grooming, dressing, etc )  - Assess/evaluate cause of self-care deficits   - Assess range of motion  - Assess patient's mobility  - Assess patient's need for assistive devices and provide as appropriate  - Encourage maximum independence but intervene and supervise when necessary  - Involve family in performance of ADLs  - Assess for home care needs following discharge   - Consider OT consult to assist with ADL evaluation and planning for discharge  - Provide patient education as appropriate  Outcome: Progressing

## 2022-04-19 NOTE — PROGRESS NOTES
[address]  Progress Note - Tyrese Ortega 1940, 78 y o  male MRN: [id number]  Unit/Bed#: ICU 03-01 Encounter: [id number]  Primary Care Provider: Ben Coelho DO   Date and time admitted to hospital: 4/18/2022 10:04 AM    * Pulmonary embolus [facility])  Assessment & Plan  · Patient has history of right lower extremity DVT  · CT C/A/P obtained in the ED revealing large pulmonary embolism in the right lower lobe with associated infiltrate  Some right heart strain could be present at the RV LV ratio being approximately 1 although judged with standard CT imaging  Possible DVT in the left femoral vein  · This is unprovoked  Concerning for possible malignancy underlying as CT chest, abdomen and pelvis with contrast shows fractures of the left 10th and 11th ribs suggesting possible pathologic fracture  · Hematology/oncology consult pending   · Continue heparin drip started in the ED  · Echocardiogram final read pending  · Continue supportive care  · Likely transition to Eliquis or apixaban pending Hematology evaluation    Sepsis [facility])  Assessment & Plan  · The patient meets SIRS criteria with tachypnea, leukocytosis, and fever with underlying infection of influenza A    · Unlikely underlying bacterial pneumonia given negative procalcitonin, strep/legionella urine antigens   · Received IV cefepime x1 dose in the ED  Azithromycin added x1 day  Will discontinue antibiotics at this time, given this is unlikely bacterial pneumonia  · Continue treatment with Tamiflu  · Leukocytosis improving, continue to trend a m  CBC    Influenza A  Assessment & Plan  · With acute respiratory failure hypoxia; SpO2 of 88% on room air  · Continue Tamiflu 30 mg b i d  2/5 days  · Supportive care  · Respiratory protocol  · Titrated off of oxygen as tolerated  PATRICK (acute kidney injury) ([facility] [address] )  Assessment & Plan  · POA  Presented with creatinine of 1 62    No baseline creatinine; however, previous level- 1 24 mg/dL  · Patient's preadmission lisinopril and HCTZ on hold in the setting of PATRICK  · Renal ultrasound: Small echogenic left kidney nodule  May be a complicated cyst   If the patient is able, dedicated CT renal protocol or MR is recommended  Otherwise short interval follow-up can be performed  · Check PVR  · Continue gentle IV fluid  · Continue to monitor renal function, avoid hypotension and nephrotoxins    Abnormal CT scan  Assessment & Plan  · CT chest, abdomen and pelvis shows:  · Fractures of the left 10th and 11th ribs possible pathologic fracture- will obtain bone scan  · Scattered renal cyst 1 of which is mildly hyperdense measuring 1 1 cm- will obtain renal ultrasound  · Mild small bowel dilatation without obvious of transition likely related to ileus follow-up may be helpful if symptoms should persist   · Admitting provider discussed case with with surgery on-call - as the patient has no symptoms of nausea, vomiting at this time is okay for conservative management at this time  · KUB official read pending   · If worsening symptoms will need to consult surgery     Essential hypertension  Assessment & Plan  · Blood pressure noted to be elevated  · Continue to hold off on lisinopril and hydrochlorothiazide at this time due to PATRICK  · Continue with diltiazem  · p r n  Hydralazine to help with blood pressure control        VTE Pharmacologic Prophylaxis: VTE Score: 4 Moderate Risk (Score 3-4) - Pharmacological DVT Prophylaxis Ordered: heparin drip  Patient Centered Rounds: I performed bedside rounds with nursing staff today  Discussions with Specialists or Other Care Team Provider:  Case Management, nursing    Education and Discussions with Family / Patient: Attempted to update  (son) via phone  Unable to contact  Time Spent for Care: 30 minutes  More than 50% of total time spent on counseling and coordination of care as described above      Current Length of Stay: 1 day(s)  Current Patient Status: Inpatient   Certification Statement: The patient will continue to require additional inpatient hospital stay due to Unprovoked pulmonary embolism on heparin drip, PATRICK, influenza positive  Discharge Plan: Anticipate discharge in 48-72 hrs to discharge location to be determined pending rehab evaluations  Code Status: Level 1 - Full Code    Subjective:   Patient seen and examined resting comfortably in bedside chair eating lunch, does not appear to be in distress  No acute events overnight  Patient is very eager to go home  Discussed with patient again the concerning findings on his CT scan is and recommended that he remain inpatient for a Hematology/Oncology consultation, for which he was agreeable to  Patient verbalizes understanding of his diagnoses, and offers no questions or complaints  Objective:     Vitals:   Temp (24hrs), Av 2 °F (36 8 °C), Min:97 9 °F (36 6 °C), Max:98 5 °F (36 9 °C)    Temp:  [97 9 °F (36 6 °C)-98 5 °F (36 9 °C)] 97 9 °F (36 6 °C)  HR:  [55-95] 85  Resp:  [20-39] 25  BP: ()/(53-91) 150/66  SpO2:  [81 %-98 %] 92 %  Body mass index is 27 67 kg/m²  Input and Output Summary (last 24 hours): Intake/Output Summary (Last 24 hours) at 2022 1244  Last data filed at 2022 1100  Gross per 24 hour   Intake 888 36 ml   Output 942 ml   Net -53 64 ml       Physical Exam:   Physical Exam  Vitals and nursing note reviewed  Constitutional:       General: He is not in acute distress  Appearance: He is normal weight  He is not toxic-appearing  HENT:      Head: Normocephalic and atraumatic  Mouth/Throat:      Mouth: Mucous membranes are moist    Eyes:      Conjunctiva/sclera: Conjunctivae normal    Cardiovascular:      Rate and Rhythm: Normal rate and regular rhythm  Pulses: Normal pulses  Heart sounds: Normal heart sounds  Pulmonary:      Effort: Pulmonary effort is normal  No respiratory distress  Breath sounds:  No wheezing, rhonchi or rales  Comments: Coarse breath sounds throughout  Abdominal:      General: Bowel sounds are normal  There is no distension  Palpations: Abdomen is soft  Tenderness: There is no abdominal tenderness  Musculoskeletal:      Cervical back: Neck supple  Right lower leg: No edema  Left lower leg: No edema  Skin:     General: Skin is warm and dry  Neurological:      Mental Status: He is alert and oriented to person, place, and time  Mental status is at baseline  Cranial Nerves: No cranial nerve deficit  Sensory: No sensory deficit  Motor: No weakness  Coordination: Coordination normal    Psychiatric:         Mood and Affect: Mood normal          Behavior: Behavior normal          Thought Content: Thought content normal           Additional Data:     Labs:  Results from last 7 days   Lab Units 04/19/22  0455   WBC Thousand/uL 13 99*   HEMOGLOBIN g/dL 13 6   HEMATOCRIT % 41 7   PLATELETS Thousands/uL 162   NEUTROS PCT % 78*   LYMPHS PCT % 8*   MONOS PCT % 12   EOS PCT % 1     Results from last 7 days   Lab Units 04/19/22  0455   SODIUM mmol/L 136   POTASSIUM mmol/L 3 6   CHLORIDE mmol/L 102   CO2 mmol/L 26   BUN mg/dL 24   CREATININE mg/dL 1 40*   ANION GAP mmol/L 8   CALCIUM mg/dL 8 8   ALBUMIN g/dL 3 2*   TOTAL BILIRUBIN mg/dL 0 57   ALK PHOS U/L 38   ALT U/L 17   AST U/L 14   GLUCOSE RANDOM mg/dL 120     Results from last 7 days   Lab Units 04/18/22  1315   INR  1 21*             Results from last 7 days   Lab Units 04/19/22  0455 04/18/22  1027   LACTIC ACID mmol/L  --  1 1   PROCALCITONIN ng/ml 0 13 0 10       Lines/Drains:  Invasive Devices  Report    Peripheral Intravenous Line            Peripheral IV 04/18/22 Right Antecubital 1 day    Peripheral IV 04/18/22 Dorsal (posterior); Right;Distal Forearm <1 day                      Imaging: Reviewed radiology reports from this admission including: CT chest/abdomen/pelvis    Recent Cultures (last 7 days): Results from last 7 days   Lab Units 04/18/22  1541 04/18/22  1042 04/18/22  1027   BLOOD CULTURE   --  Received in Microbiology Lab  Culture in Progress  Received in Microbiology Lab  Culture in Progress  LEGIONELLA URINARY ANTIGEN  Negative  --   --        Last 24 Hours Medication List:   Current Facility-Administered Medications   Medication Dose Route Frequency Provider Last Rate    acetaminophen  650 mg Oral Q6H PRN Gladis Plane, CRNP      albuterol  2 5 mg Nebulization Q4H PRN Sg Fling, DO      ascorbic acid  500 mg Oral Daily Gladis Plane, CRNP      azithromycin  500 mg Intravenous Q24H Gladis Plane, CRNP 500 mg (04/18/22 1545)    cefepime  1,000 mg Intravenous Q12H Gladis Plane, CRNP Stopped (04/19/22 1100)    cholecalciferol  800 Units Oral Daily Gladis Plane, CRNP      diltiazem  240 mg Oral Daily Gladis Plane, CRNP      fish oil  2,000 mg Oral Daily Gladis Plane, CRNP      heparin (porcine)  3-30 Units/kg/hr (Order-Specific) Intravenous Titrated Gladis Plane, CRNP 17 Units/kg/hr (04/19/22 1205)    heparin (porcine)  3,200 Units Intravenous Q1H PRN Gladis Plane, CRNP      heparin (porcine)  6,400 Units Intravenous Q1H PRN Gladis Plane, CRNP      hydrALAZINE  10 mg Intravenous Q6H PRN Gladis Plane, CRNP      oseltamivir  30 mg Oral Q12H [address] Gladis Plane, CRNP      pravastatin  20 mg Oral Daily With [address], CRNP          Today, Patient Was Seen By: Gualberto Lundberg PA-C    **Please Note: This note may have been constructed using a voice recognition system  **

## 2022-04-19 NOTE — PLAN OF CARE
Problem: Potential for Falls  Goal: Patient will remain free of falls  Description: INTERVENTIONS:  - Educate patient/family on patient safety including physical limitations  - Instruct patient to call for assistance with activity   - Consult OT/PT to assist with strengthening/mobility   - Keep Call bell within reach  - Keep bed low and locked with side rails adjusted as appropriate  - Keep care items and personal belongings within reach  - Initiate and maintain comfort rounds  - Make Fall Risk Sign visible to staff  - Offer Toileting every 2 Hours, in advance of need  - Initiate/Maintain bed alarm  - Obtain necessary fall risk management equipment: non skid footwear, yellow bracelet  - Apply yellow socks and bracelet for high fall risk patients  - Consider moving patient to room near nurses station  Outcome: Progressing     Problem: RESPIRATORY - ADULT  Goal: Achieves optimal ventilation and oxygenation  Description: INTERVENTIONS:  - Assess for changes in respiratory status  - Assess for changes in mentation and behavior  - Position to facilitate oxygenation and minimize respiratory effort  - Oxygen administered by appropriate delivery if ordered  - Initiate smoking cessation education as indicated  - Encourage broncho-pulmonary hygiene including cough, deep breathe, Incentive Spirometry  - Assess the need for suctioning and aspirate as needed  - Assess and instruct to report SOB or any respiratory difficulty  - Respiratory Therapy support as indicated  Outcome: Progressing     Problem: SKIN/TISSUE INTEGRITY - ADULT  Goal: Skin Integrity remains intact(Skin Breakdown Prevention)  Description: Assess:  -Perform Flo assessment  -Clean and moisturize skin   -Inspect skin when repositioning, toileting, and assisting with ADLS  -Assess under medical devices   -Assess extremities for adequate circulation and sensation     Bed Management:  -Have minimal linens on bed & keep smooth, unwrinkled  -Change linens as needed when moist or perspiring  -Avoid sitting or lying in one position while in bed  -Keep HOB at 30 degrees     Toileting:  -Offer bedside commode  -Assess for incontinence  -Use incontinent care products after each incontinent episode     Activity:  -Mobilize patient   -Encourage activity and walks on unit  -Encourage or provide ROM exercises   -Turn and reposition patient   -Use appropriate equipment to lift or move patient in bed  -Instruct/ Assist with weight shifting   -Consider limitation of chair time     Skin Care:  -Avoid use of baby powder, tape, friction and shearing, hot water or constrictive clothing  -Relieve pressure over bony prominences   -Do not massage red bony areas    Next Steps:  -Teach patient strategies to minimize risks   -Consider consults to  interdisciplinary teams  Outcome: Progressing     Problem: MUSCULOSKELETAL - ADULT  Goal: Maintain or return mobility to safest level of function  Description: INTERVENTIONS:  - Assess patient's ability to carry out ADLs; assess patient's baseline for ADL function and identify physical deficits which impact ability to perform ADLs (bathing, care of mouth/teeth, toileting, grooming, dressing, etc )  - Assess/evaluate cause of self-care deficits   - Assess range of motion  - Assess patient's mobility  - Assess patient's need for assistive devices and provide as appropriate  - Encourage maximum independence but intervene and supervise when necessary  - Involve family in performance of ADLs  - Assess for home care needs following discharge   - Consider OT consult to assist with ADL evaluation and planning for discharge  - Provide patient education as appropriate  Outcome: Progressing     Problem: PAIN - ADULT  Goal: Verbalizes/displays adequate comfort level or baseline comfort level  Description: Interventions:  - Encourage patient to monitor pain and request assistance  - Assess pain using appropriate pain scale  - Administer analgesics based on type and severity of pain and evaluate response  - Implement non-pharmacological measures as appropriate and evaluate response  - Consider cultural and social influences on pain and pain management  - Notify physician/advanced practitioner if interventions unsuccessful or patient reports new pain  Outcome: Progressing     Problem: INFECTION - ADULT  Goal: Absence or prevention of progression during hospitalization  Description: INTERVENTIONS:  - Assess and monitor for signs and symptoms of infection  - Monitor lab/diagnostic results  - Monitor all insertion sites, i e  indwelling lines, tubes, and drains  - Monitor endotracheal if appropriate and nasal secretions for changes in amount and color  - [place] appropriate cooling/warming therapies per order  - Administer medications as ordered  - Instruct and encourage patient and family to use good hand hygiene technique  - Identify and instruct in appropriate isolation precautions for identified infection/condition  Outcome: Progressing     Problem: SAFETY ADULT  Goal: Patient will remain free of falls  Description: INTERVENTIONS:  - Educate patient/family on patient safety including physical limitations  - Instruct patient to call for assistance with activity   - Consult OT/PT to assist with strengthening/mobility   - Keep Call bell within reach  - Keep bed low and locked with side rails adjusted as appropriate  - Keep care items and personal belongings within reach  - Initiate and maintain comfort rounds  - Make Fall Risk Sign visible to staff  - Offer Toileting every 2 Hours, in advance of need  - Initiate/Maintain bed alarm  - Obtain necessary fall risk management equipment: non skid footwear, yellow bracelet  - Apply yellow socks and bracelet for high fall risk patients  - Consider moving patient to room near nurses station  Outcome: Progressing  Goal: Maintain or return to baseline ADL function  Description: INTERVENTIONS:  - Educate patient/family on patient safety including physical limitations  - Instruct patient to call for assistance with activity   - Consult OT/PT to assist with strengthening/mobility   - Keep Call bell within reach  - Keep bed low and locked with side rails adjusted as appropriate  - Keep care items and personal belongings within reach  - Initiate and maintain comfort rounds  - Make Fall Risk Sign visible to staff  - Offer Toileting every 2 Hours, in advance of need  - Initiate/Maintain bed alarm  - Obtain necessary fall risk management equipment: yellow bracelet/yellow socks  - Apply yellow socks and bracelet for high fall risk patients  - Consider moving patient to room near nurses station  Outcome: Progressing  Goal: Maintains/Returns to pre admission functional level  Description: INTERVENTIONS:  - Perform BMAT or MOVE assessment daily    - Set and communicate daily mobility goal to care team and patient/family/caregiver     - Collaborate with rehabilitation services on mobility goals if consulted  - Perform Range of Motion   - Reposition patient   - Dangle patient   - Stand patient  - Ambulate patient   - Out of bed to chair   - Out of bed for meals   - Out of bed for toileting  - Record patient progress and toleration of activity level   Outcome: Progressing     Problem: DISCHARGE PLANNING  Goal: Discharge to home or other facility with appropriate resources  Description: INTERVENTIONS:  - Identify barriers to discharge w/patient and caregiver  - Arrange for needed discharge resources and transportation as appropriate  - Identify discharge learning needs (meds, wound care, etc )  - Arrange for interpretive services to assist at discharge as needed  - Refer to Case Management Department for coordinating discharge planning if the patient needs post-hospital services based on physician/advanced practitioner order or complex needs related to functional status, cognitive ability, or social support system  Outcome: Progressing     Problem: Knowledge Deficit  Goal: Patient/family/caregiver demonstrates understanding of disease process, treatment plan, medications, and discharge instructions  Description: Complete learning assessment and assess knowledge base  Interventions:  - Provide teaching at level of understanding  - Provide teaching via preferred learning methods  Outcome: Progressing     Problem: MOBILITY - ADULT  Goal: Maintain or return to baseline ADL function  Description: INTERVENTIONS:  - Educate patient/family on patient safety including physical limitations  - Instruct patient to call for assistance with activity   - Consult OT/PT to assist with strengthening/mobility   - Keep Call bell within reach  - Keep bed low and locked with side rails adjusted as appropriate  - Keep care items and personal belongings within reach  - Initiate and maintain comfort rounds  - Make Fall Risk Sign visible to staff  - Offer Toileting every 2 Hours, in advance of need  - Initiate/Maintain bed alarm  - Obtain necessary fall risk management equipment: yellow bracelet/yellow socks  - Apply yellow socks and bracelet for high fall risk patients  - Consider moving patient to room near nurses station  Outcome: Progressing  Goal: Maintains/Returns to pre admission functional level  Description: INTERVENTIONS:  - Perform BMAT or MOVE assessment daily    - Set and communicate daily mobility goal to care team and patient/family/caregiver     - Collaborate with rehabilitation services on mobility goals if consulted  - Perform Range of Motion   - Reposition patient   - Dangle patient   - Stand patient   - Ambulate patient   - Out of bed to chair  - Out of bed for meals   - Out of bed for toileting  - Record patient progress and toleration of activity level   Outcome: Progressing     Problem: Prexisting or High Potential for Compromised Skin Integrity  Goal: Skin integrity is maintained or improved  Description: INTERVENTIONS:  - Identify patients at risk for skin breakdown  - Assess and monitor skin integrity  - Assess and monitor nutrition and hydration status  - Monitor labs   - Assess for incontinence   - Turn and reposition patient  - Assist with mobility/ambulation  - Relieve pressure over bony prominences  - Avoid friction and shearing  - Provide appropriate hygiene as needed including keeping skin clean and dry  - Evaluate need for skin moisturizer/barrier cream  - Collaborate with interdisciplinary team   - Patient/family teaching  - Consider wound care consult   Outcome: Progressing

## 2022-04-19 NOTE — UTILIZATION REVIEW
Initial Clinical Review    Admission: Date/Time/Statement:   Admission Orders (From admission, onward)     Ordered        04/18/22 1259  Inpatient Admission  Once                      Orders Placed This Encounter   Procedures    Inpatient Admission     Standing Status:   Standing     Number of Occurrences:   1     Order Specific Question:   Level of Care     Answer:   Level 2 Stepdown / HOT [14]     Order Specific Question:   Estimated length of stay     Answer:   More than 2 Midnights     Order Specific Question:   Certification     Answer:   I certify that inpatient services are medically necessary for this patient for a duration of greater than two midnights  See H&P and MD Progress Notes for additional information about the patient's course of treatment  ED Arrival Information     Expected Arrival Acuity    4/18/2022 4/18/2022 09:31 Emergent         Means of arrival Escorted by Service Admission type    [facility] Emergency         Arrival complaint    Fever, cough, hypoxia         Chief Complaint   Patient presents with    Cough    Shortness of Breath     with exertion    Weakness - Generalized       Initial Presentation: 78 y o  male : W/PMHX: HTN: c/w home Diltiazem hold hctz and lisinopril d/t patrick  Prn IV hydralazine,  DVT rt  Leg, to ED from HOME, admitted as INPATIENT Med/surge due to Pulmonary embolus, Sepsis, PATRICK POA,  Influenza A  Family Pt's lives with is sick with pneumonia  Presented with generalized weakness and sob, worsening QUINN  Pt  Describes feeling "sick" since Monday, and getting progressively worse  +Rhinorrhea, sore throat, and dyspnea that prevented him from sleeping last night  Exam:  SIRS criteria met ,with fever, Hypertensive, Tachypnea, Hypoxic 02 sat 88% on arrival, with underlying influenza A,  Breath sounds with wheezing and decreased throughout  Skin color is pale, mental status is at baseline  A&O x3    ED work up reveals: + influenza A, CT C/A/P w/IV contrast reveals Large PE, possible DVT in left femoral Vein, FX  Left 10th and 11th ribs one of which demonstrates possible lytic component  Suggesting possible pathologic fx , CXR developing left perihilar infiltrate possible PNA,  leukocytosis, elevated bun/cr  , Plan:Heparin drip initiated in ED, continuation with trending INR and titration of drip, transition to Eliquis or apixaban pending Hematology eval , consult hematology/oncology, Tamiflu x 5D, Titrate 02 as needed, IVF hydration, PRN IV hydralazine prn,   IV ABX, r/o bacterial PNA, strep and legionella pending, procal pending, trend serial labs with repletion a needed  Nuclear bone scan pending, Renal US pending, avoid nephrotoxic agents and hypotension,  CK PVR,   ECHO, dvt px heparin gtt, scd      Date: 4/19/22   Day 2: 02 sat 92 % on RA remains tachyonic, afebrile,  breath sounds coarse throughout  A&O mental status at baseline  Sepsis: unlikely bacterial PNA d/t -procal , strep/legionella urine antigens, d/c iv abx,   Leukocytosis downward trending PLAN: c/w heparin drip and titrate as needed, PATRICK improving cr downward trending, renal u/s sm  Echogenic left kidney nodule  Maybe complicated cyst  KUB: no obstruction  Trend serial labs and repletion as needed, trend WBC and fever curve  C/w TAMIFLU D2/5, c/w IVF hydration, SCDs,     Intake/Output Summary (Last 24 hours) at 4/19/2022 1244  Last data filed at 4/19/2022 1100      Gross per 24 hour   Intake 888 36 ml   Output 942 ml   Net -53 64 ml     Hematology/Oncology Consult: PE:  Infection/influenza likely contributing factor  Unproved DVT approx 10yrs  , with treated with [facility],  Additional work-up in process and will  Be ordered to r/o malignancy as reason for hypercoagulability especially in light of suspicious rib fx  Throboiss panel was drawn and most pending  Several test may not be accurate as they were drawn after acute thrombosis    Beta 2 glycoprotein and anticardiolipin antibodies, came back in the negative range ruling out antiphosolipid syndrome  Awaiting Factor V and prothrombin gene mutation to r/o inherited etiology  Patient is currently on a heparin drip that can be transitioned to DOAC at discharge  Indefinite anticoagulation would be advised since this is his 2nd clotting event Fx Rib: 10-11 rib, demonstrates possible lytic component  s/p fall 5-6 weeks ago, and fell on left side  Could be reason for fx  Lab w/o to r/o multiple myeloma waldo in light of renal dysfunction  PSA pending waldo d/t urinary issues and BPH  Macrocytosis TSH wnl, check R13 folic acid, leukocytosis likely reactive in nature d/t infection/influenza, inflammatory process  C/w trending labs  Mild sm  Bowel dilatation: KUB showed mildly prominent air filled small bowel loops which may represent mild ileus  Surgery team recommending conservative management/monitoring d/t asymptomatic  No BM since Sunday  Prn MirLax ordered and CEA pending            ED Triage Vitals   Temperature Pulse Respirations Blood Pressure SpO2   04/18/22 1011 04/18/22 1011 04/18/22 1011 04/18/22 1020 04/18/22 1011   (!) 100 6 °F (38 1 °C) 77 (!) 24 159/75 (!) 88 %      Temp Source Heart Rate Source Patient Position - Orthostatic VS BP Location FiO2 (%)   04/18/22 1011 04/18/22 1011 04/18/22 1230 04/18/22 1230 --   Tympanic Monitor Sitting Left arm       Pain Score       04/18/22 1011       No Pain          Wt Readings from Last 1 Encounters:   04/19/22 82 6 kg (182 lb)     Additional Vital Signs:   Date/Time Temp Pulse Resp BP MAP (mmHg) SpO2 Calculated FIO2 (%) - Nasal Cannula Nasal Cannula O2 Flow Rate (L/min) O2 Device Patient Position - Orthostatic VS   04/19/22 1400 -- 73 30 Abnormal  162/73 105 92 % -- -- None (Room air) Lying   04/19/22 1200 -- 72 26 Abnormal  150/69 99 -- -- -- -- Lying   04/19/22 1100 97 9 °F (36 6 °C) 85 25 Abnormal  150/66 95 92 % -- -- None (Room air) Lying   04/19/22 1000 -- 78 28 Abnormal  155/66 96 93 % -- -- None (Room air) Lying   04/19/22 0900 -- 79 29 Abnormal  146/67 97 94 % -- -- None (Room air) Lying   04/19/22 0830 -- 78 -- 153/71 -- -- -- -- -- --   04/19/22 0700 98 3 °F (36 8 °C) -- -- -- -- -- -- -- -- --   04/19/22 0600 -- 78 27 Abnormal  153/71 102 93 % -- -- -- --   04/19/22 0500 -- 71 21 132/60 87 94 % -- -- -- --   04/19/22 0400 -- 74 26 Abnormal  158/74 107 95 % 32 3 L/min Nasal cannula Lying   04/19/22 0300 -- 55 21 136/60 86 96 % -- -- -- --   04/19/22 0200 -- 82 32 Abnormal  162/91 121 97 % -- -- -- --   04/19/22 0100 -- 63 24 Abnormal  150/65 94 98 % -- -- -- --   04/19/22 0000 98 2 °F (36 8 °C) 62 23 Abnormal  99/53 74 97 % -- -- -- --   04/18/22 2300 -- 70 -- 134/60 86 97 % -- -- -- --   04/18/22 2200 -- 76 31 Abnormal  137/62 89 95 % -- -- -- --   04/18/22 2100 -- 69 27 Abnormal  136/63 90 96 % -- -- -- --   04/18/22 2000 -- 76 39 Abnormal  143/65 94 94 % -- -- -- --   04/18/22 1937 98 5 °F (36 9 °C) -- -- -- -- -- -- -- -- --   04/18/22 1900 -- 71 26 Abnormal  113/55 79 95 % -- -- -- --   04/18/22 1700 -- 88 34 Abnormal  121/54 78 81 % Abnormal  -- -- -- --   04/18/22 1605 -- -- -- -- -- 97 % 32 3 L/min Nasal cannula --   04/18/22 1600 -- 95 29 Abnormal  148/63 90 96 % -- -- -- --   04/18/22 1530 -- -- -- 177/79 Abnormal  -- -- -- -- -- --   04/18/22 1437 --  84 23 Abnormal  177/77 Abnormal  111 97 % 32 3 L/min Nasal cannula Lying   04/18/22 1400 98 °F (36 7 °C) 79 20 148/67 96 97 % 32 3 L/min Nasal cannula Sitting   04/18/22 1300 -- 80 20 155/70 101 97 % 32 3 L/min Nasal cannula Lying   04/18/22 1230 -- 80 20 143/65 93 96 % 32 3 L/min Nasal cannula Sitting   04/18/22 1200 -- 79 -- 135/64 92 97 % -- -- -- --   04/18/22 1047 -- -- -- -- -- -- 32 3 L/min Nasal cannula --   04/18/22 1020 -- -- -- 159/75 -- -- -- -- -- --       Pertinent Labs/Diagnostic Test Results:   4/19/22 ECHO: EF 60%  4/18/22 EKG: Sinus rhythm  Premature atrial complexes  Otherwise normal ECG  When compared with ECG of 18-APR-2022 15:10,  No significant change was found    XR abdomen 1 view kub   Final Result by Neo Gaming MD (04/19 8023)    IMPRESSION:      No obstruction  Mildly prominent air-filled small bowel loops may represent mild ileus  Workstation performed: [id number]         US kidney and bladder   Final Result by Nisha Gustafson MD (04/19 6614)      Small echogenic left kidney nodule  May be a complicated cyst   If the patient is able, dedicated CT renal protocol or MR is recommended  Otherwise short interval follow-up can be performed  Workstation performed: [id number]         CT chest abdomen pelvis w contrast   Final Result by Adrien Severin, MD (04/18 1205)      Large pulmonary embolism in the right lower lobe with associated infiltrate  Some right heart strain could be present at the RV LV ratio being approximately 1 although judged with standard CT imaging  Possible DVT in the left femoral vein  No evidence of mass or adenopathy exception of one mildly prominent retroperitoneal node  Fractures of the left 10th and 11th ribs one of which demonstrates possible lytic component suggesting possible pathologic fracture  Bone scan may be useful given the presence of PE  Scattered renal cysts one of which may be mildly hyperdense measuring 1 1 cm  This is adjacent to a larger cyst  Follow-up focused ultrasound be useful  Mild small bowel dilatation without obvious of transition likely related to ileus follow-up may be helpful if symptoms should persist       This critical finding was discussed with NICOLAS Jackman at 12 noon      Workstation performed: [id number]         XR chest portable   Final Result by Kacy Ramos DO (04/18 1101)      Question developing left perihilar infiltrate, possibly pneumonia  Follow-up advised              Workstation performed: [id number]         VAS lower limb venous duplex study, complete bilateral : chronic 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  LLE no evidence of acute or chronic DVT  NM bone scan whole body    (Results Pending)     Results from last 7 days   Lab Units 04/18/22  1042   SARS-COV-2  Negative     Results from last 7 days   Lab Units 04/19/22  0455 04/18/22  1027   WBC Thousand/uL 13 99* 15 07*   HEMOGLOBIN g/dL 13 6 15 3   HEMATOCRIT % 41 7 46 6   PLATELETS Thousands/uL 162 183   NEUTROS ABS Thousands/µL 10 83* 11 90*         Results from last 7 days   Lab Units 04/19/22  0455 04/18/22  1027   SODIUM mmol/L 136 136   POTASSIUM mmol/L 3 6 3 7   CHLORIDE mmol/L 102 99   CO2 mmol/L 26 31   ANION GAP mmol/L 8 6   BUN mg/dL 24 29*   CREATININE mg/dL 1 40* 1 62*   EGFR ml/min/1 73sq m 46 38   CALCIUM mg/dL 8 8 9 3   MAGNESIUM mg/dL 2 0  --    PHOSPHORUS mg/dL 3 0  --      Results from last 7 days   Lab Units 04/19/22  0455 04/18/22  1027   AST U/L 14 13   ALT U/L 17 17   ALK PHOS U/L 38 46   TOTAL PROTEIN g/dL 6 0* 7 3   ALBUMIN g/dL 3 2* 3 8   TOTAL BILIRUBIN mg/dL 0 57 0 63         Results from last 7 days   Lab Units 04/19/22  0455 04/18/22  1027   GLUCOSE RANDOM mg/dL 120 126               Results from last 7 days   Lab Units 04/18/22  2306 04/18/22  1934 04/18/22  1517 04/18/22  1248   HS TNI 0HR ng/L 28  --   --  24   HS TNI 2HR ng/L  --   --  27  --    HSTNI D2 ng/L  --   --  3  --    HS TNI 4HR ng/L  --  32  --   --    HSTNI D4 ng/L  --  8  --   --          Results from last 7 days   Lab Units 04/19/22  1032 04/19/22  0312 04/18/22  1935 04/18/22  1315 04/18/22  1315 04/18/22  1027 04/18/22  1027   PROTIME seconds  --   --   --   --  15 2*  --  14 7*   INR   --   --   --   --  1 21*  --  1 16   PTT seconds 57* 73* 137*   < > 30   < > 32    < > = values in this interval not displayed       Results from last 7 days   Lab Units 04/18/22  2306   TSH 3RD GENERATON uIU/mL 1 944     Results from last 7 days   Lab Units 04/19/22  0455 04/18/22  1027   PROCALCITONIN ng/ml 0 13 0 10     Results from last 7 days Lab Units 04/18/22  1027   LACTIC ACID mmol/L 1 1             Results from last 7 days   Lab Units 04/18/22  1027   BNP pg/mL 130*             Results from last 7 days   Lab Units 04/18/22  1027   LIPASE u/L 23                 Results from last 7 days   Lab Units 04/18/22  1249   CLARITY UA  Clear   COLOR UA  Yellow   SPEC GRAV UA  1 015   PH UA  5 0   GLUCOSE UA mg/dl Negative   KETONES UA mg/dl Negative   BLOOD UA  Negative   PROTEIN UA mg/dl 1+*   NITRITE UA  Negative   BILIRUBIN UA  Negative   UROBILINOGEN UA E U /dl 0 2   LEUKOCYTES UA  Negative   WBC UA /hpf None Seen   RBC UA /hpf None Seen   BACTERIA UA /hpf None Seen   EPITHELIAL CELLS WET PREP /hpf None Seen     Results from last 7 days   Lab Units 04/18/22  1541 04/18/22  1042   STREP PNEUMONIAE ANTIGEN, URINE  Negative  --    LEGIONELLA URINARY ANTIGEN  Negative  --    INFLUENZA A PCR   --  Positive*   INFLUENZA B PCR   --  Negative   RSV PCR   --  Negative           Results from last 7 days   Lab Units 04/18/22  1042 04/18/22  1027   BLOOD CULTURE  Received in Microbiology Lab  Culture in Progress  Received in Microbiology Lab  Culture in Progress         ED Treatment:   Medication Administration from 04/18/2022 0911 to 04/18/2022 1434       Date/Time Order Dose Route Action     04/18/2022 1049 cefepime (MAXIPIME) IVPB (premix in dextrose) 2,000 mg 50 mL 2,000 mg Intravenous New Bag     04/18/2022 1027 sodium chloride 0 9 % bolus 1,000 mL 1,000 mL Intravenous New Bag     04/18/2022 1124 iohexol (OMNIPAQUE) 350 MG/ML injection (SINGLE-DOSE) 100 mL 100 mL Intravenous Given     04/18/2022 1324 heparin (porcine) injection 6,400 Units 6,400 Units Intravenous Given     04/18/2022 1324 heparin (porcine) 25,000 units in 0 45% NaCl 250 mL infusion (premix) 18 Units/kg/hr Intravenous New Bag        Past Medical History:   Diagnosis Date    Hx of blood clots     Hyperlipidemia     Hypertension      Present on Admission:   Essential hypertension      Admitting Diagnosis: Flu [J11 1]  Pulmonary embolus (HCC) [I26 99]  Weakness [R53 1]  Age/Sex: 78 y o  male  Admission Orders:surgical soft diet/lite meal, up with assistance, OT/PT,     Scheduled Medications:  ascorbic acid, 500 mg, Oral, Daily  cholecalciferol, 800 Units, Oral, Daily  diltiazem, 240 mg, Oral, Daily  fish oil, 2,000 mg, Oral, Daily  oseltamivir, 30 mg, Oral, Q12H EDGAR  pravastatin, 20 mg, Oral, Daily With Dinner      Continuous IV Infusions:  heparin (porcine), 3-30 Units/kg/hr (Order-Specific), Intravenous, Titrated      PRN Meds:  acetaminophen, 650 mg, Oral, Q6H PRN  albuterol, 2 5 mg, Nebulization, Q4H PRN  heparin (porcine), 3,200 Units, Intravenous, Q1H PRN 4/19 x1  heparin (porcine), 6,400 Units, Intravenous, Q1H PRN  hydrALAZINE, 10 mg, Intravenous, Q6H PRN 4/18 x1        IP CONSULT TO CASE MANAGEMENT  IP CONSULT TO HEMATOLOGY    Network Utilization Review Department  ATTENTION: Please call with any questions or concerns to [phone] and carefully listen to the prompts so that you are directed to the right person  All voicemails are confidential   Marvel Pisano all requests for admission clinical reviews, approved or denied determinations and any other requests to dedicated fax number below belonging to the campus where the patient is receiving treatment   List of dedicated fax numbers for the Facilities:  [address] DENIALS (Administrative/Medical Necessity) [phone]   [address] (Maternity/NICU/Pediatrics) [phone]   [address] [place] [address] [address] [id number] [address] [facility] [phone]   [address] [place] [address] [phone]

## 2022-04-19 NOTE — ASSESSMENT & PLAN NOTE
· Blood pressure noted to be elevated  · Continue to hold off on lisinopril and hydrochlorothiazide at this time due to PATRICK  · Continue with diltiazem  · p r n   Hydralazine to help with blood pressure control

## 2022-04-19 NOTE — ASSESSMENT & PLAN NOTE
· Patient has history of right lower extremity DVT  · CT C/A/P obtained in the ED revealing large pulmonary embolism in the right lower lobe with associated infiltrate  Some right heart strain could be present at the RV LV ratio being approximately 1 although judged with standard CT imaging  Possible DVT in the left femoral vein  · This is unprovoked  Concerning for possible malignancy underlying as CT chest, abdomen and pelvis with contrast shows fractures of the left 10th and 11th ribs suggesting possible pathologic fracture     · Hematology/oncology consult pending   · Continue heparin drip started in the ED  · Echocardiogram final read pending  · Continue supportive care  · Likely transition to Eliquis or apixaban pending Hematology evaluation

## 2022-04-19 NOTE — OCCUPATIONAL THERAPY NOTE
Occupational Therapy Evaluation      Evelyn Lew    4/19/2022    Patient Active Problem List   Diagnosis    Trochanteric bursitis of left hip    Essential hypertension    Hyperlipidemia    History of deep venous thrombosis (DVT) of distal vein of right lower extremity    Hyperglycemia    Tremor of both hands    BMI 28 0-28 9,adult    Medicare annual wellness visit, subsequent    Peripheral vascular disease with claudication ([address] )    Pulmonary embolus ([address] )    Influenza A    PATRICK (acute kidney injury) ([address] )    Abnormal CT scan    Sepsis ([facility] )       Past Medical History:   Diagnosis Date    Hx of blood clots     Hyperlipidemia     Hypertension        Past Surgical History:   Procedure Laterality Date    HERNIA REPAIR      KNEE ARTHROSCOPY Right 07/31/2015    PROSTATE SURGERY          04/19/22 1236   OT Last Visit   OT Visit Date 04/19/22   Note Type   Note type Evaluation   Additional Comments Pt agreeable to OT eval  Upon arrival pt supine in bed    Restrictions/Precautions   Weight Bearing Precautions Per Order No   Other Precautions Droplet precautions;Contact/isolation;Multiple lines;Telemetry; Fall Risk   Pain Assessment   Pain Assessment Tool 0-10   Pain Score No Pain   Home Living   Type of Home House   Home Layout Two level;Performs ADLs on one level;Stairs to enter with rails  (1 DAVID, 21 steps to main living area )   Bathroom Shower/Tub Walk-in shower   Bathroom Toilet Standard   Bathroom Equipment Grab bars in shower   [address] Walker;Cane  (no AD used at baseline )   Prior Function   Level of [place] Independent with ADLs and functional mobility   Lives With Spouse; Family  (granddtr and family)   Receives Help From Family   ADL Assistance Independent   IADLs Needs assistance  (Wife does cooking and laundry )   Falls in the last 6 months 1 to 4  (1 mechanical fall - slipped on snow)   Vocational Retired   Comments (+) driving    Lifestyle Autonomy Patient reporting being independent with ADLs,  ambulatory with no AD and lives with family in a two story house   Reciprocal Relationships Wife and family    Service to Others Retired    [address] Enjoys exercising ([email or web address])   ADL   Eating Assistance 6  Modified independent   [address] 5  [address] [facility] 4  [address] 5  [address] [facility] 4  [address]  5  Supervision/Setup   Additional Comments ADL levels based on functional performance during OT eval  Will continue to assess  Bed Mobility   Supine to Sit 5  Supervision   Additional items Assist x 1;HOB elevated; Bedrails   Sit to Supine   (DNT: pt seated OOB in recliner at end of eval )   Additional Comments Pt denied lightheaded/dizziness with transitional movements    Transfers   Sit to Stand 5  Supervision   Additional items Assist x 1; Increased time required;Verbal cues; Bedrails   Stand to Sit 5  Supervision   Additional items Assist x 1; Armrests; Increased time required;Verbal cues   Additional Comments Verbal cues provided for proper hand placemment    Functional Mobility   Functional Mobility 5  Supervision   Additional Comments Pt ambulated in room with no overt LOB or SOB  SpO2 >90% with ambulation     Additional items   (Pt ambulated with no AD)   Balance   Static Sitting Good   Dynamic Sitting Fair +   Static Standing Fair   Dynamic Standing Fair -   Activity Tolerance   Activity Tolerance Patient limited by fatigue   Medical Staff Made Aware Pt seen as a co-eval with PT due to the patient's co-morbidities & clinically unstable presentation   Nurse Made Aware RN made aware of outcomes    RUE Assessment   RUE Assessment WFL  (grossly 4-/5 MMT)   LUE Assessment   LUE Assessment WFL  (grossly 4-/5 MMT)   Hand Function   Gross Motor Coordination Functional   Fine Motor Coordination Functional   Sensation   Light Touch No apparent deficits   Vision-Basic Assessment   Current Vision Wears glasses only for reading   Cognition   Overall Cognitive Status [facility]   Arousal/Participation Alert; Responsive; Cooperative   Attention Within functional limits   Orientation Level Oriented X4   Memory Within functional limits   Following Commands Follows all commands and directions without difficulty   Assessment   Limitation Decreased ADL status; Decreased UE strength;Decreased endurance;Decreased self-care trans;Decreased high-level ADLs   Prognosis Good   Assessment Patient is a 78 y o  male seen for OT evaluation s/p admit to [facility] on 4/18/2022 w/Pulmonary embolus ([facility] )  Commorbidities affecting patient's functional performance at time of assessment include: PATRICK, HTN, influenza A and sepsis  Orders placed for OT evaluation and treatment and up with assistance  Performed at least two patient identifiers during session including name and wristband  Prior to admission, Patient reporting being independent with ADLs,  ambulatory with no AD and lives with family in a two story house  Personal factors affecting patient at time of initial evaluation include: steps to enter, difficulty performing ADLs and difficulty performing IADLs  Upon evaluation, patient requires supervision assist for UB ADLs, minimal  assist for LB ADLs, transfers and functional ambulation in room and bathroom with supervision assist, with the use of no AD  Patient is oriented x 4 and presents with ability to recognize a problem, define a problem, identify alternative plan, select a plan, organize steps in a plan, implement plan and evaluate outcome (problem solving)    Occupational performance is affected by the following deficits: decreased muscle strength, dynamic sit/ stand balance deficit with poor standing tolerance time for self care and functional mobility, decreased activity tolerance and delayed righting and equilibrium reactions  Based on the mentioned OT evaluation outcomes, functional performance deficits, and assessment findings, pt has been identified as a moderate complexity evaluation  Patient to benefit from continued Occupational Therapy treatment while in the hospital to address deficits as defined above and maximize level of functional independence with ADLs and functional mobility  Occupational Performance areas to address include: grooming , bathing/ shower, dressing, toilet hygiene, transfer to all surfaces, functional ambulation, medication routine/ management, IADLS: Household maintenance, IADLs: safety procedures and IADLs: meal prep/ clean up  From OT standpoint, recommendation at time of d/c would be Home with outpatient rehabilitation  Goals   Patient Goals to go home   Plan   Treatment Interventions ADL retraining;Functional transfer training; Endurance training;UE strengthening/ROM; Patient/family training; Compensatory technique education; Activityengagement; Energy conservation;Equipment evaluation/education   Goal Expiration Date 04/29/22   OT Treatment Day 0   OT Frequency 3-5x/wk   Recommendation   OT Discharge Recommendation Home with outpatient rehabilitation   OT - OK to Discharge Yes  (Once medically cleared )   Additional Comments  At end of eval, pt seated OOB in recliner with all needs met and call bell within reach    Additional Comments 2 The patient's raw score on the AM-PAC Daily Activity inpatient short form is 20, standardized score is 42 03, greater than 39 4  Patients at this level are likely to benefit from discharge to home  Please refer to the recommendation of the Occupational Therapist for safe discharge planning     AM-PAC Daily Activity Inpatient   Lower Body Dressing 3   Bathing 3   Toileting 3   Upper Body Dressing 3   Grooming 4   Eating 4   Daily Activity Raw Score 20   Daily Activity Standardized Score (Calc for Raw Score >=11) 42 03 AM-PAC Applied Cognition Inpatient   Following a Speech/Presentation 4   Understanding Ordinary Conversation 4   Taking Medications 4   Remembering Where Things Are Placed or Put Away 4   Remembering List of 4-5 Errands 4   Taking Care of Complicated Tasks 4   Applied Cognition Raw Score 24   Applied Cognition Standardized Score 62 21     GOALS:    *ADL transfers with (I) for inc'd independence with ADLs/purposeful tasks    *UB ADL with (I) for inc'd independence with self cares    *LB ADL with (I) using AE prn for inc'd independence with self cares    *Toileting with (I) for clothing management and hygiene for return to PLOF with personal care    *Increase stand tolerance x5 m for inc'd tolerance with standing purposeful tasks    *Participate in 10m UE therex to increase overall stamina/activity tolerance for purposeful tasks    *Bed mobility- (I) for inc'd independence to manage own comfort and initiate EOB & OOB purposeful tasks    *Patient will verbalize 3 safety awareness/ principles to prevent falls in the home setting  *Patient will verbalize and demonstrate use of energy conservation/deep breathing techniques and work simplification skills during functional activities with no verbal cues  *Patient will increase OOB/sitting tolerance to 2-4 hours per day to increase participation in self-care and leisure tasks with no s/s of exertion       *Pt will demonstrate use of long handled AE during 100% of tx sessions for increased ADL safety and independence following D/C     ANANT Ludwig, OTR/L

## 2022-04-19 NOTE — ASSESSMENT & PLAN NOTE
· With acute respiratory failure hypoxia; SpO2 of 88% on room air  · Continue Tamiflu 30 mg b i d  2/5 days  · Supportive care  · Respiratory protocol  · Titrated off of oxygen as tolerated

## 2022-04-19 NOTE — PLAN OF CARE
Problem: OCCUPATIONAL THERAPY ADULT  Goal: Performs self-care activities at highest level of function for planned discharge setting  See evaluation for individualized goals  Description: Treatment Interventions: ADL retraining,Functional transfer training,Endurance training,UE strengthening/ROM,Patient/family training,Compensatory technique education,Activityengagement,Energy conservation,Equipment evaluation/education          See flowsheet documentation for full assessment, interventions and recommendations  Note: Limitation: Decreased ADL status,Decreased UE strength,Decreased endurance,Decreased self-care trans,Decreased high-level ADLs  Prognosis: Good  Assessment: Patient is a 78 y o  male seen for OT evaluation s/p admit to [place] [address] on 4/18/2022 w/Pulmonary embolus ([address] )  Commorbidities affecting patient's functional performance at time of assessment include: PATRICK, HTN, influenza A and sepsis  Orders placed for OT evaluation and treatment and up with assistance  Performed at least two patient identifiers during session including name and wristband  Prior to admission, Patient reporting being independent with ADLs,  ambulatory with no AD and lives with family in a two story house  Personal factors affecting patient at time of initial evaluation include: steps to enter, difficulty performing ADLs and difficulty performing IADLs  Upon evaluation, patient requires supervision assist for UB ADLs, minimal  assist for LB ADLs, transfers and functional ambulation in room and bathroom with supervision assist, with the use of no AD  Patient is oriented x 4 and presents with ability to recognize a problem, define a problem, identify alternative plan, select a plan, organize steps in a plan, implement plan and evaluate outcome (problem solving)    Occupational performance is affected by the following deficits: decreased muscle strength, dynamic sit/ stand balance deficit with poor standing tolerance time for self care and functional mobility, decreased activity tolerance and delayed righting and equilibrium reactions  Based on the mentioned OT evaluation outcomes, functional performance deficits, and assessment findings, pt has been identified as a moderate complexity evaluation  Patient to benefit from continued Occupational Therapy treatment while in the hospital to address deficits as defined above and maximize level of functional independence with ADLs and functional mobility  Occupational Performance areas to address include: grooming , bathing/ shower, dressing, toilet hygiene, transfer to all surfaces, functional ambulation, medication routine/ management, IADLS: Household maintenance, IADLs: safety procedures and IADLs: meal prep/ clean up  From OT standpoint, recommendation at time of d/c would be Home with outpatient rehabilitation        OT Discharge Recommendation: Home with outpatient rehabilitation  OT - OK to Discharge: Yes (Once medically cleared )     Faisal Rowe, OT

## 2022-04-19 NOTE — ASSESSMENT & PLAN NOTE
· The patient meets SIRS criteria with tachypnea, leukocytosis, and fever with underlying infection of influenza A    · Unlikely underlying bacterial pneumonia given negative procalcitonin, strep/legionella urine antigens   · Received IV cefepime x1 dose in the ED  Azithromycin added x1 day  Will discontinue antibiotics at this time, given this is unlikely bacterial pneumonia  · Continue treatment with Tamiflu  · Leukocytosis improving, continue to trend a m   CBC

## 2022-04-19 NOTE — PLAN OF CARE
Problem: PHYSICAL THERAPY ADULT  Goal: Performs mobility at highest level of function for planned discharge setting  See evaluation for individualized goals  Description: Treatment/Interventions: LE strengthening/ROM,Elevations,Therapeutic exercise,Endurance training,Patient/family training,Gait training,Spoke to nursing  Equipment Recommended:  (TBD pending progress)       See flowsheet documentation for full assessment, interventions and recommendations  Note: Prognosis: Good  Problem List: Decreased strength,Decreased endurance,Impaired balance,Decreased mobility  Assessment: Pt is 78 y o  male seen for high-complexity PT evaluation on 4/19/2022 s/p admit to [address] [place] [address] on 4/18/2022 w/ Pulmonary embolus ([address] )  PT was consulted to assess pt's functional mobility and d/c needs  Order placed for PT eval and tx, w/ up w/ A order  PTA, pt reports living in 2  with wife and family, amb s AD at baseline, endorses 1 fall in recent months, and reports (I) ADL performance  At time of eval, pt performs all mobility tasks at SUP level, with tolerance to amb x 30 ft s AD  Upon evaluation, pt presenting with impaired functional mobility d/t decreased strength, decreased endurance, impaired balance and decreased mobility  Pertinent PMHx and current co-morbidities affecting pt's physical performance at time of assessment include: PATRICK, HTN, influenza A and sepsis  Personal factors affecting pt at time of eval include: increased age  The following objective measures performed on IE also reveal limitations: AM-PAC 6-Clicks: 71/45  Pt's clinical presentation is currently unstable/unpredictable seen in pt's presentation of ongoing medical assessment and pt remains on continuous heparin drip d/t acute PE   Overall, pt's rehab potential and prognosis to return to PLOF is good as impacted by objective findings, warranting pt to receive further skilled PT interventions to address identified impairments, activity limitation(s), and participation restriction(s)  Goal for patient is to return home  Pt to benefit from continued PT tx to address deficits as defined above and maximize level of functional independent mobility and consistency in order for pt to improve OOB activity tolerance  From PT/mobility standpoint, recommendation at time of d/c would be home with home health rehabilitation pending progress in order to facilitate return to PLOF  Barriers to Discharge: Inaccessible home environment ([address])        PT Discharge Recommendation: Home with home health rehabilitation          See flowsheet documentation for full assessment

## 2022-04-19 NOTE — PHYSICAL THERAPY NOTE
Physical Therapy Evaluation   Time in: 1222  Time out: 1235  Total evaluation time: 13 minutes    Patient's Name: Margaret Has    Admitting Diagnosis  Flu [J11 1]  Pulmonary embolus ([facility] ) [I26 99]  Weakness [R53 1]    Problem List  Patient Active Problem List   Diagnosis    Trochanteric bursitis of left hip    Essential hypertension    Hyperlipidemia    History of deep venous thrombosis (DVT) of distal vein of right lower extremity    Hyperglycemia    Tremor of both hands    BMI 28 0-28 9,adult    Medicare annual wellness visit, subsequent    Peripheral vascular disease with claudication [facility]    Pulmonary embolus ([address]    Influenza A    PATRICK (acute kidney injury) ([facility]    Abnormal CT scan    Sepsis ([facility] )       Past Medical History  Past Medical History:   Diagnosis Date    Hx of blood clots     Hyperlipidemia     Hypertension        Past Surgical History  Past Surgical History:   Procedure Laterality Date    HERNIA REPAIR      KNEE ARTHROSCOPY Right 07/31/2015    PROSTATE SURGERY         PT performed at least 2 patient identifiers during session: Name and wristband  04/19/22 1226   PT Last Visit   PT Visit Date 04/19/22   Note Type   Note type Evaluation   Pain Assessment   Pain Assessment Tool 0-10   Pain Score No Pain   Restrictions/Precautions   Weight Bearing Precautions Per Order No   Other Precautions Droplet precautions;Contact/isolation;Multiple lines;Telemetry; Fall Risk   Home Living   Type of [address] Two level;Performs ADLs on one level;Stairs to enter with rails  (pt enters in basement c 1 DAVID; 21 stairs upstairs)   Bathroom Shower/Tub Walk-in shower   Bathroom Toilet Standard   Bathroom Equipment Grab bars in [facility] [address];Walker  (no AD used at baseline)   Prior Function   Level of [place] Independent with ADLs and functional mobility   Lives With Spouse; Family  (granddtr and family)   Receives Help From Family   ADL Assistance Independent   IADLs Needs assistance   Falls in the last 6 months 1 to 4  (1 mechanical fall - slipped on snow)   Vocational Retired   Comments pt drives   General   Family/Caregiver Present No   Cognition   Arousal/Participation Alert   Orientation Level Oriented X4   Memory Within functional limits   Following Commands Follows all commands and directions without difficulty   Comments pt agreeable to PT session, motivated to participate   Subjective   Subjective "I feel pretty good"   Strength RLE   RLE Overall Strength 4/5   Strength LLE   LLE Overall Strength 4/5   Vision-Basic Assessment   Current Vision Wears glasses only for reading   Coordination   Movements are Fluid and Coordinated 1   Sensation WFL   Bed Mobility   Supine to Sit 5  Supervision   Additional items Assist x 1;HOB elevated; Increased time required   Sit to Supine   (DNT: pt seated OOB in recliner at end of eval )   Additional Comments Pt denied lightheaded/dizziness with transitional movements    Transfers   Sit to Stand 5  Supervision   Additional items Assist x 1;Bedrails; Increased time required   Stand to Sit 5  Supervision   Additional items Assist x 1; Armrests; Increased time required   Ambulation/Elevation   Gait pattern Improper Weight shift;Decreased foot clearance; Short stride   Gait Assistance 5  Supervision   Additional items Assist x 1;Verbal cues   Assistive Device None   Distance 30 ft   Stair Management Assistance Not tested   Balance   Static Sitting Good   Dynamic Sitting Fair +   Static Standing Fair +   Dynamic Standing Fair   Ambulatory Fair   Endurance Deficit   Endurance Deficit Yes   Activity Tolerance   Activity Tolerance Patient limited by fatigue   Medical Staff Made Aware Pt seen as a co-eval with OT due to the patient's co-morbidities & clinically unstable presentation   Nurse Made Aware RN made aware of outcomes   Assessment   Prognosis Good   Problem List Decreased strength;Decreased endurance; Impaired balance;Decreased mobility   Assessment Pt is 78 y o  male seen for high-complexity PT evaluation on 4/19/2022 s/p admit to [facility] on 4/18/2022 w/ Pulmonary embolus ([address] )  PT was consulted to assess pt's functional mobility and d/c needs  Order placed for PT eval and tx, w/ up w/ A order  PTA, pt reports living in 2  with wife and family, amb s AD at baseline, endorses 1 fall in recent months, and reports (I) ADL performance  At time of eval, pt performs all mobility tasks at SUP level, with tolerance to amb x 30 ft s AD  Upon evaluation, pt presenting with impaired functional mobility d/t decreased strength, decreased endurance, impaired balance and decreased mobility  Pertinent PMHx and current co-morbidities affecting pt's physical performance at time of assessment include: PATRICK, HTN, influenza A and sepsis  Personal factors affecting pt at time of eval include: increased age  The following objective measures performed on IE also reveal limitations: AM-PAC 6-Clicks: 10/69  Pt's clinical presentation is currently unstable/unpredictable seen in pt's presentation of ongoing medical assessment and pt remains on continuous heparin drip d/t acute PE  Overall, pt's rehab potential and prognosis to return to PLOF is good as impacted by objective findings, warranting pt to receive further skilled PT interventions to address identified impairments, activity limitation(s), and participation restriction(s)  Goal for patient is to return home  Pt to benefit from continued PT tx to address deficits as defined above and maximize level of functional independent mobility and consistency in order for pt to improve OOB activity tolerance  From PT/mobility standpoint, recommendation at time of d/c would be home with home health rehabilitation pending progress in order to facilitate return to PLOF     Barriers to Discharge Inaccessible home environment  ([address])   Goals   Patient Goals "to go home"   STG Expiration Date 04/29/22   Short Term Goal #1 In 7-10 days: Increase bilateral LE strength 1/2 grade to facilitate independent mobility, Perform all transfers modified independent to improve independence, Ambulate > 125 ft  with least restrictive assistive device modified independent w/o LOB and w/ normalized gait pattern 100% of the time, Navigate FF of stairs with close S with unilateral handrail to facilitate return to previous living environment, Increase all balance 1/2 grade to decrease risk for falls and Complete exercise program independently   PT Treatment Day 0   Plan   Treatment/Interventions LE strengthening/ROM; Elevations; Therapeutic exercise; Endurance training;Patient/family training;Gait training;Spoke to nursing   PT Frequency 3-5x/wk   Recommendation   PT Discharge Recommendation Home with home health rehabilitation   Equipment Recommended   (TBD pending progress)   Additional Comments Pt's raw score on the [facility] Basic Mobility inpatient short form is 19, standardized score is 42 48  Patients at this level are likely to benefit from DC to home with home care, however, please refer to therapist recommendation for safe DC planning  [facility] Basic Mobility Inpatient   Turning in Bed Without Bedrails 4   Lying on Back to Sitting on Edge of Flat Bed 3   Moving Bed to Chair 3   Standing Up From Chair 3   Walk in Room 3   Climb 3-5 Stairs 3   Basic Mobility Inpatient Raw Score 19   Basic Mobility Standardized Score 42 48   Highest Level Of Mobility   JH-HLM Goal 6: Walk 10 steps or more   JH-HLM Highest Level of Mobility 7: Walk 25 feet or more   JH-HLM Goal Achieved Yes   End of Consult   Patient Position at End of Consult Bedside chair; All needs within reach  (pt provided assist with lunch tray setup)       Lara Mario, PT, DPT

## 2022-04-19 NOTE — CONSULTS
Medical Oncology/Hematology Consult Note  Catarina Landau, male, 78 y o , 1940,  ICU 03/ICU 03-01, [id number]     Assessment and Plan  1  Pulmonary embolism:  Infection/influenza likely contributing factor  He does have history of unprovoked DVT about 10 years ago that was treated with anticoagulation Coumadin, little over 1 year worht  Additional workup in process and will be ordered to rule out malignancy as reason for hypercoagulability especially in light of suspicious rib fractures  Thrombosis panel was drawn and most pending; several test may not be accurate as they were drawn after acute thrombosis  His beta 2 glycoprotein and anticardiolipin antibodies came back in the negative range ruling out antiphospholipid syndrome  Awaiting factor V and prothrombin gene mutation to rule out inherited etiology  Patient is currently on a heparin drip that can be transitioned to DOAC at discharge  Indefinite anticoagulation would be advised since this is his 2nd clotting event  2  Rib fracture:  Patient noted to have fracture of the left 10th and 11th ribs one which demonstrates possible lytic component suggesting possible pathologic fracture  Patient states that he did have a fall recently about 5-6 weeks ago and fell on his left side, could be reason for fracture; he has been having pain to the left side/region since his fall  We will order additional laboratory workup to rule rule out multiple myeloma especially in light of concurrent renal dysfunction  Will also check PSA since he has history of BPH/urinary issues  Patient did have nuclear follow-up on nuclear bone scan which was done this afternoon; results are pending  3  Macrocytosis: TSH is normal   Check vitamin Q51 folic acid  4  Leukocytosis:  Likely reactive in nature due to infection/influenza, inflammatory process  Continue to monitor      5  Mild small bowel dilatation:  KUB showed mildly prominent air-filled small bowel loops which may represent mild ileus  Hospitalist team was in touch with surgery who recommended conservative management/monitoring since he was asymptomatic  Patient states that he did not move his bowels since Sunday  Advised him to notify medical team/nurse if he develops any worsening GI symptoms  Will order MiraLax p r n  for constipation  Check CEA for completeness sake  Will continue to monitor patient and his studies remotely  Will follow up with him while in hospital if needed  Otherwise he can follow-up with us as an outpatient after discharge to review his remaining workup/a decision if further workup is necessary  Reason for consultation:  Pulmonary embolism, abnormal CT scan    History of present illness:  Patient is a pleasant 68-year-old male with past medical history of hypertension, hyperlipidemia, BPH, unprovoked DVT to the right lower extremity around 2012  He states that the blood clot happened after an increase of his medication for his BPH  He was on little over a year's worth of Coumadin which was then discontinued  He denies any family history of thrombosis  He is a former smoker quit 1979 after about 20 years  Mentions that his mother passed away from lung cancer due to smoking  He mentions that he did have a fall recently about 5-6 weeks ago, slipped in the snow when he was feeding the squirrels  He did land on his left side and has been having pain to his left side/rib area since that time  He presented to the emergency room yesterday with complaints of progressing generalized weakness and shortness of breath  Illness was going around in his household  He was found to be positive for the flu upon arrival   The patient states he is feeling somewhat better today  He denies any pain  States his appetite has been okay  He reports that he does have issues with constipation and has not moved his bowels since Sunday  He does continue to have harsh cough      His most recent laboratory studies from this morning showed leukocytosis WBC 13 99 with neutrophilia and monocytosis no immature cells noted, he is not anemic H&H 13 6/41 7, he has macrocytosis , platelet count normal 162  He does have renal dysfunction creatinine 1 4, GFR 46, albumin 3 2, total protein 6 0 remaining metabolic panel is appropriate for patient  TSH is normal 1 94  He did have thrombosis panel drawn yesterday much of which is still pending  Antithrombin II activity is decreased 82% due to acute thrombosis, a beta 2 glycoprotein and anticardiolipin antibodies are in the negative range ruling out antiphospholipid syndrome  His CT scan of the chest abdomen pelvis with contrast yesterday 04/18/2022 showed:  IMPRESSION:  Large pulmonary embolism in the right lower lobe with associated infiltrate  Some right heart strain could be present at the RV LV ratio being approximately 1 although judged with standard CT imaging  Possible DVT in the left femoral vein  No evidence of mass or adenopathy exception of one mildly prominent retroperitoneal node  Fractures of the left 10th and 11th ribs one of which demonstrates possible lytic component suggesting possible pathologic fracture  Bone scan may be useful given the presence of PE  Scattered renal cysts one of which may be mildly hyperdense measuring 1 1 cm  This is adjacent to a larger cyst  Follow-up focused ultrasound be useful  Mild small bowel dilatation without obvious of transition likely related to ileus follow-up may be helpful if symptoms should persist     He did have a KUB this morning to follow-up on his mild small bowel dilatation which did not reveal obstruction showed mildly prominent air-filled small bowel loops which may represent mild ileus      He had a venous duplex of the bilateral lower extremity today as well which showed chronic RLE DVT:  CONCLUSION:  RIGHT LOWER LIMB:  There is evidence of chronic deep vein thrombosis in the proximal and mid  femoral vein  No evidence of superficial thrombophlebitis noted  Doppler evaluation shows a normal response to augmentation maneuvers  Popliteal, posterior tibial and anterior tibial arterial Doppler waveforms are  biphasic/monophasic  LEFT LOWER LIMB:  No evidence of acute or chronic deep vein thrombosis  No evidence of superficial thrombophlebitis noted  Doppler evaluation shows a normal response to augmentation maneuvers  Popliteal, posterior tibial and anterior tibial arterial Doppler waveforms are  biphasic/monophasic  He also had a nuclear medicine bone scan done today results are still pending at this time  Review of Systems:   Review of Systems   Constitutional: Positive for activity change and fatigue  Negative for appetite change and fever  HENT: Positive for sore throat  Negative for mouth sores and nosebleeds  Respiratory: Positive for cough and shortness of breath  Cardiovascular: Negative for chest pain and leg swelling  Gastrointestinal: Positive for constipation  Negative for abdominal pain and nausea  Genitourinary: Negative for difficulty urinating  Skin: Negative for rash  Neurological: Positive for weakness  Negative for headaches  Hematological: Negative for adenopathy  Psychiatric/Behavioral: Positive for dysphoric mood         Past Medical History:   Diagnosis Date    Hx of blood clots     Hyperlipidemia     Hypertension        Past Surgical History:   Procedure Laterality Date    HERNIA REPAIR      KNEE ARTHROSCOPY Right 07/31/2015    PROSTATE SURGERY         Family History   Problem Relation Age of Onset    Lung cancer Mother     Ulcers Father     No Known Problems Sister     Heart attack Brother     Alcohol abuse Brother     Kidney disease Brother        Social History     Socioeconomic History    Marital status: /Civil Union     Spouse name: None    Number of children: None    Years of education: None    Highest education level: None   Occupational History    None   Tobacco Use    Smoking status: Former Smoker     Quit date:      Years since quittin 3    Smokeless tobacco: Never Used    Tobacco comment: 2+ppd x 20 years    Vaping Use    Vaping Use: Never used   Substance and Sexual Activity    Alcohol use: Not Currently    Drug use: Not Currently    Sexual activity: Not Currently   Other Topics Concern    None   Social History Narrative    Retired, lives with his wife, boubacar and great-grandson    [employer] [place] [id number]     Social Determinants of Health     Financial Resource Strain: Not on file   Food Insecurity: No Food Insecurity    Worried About [address] in the Last Year: Never true    Nikos of Food in the Last Year: Never true   Transportation Needs: No Transportation Needs    Lack of Transportation (Medical): No    Lack of Transportation (Non-Medical):  No   Physical Activity: Not on file   Stress: Not on file   Social Connections: Not on file   Intimate Partner Violence: Not on file   Housing Stability: Low Risk     Unable to Pay for Housing in the Last Year: No    Number of Places Lived in the Last Year: 1    Unstable Housing in the Last Year: No         Current Facility-Administered Medications:     acetaminophen (TYLENOL) tablet 650 mg, 650 mg, Oral, Q6H PRN, Esmer Prudent, CRNP    albuterol inhalation solution 2 5 mg, 2 5 mg, Nebulization, Q4H PRN, George Dickens DO    ascorbic acid (VITAMIN C) tablet 500 mg, 500 mg, Oral, Daily, Esmer Prudent, CRNP, 500 mg at 22 1000    cholecalciferol (VITAMIN D3) tablet 800 Units, 800 Units, Oral, Daily, Esmer Prudent, CRNP, 800 Units at 22 1000    diltiazem (CARDIZEM CD) 24 hr capsule 240 mg, 240 mg, Oral, Daily, Esmer Prudent, CRNP, 240 mg at 22 1000    fish oil capsule 2,000 mg, 2,000 mg, Oral, Daily, Esmer Prudent, CRNP, 2,000 mg at 22 1000    heparin (porcine) 25,000 units in 0 45% NaCl 250 mL infusion (premix), 3-30 Units/kg/hr (Order-Specific), Intravenous, Titrated, ELIJAH Hernandez, Last Rate: 13 6 mL/hr at 04/19/22 1205, 17 Units/kg/hr at 04/19/22 1205    heparin (porcine) injection 3,200 Units, 3,200 Units, Intravenous, Q1H PRN, Brenda Ackerman CRNP, 3,200 Units at 04/19/22 1203    heparin (porcine) injection 6,400 Units, 6,400 Units, Intravenous, Q1H PRN, SHANNA HernandezNP    hydrALAZINE (APRESOLINE) injection 10 mg, 10 mg, Intravenous, Q6H PRN, ELIJAH Hernandez, 10 mg at 04/18/22 1530    oseltamivir (TAMIFLU) capsule 30 mg, 30 mg, Oral, Q12H EDGAR, SHANNA HernandezNP, 30 mg at 04/19/22 1000    pravastatin (PRAVACHOL) tablet 20 mg, 20 mg, Oral, Daily With Dinner, ELIJAH Hernandez, 20 mg at 04/18/22 1536    Medications Prior to Admission   Medication    ascorbic Acid (VITAMIN C) 500 MG CPCR    aspirin 81 mg chewable tablet    b complex vitamins capsule    Bos[place]-Glucosamine-Vit D (OSTEO BI-FLEX ONE PER DAY PO)    cholecalciferol (VITAMIN D3) 400 units tablet    diltiazem (DILACOR XR) 240 MG 24 hr capsule    lisinopril-hydrochlorothiazide (PRINZIDE,ZESTORETIC) 20-25 MG per tablet    magnesium 30 MG tablet    Omega-3 Fatty Acids (fish oil) 1,000 mg    Potassium 99 MG TABS    pravastatin (PRAVACHOL) 40 mg tablet       No Known Allergies      Physical Exam:    /73 (BP Location: Left arm)   Pulse 73   Temp 97 9 °F (36 6 °C) (Tympanic)   Resp (!) 30   Ht 5' 8" (1 727 m)   Wt 82 6 kg (182 lb)   SpO2 92%   BMI 27 67 kg/m²     Physical Exam  Vitals reviewed  Constitutional:       General: He is not in acute distress  Appearance: He is well-developed  He is obese  He is ill-appearing  He is not diaphoretic  HENT:      Head: Normocephalic and atraumatic  Mouth/Throat:      Mouth: Mucous membranes are moist       Pharynx: Oropharynx is clear  No oropharyngeal exudate or posterior oropharyngeal erythema     Eyes: General: Lids are normal  No scleral icterus  Conjunctiva/sclera: Conjunctivae normal       Pupils: Pupils are equal, round, and reactive to light  Neck:      Thyroid: No thyromegaly  Cardiovascular:      Rate and Rhythm: Normal rate and regular rhythm  Heart sounds: Normal heart sounds  No murmur heard  Pulmonary:      Effort: Pulmonary effort is normal  No respiratory distress  Breath sounds: Decreased breath sounds, wheezing and rhonchi (anterior-clears with cough) present  Chest:   Breasts:      Right: No axillary adenopathy  Left: No axillary adenopathy  Abdominal:      General: There is no distension  Palpations: Abdomen is soft  There is no hepatomegaly or splenomegaly  Tenderness: There is no abdominal tenderness  Musculoskeletal:         General: No swelling  Normal range of motion  Cervical back: Normal range of motion and neck supple  Lymphadenopathy:      Cervical: No cervical adenopathy  Upper Body:      Right upper body: No axillary adenopathy  Left upper body: No axillary adenopathy  Skin:     General: Skin is warm and dry  Findings: No erythema or rash  Neurological:      General: No focal deficit present  Mental Status: He is alert and oriented to person, place, and time  Psychiatric:         Mood and Affect: Mood is depressed  Affect is blunt  Behavior: Behavior normal  Behavior is cooperative  Thought Content:  Thought content normal          Judgment: Judgment normal          Recent Results (from the past 48 hour(s))   CBC and differential    Collection Time: 04/18/22 10:27 AM   Result Value Ref Range    WBC 15 07 (H) 4 31 - 10 16 Thousand/uL    RBC 4 60 3 88 - 5 62 Million/uL    Hemoglobin 15 3 12 0 - 17 0 g/dL    Hematocrit 46 6 36 5 - 49 3 %     (H) 82 - 98 fL    MCH 33 3 26 8 - 34 3 pg    MCHC 32 8 31 4 - 37 4 g/dL    RDW 13 0 11 6 - 15 1 %    MPV 10 0 8 9 - 12 7 fL    Platelets 504 866 - 390 Thousands/uL    nRBC 0 /100 WBCs    Neutrophils Relative 79 (H) 43 - 75 %    Immat GRANS % 1 0 - 2 %    Lymphocytes Relative 9 (L) 14 - 44 %    Monocytes Relative 11 4 - 12 %    Eosinophils Relative 0 0 - 6 %    Basophils Relative 0 0 - 1 %    Neutrophils Absolute 11 90 (H) 1 85 - 7 62 Thousands/µL    Immature Grans Absolute 0 07 0 00 - 0 20 Thousand/uL    Lymphocytes Absolute 1 35 0 60 - 4 47 Thousands/µL    Monocytes Absolute 1 67 (H) 0 17 - 1 22 Thousand/µL    Eosinophils Absolute 0 05 0 00 - 0 61 Thousand/µL    Basophils Absolute 0 03 0 00 - 0 10 Thousands/µL   Comprehensive metabolic panel    Collection Time: 04/18/22 10:27 AM   Result Value Ref Range    Sodium 136 135 - 147 mmol/L    Potassium 3 7 3 5 - 5 3 mmol/L    Chloride 99 96 - 108 mmol/L    CO2 31 21 - 32 mmol/L    ANION GAP 6 4 - 13 mmol/L    BUN 29 (H) 5 - 25 mg/dL    Creatinine 1 62 (H) 0 60 - 1 30 mg/dL    Glucose 126 65 - 140 mg/dL    Calcium 9 3 8 4 - 10 2 mg/dL    AST 13 13 - 39 U/L    ALT 17 7 - 52 U/L    Alkaline Phosphatase 46 34 - 104 U/L    Total Protein 7 3 6 4 - 8 4 g/dL    Albumin 3 8 3 5 - 5 0 g/dL    Total Bilirubin 0 63 0 20 - 1 00 mg/dL    eGFR 38 ml/min/1 73sq m   Lactic acid    Collection Time: 04/18/22 10:27 AM   Result Value Ref Range    LACTIC ACID 1 1 0 5 - 2 0 mmol/L   Procalcitonin    Collection Time: 04/18/22 10:27 AM   Result Value Ref Range    Procalcitonin 0 10 <=0 25 ng/ml   Protime-INR    Collection Time: 04/18/22 10:27 AM   Result Value Ref Range    Protime 14 7 (H) 11 6 - 14 5 seconds    INR 1 16 0 84 - 1 19   APTT    Collection Time: 04/18/22 10:27 AM   Result Value Ref Range    PTT 32 23 - 37 seconds   Blood culture #2    Collection Time: 04/18/22 10:27 AM    Specimen: Arm, Right; Blood   Result Value Ref Range    Blood Culture Received in Microbiology Lab  Culture in Progress      Lipase    Collection Time: 04/18/22 10:27 AM   Result Value Ref Range    Lipase 23 11 - 82 u/L   B-Type Natriuretic Peptide(BNP) [place], [facility], [facility] Only    Collection Time: 04/18/22 10:27 AM   Result Value Ref Range     (H) 1 - 100 pg/mL   Blood culture #1    Collection Time: 04/18/22 10:42 AM    Specimen: Arm, Right; Blood   Result Value Ref Range    Blood Culture Received in Microbiology Lab  Culture in Progress      COVID/FLU/RSV - 2 hour TAT    Collection Time: 04/18/22 10:42 AM    Specimen: Nose; Nares   Result Value Ref Range    SARS-CoV-2 Negative Negative    INFLUENZA A PCR Positive (A) Negative    INFLUENZA B PCR Negative Negative    RSV PCR Negative Negative   ECG 12 lead    Collection Time: 04/18/22 10:44 AM   Result Value Ref Range    Ventricular Rate 81 BPM    Atrial Rate 81 BPM    VT Interval 200 ms    QRSD Interval 94 ms    QT Interval 362 ms    QTC Interval 420 ms    P Axis 80 degrees    QRS Axis 56 degrees    T Wave [place] 81 degrees   HS Troponin 0hr (reflex protocol)    Collection Time: 04/18/22 12:48 PM   Result Value Ref Range    hs TnI 0hr 24 "Refer to ACS Flowchart"- see link ng/L   UA w Reflex to Microscopic w Reflex to Culture    Collection Time: 04/18/22 12:49 PM    Specimen: Urine, Clean Catch   Result Value Ref Range    Color, UA Yellow Yellow, Straw    Clarity, UA Clear Hazy, Clear    Specific Gravity, UA 1 015 >1 005-<1 030    pH, UA 5 0 5 0, 5 5, 6 0, 6 5, 7 0, 7 5    Leukocytes, UA Negative Negative    Nitrite, UA Negative Negative    Protein, UA 1+ (A) Negative, Interference- unable to analyze mg/dl    Glucose, UA Negative Negative mg/dl    Ketones, UA Negative Negative mg/dl    Urobilinogen, UA 0 2 0 2, 1 0 E U /dl E U /dl    Bilirubin, UA Negative Negative    Blood, UA Negative Negative   Urine Microscopic    Collection Time: 04/18/22 12:49 PM   Result Value Ref Range    RBC, UA None Seen None Seen, 0-1, 1-2, 2-4, 0-5 /hpf    WBC, UA None Seen None Seen, 0-1, 1-2, 0-5, 2-4 /hpf    Epithelial Cells None Seen None Seen, Occasional /hpf    Bacteria, UA None Seen None Seen, Occasional /hpf   Cardiolipin antibody    Collection Time: 04/18/22  1:15 PM   Result Value Ref Range    ANTICARDIOLIPIN IGG ANTIBODY 3 5 See comment    ANTICARDIOLIPIN IGA ANTIBODY 3 2 See comment    ANTICARDIOLIPIN IGM ANTIBODY 4 5 See comment   Beta-2 glycoprotein antibodies    Collection Time: 04/18/22  1:15 PM   Result Value Ref Range    BETA 2 GLYCO 1 IGG 0 8 See comment    BETA 2 GLYCO 1 IGA 2 7 See comment    BETA 2 GLYCO 1 IGM 4 8 See comment   APTT    Collection Time: 04/18/22  1:15 PM   Result Value Ref Range    PTT 30 23 - 37 seconds   Protime-INR    Collection Time: 04/18/22  1:15 PM   Result Value Ref Range    Protime 15 2 (H) 11 6 - 14 5 seconds    INR 1 21 (H) 0 84 - 1 19   Antithrombin III Activity    Collection Time: 04/18/22  1:16 PM   Result Value Ref Range    AntiThrombIN III Activity 82 (L) 92 - 136 % of Normal   ECG 12 lead    Collection Time: 04/18/22  3:10 PM   Result Value Ref Range    Ventricular Rate 86 BPM    Atrial Rate 86 BPM    [place] Interval 188 ms    QRSD Interval 98 ms    QT Interval 378 ms    QTC Interval 452 ms    P Axis 86 degrees    QRS Axis 46 degrees    T Wave [place] 83 degrees   HS Troponin I 2hr    Collection Time: 04/18/22  3:17 PM   Result Value Ref Range    hs TnI 2hr 27 "Refer to ACS Flowchart"- see link ng/L    Delta 2hr hsTnI 3 <20 ng/L   Strep Pneumoniae, Urine    Collection Time: 04/18/22  3:41 PM    Specimen: Urine, Clean Catch   Result Value Ref Range    Strep pneumoniae antigen, urine Negative Negative   Legionella antigen, urine    Collection Time: 04/18/22  3:41 PM    Specimen: Urine, Clean Catch   Result Value Ref Range    Legionella Urinary Antigen Negative Negative   HS Troponin I 4hr    Collection Time: 04/18/22  7:34 PM   Result Value Ref Range    hs TnI 4hr 32 "Refer to ACS Flowchart"- see link ng/L    Delta 4hr hsTnI 8 <20 ng/L   APTT    Collection Time: 04/18/22  7:35 PM   Result Value Ref Range     (HH) 23 - 37 seconds   ECG 12 lead    Collection Time: 04/18/22 11:05 PM   Result Value Ref Range    Ventricular Rate 79 BPM    Atrial Rate 79 BPM    NM Interval 184 ms    QRSD Interval 92 ms    QT Interval 394 ms    QTC Interval 451 ms    P Axis 87 degrees    QRS Axis 51 degrees    T Wave Axis 81 degrees   TSH, 3rd generation    Collection Time: 04/18/22 11:06 PM   Result Value Ref Range    TSH 3RD GENERATON 1 944 0 450 - 4 500 uIU/mL   HS Troponin 0hr (reflex protocol)    Collection Time: 04/18/22 11:06 PM   Result Value Ref Range    hs TnI 0hr 28 "Refer to ACS Flowchart"- see link ng/L   APTT    Collection Time: 04/19/22  3:12 AM   Result Value Ref Range    PTT 73 (H) 23 - 37 seconds   Comprehensive metabolic panel    Collection Time: 04/19/22  4:55 AM   Result Value Ref Range    Sodium 136 135 - 147 mmol/L    Potassium 3 6 3 5 - 5 3 mmol/L    Chloride 102 96 - 108 mmol/L    CO2 26 21 - 32 mmol/L    ANION GAP 8 4 - 13 mmol/L    BUN 24 5 - 25 mg/dL    Creatinine 1 40 (H) 0 60 - 1 30 mg/dL    Glucose 120 65 - 140 mg/dL    Calcium 8 8 8 4 - 10 2 mg/dL    Corrected Calcium 9 4 8 3 - 10 1 mg/dL    AST 14 13 - 39 U/L    ALT 17 7 - 52 U/L    Alkaline Phosphatase 38 34 - 104 U/L    Total Protein 6 0 (L) 6 4 - 8 4 g/dL    Albumin 3 2 (L) 3 5 - 5 0 g/dL    Total Bilirubin 0 57 0 20 - 1 00 mg/dL    eGFR 46 ml/min/1 73sq m   Magnesium    Collection Time: 04/19/22  4:55 AM   Result Value Ref Range    Magnesium 2 0 1 9 - 2 7 mg/dL   Phosphorus    Collection Time: 04/19/22  4:55 AM   Result Value Ref Range    Phosphorus 3 0 2 3 - 4 1 mg/dL   CBC and differential    Collection Time: 04/19/22  4:55 AM   Result Value Ref Range    WBC 13 99 (H) 4 31 - 10 16 Thousand/uL    RBC 4 07 3 88 - 5 62 Million/uL    Hemoglobin 13 6 12 0 - 17 0 g/dL    Hematocrit 41 7 36 5 - 49 3 %     (H) 82 - 98 fL    MCH 33 4 26 8 - 34 3 pg    MCHC 32 6 31 4 - 37 4 g/dL    RDW 13 2 11 6 - 15 1 %    MPV 10 3 8 9 - 12 7 fL    Platelets 124 041 - 948 Thousands/uL    nRBC 0 /100 WBCs    Neutrophils Relative 78 (H) 43 - 75 %    Immat GRANS % 1 0 - 2 %    Lymphocytes Relative 8 (L) 14 - 44 %    Monocytes Relative 12 4 - 12 %    Eosinophils Relative 1 0 - 6 %    Basophils Relative 0 0 - 1 %    Neutrophils Absolute 10 83 (H) 1 85 - 7 62 Thousands/µL    Immature Grans Absolute 0 09 0 00 - 0 20 Thousand/uL    Lymphocytes Absolute 1 17 0 60 - 4 47 Thousands/µL    Monocytes Absolute 1 72 (H) 0 17 - 1 22 Thousand/µL    Eosinophils Absolute 0 13 0 00 - 0 61 Thousand/µL    Basophils Absolute 0 05 0 00 - 0 10 Thousands/µL   Procalcitonin    Collection Time: 04/19/22  4:55 AM   Result Value Ref Range    Procalcitonin 0 13 <=0 25 ng/ml   Echo complete w/ contrast if indicated    Collection Time: 04/19/22  8:55 AM   Result Value Ref Range    A4C EF 43 %    LVIDd 3 80 4 83 - 7 19 cm    LVIDS 2 40 2 94 - 4 45 cm    IVSd 1 10 cm    LVPWd 0 90 0 52 - 0 99 cm    FS 37 28 - 44 %    MV E' Tissue Velocity Septal 8 cm/s    E wave deceleration time 276 ms    MV Peak E [place] 67 cm/s    MV Peak A [place] 0 96 m/s    AV LVOT peak gradient 4 mmHg    LVOT peak VTI 22 71 cm    LVOT peak gildardo 1 05 m/s    RVID d 3 4 cm    LA size 3 8 cm    Ao peak gildardo retrograde 1 9 m/s    Ao VTI 36 57 cm    AV mean gradient 7 mmHg    LVOT mn grad 3 0 mmHg    AV peak gradient 14 mmHg    MV stenosis pressure 1/2 time 80 ms    MV valve area p 1/2 method 2 75 cm2    TR Peak Gildardo 2 5 m/s    Triscuspid Valve Regurgitation Peak Gradient 24 0 mmHg    Ao root 3 00 cm    Aortic valve mean velocity 12 60 m/s    Tricuspid valve peak regurgitation velocity 2 45 m/s    Left ventricular stroke volume (2D) 41 00 mL    IVS 1 1 0 53 - 1 00 cm    LEFT VENTRICLE SYSTOLIC VOLUME (MOD BIPLANE) 2D 21 mL    LV DIASTOLIC VOLUME (MOD BIPLANE) 2D 62 mL    Left Atrium Area-systolic Four Chamber 45 2 cm2    LVSV, 2D 41 mL    AV Velocity Ratio 0 55     ZLVPWD 1 23     ZLVIDS -3 24     ZLVIDD -4 38     ZIVSD 2 80     LV EF 60    APTT    Collection Time: 04/19/22 10:32 AM   Result Value Ref Range    PTT 57 (H) 23 - 37 seconds XR chest portable    Result Date: 4/18/2022  Narrative: CHEST INDICATION:  SIRS, shortness of breath  COMPARISON:  4/18/2022 EXAM PERFORMED/VIEWS:  XR CHEST PORTABLE FINDINGS:  The patient is rotated to the left  Cardiomediastinal silhouette appears unremarkable  Suggestion of developing groundglass left perihilar infiltrate  Streaky bibasilar densities again noted suggesting subsegmental atelectasis or scarring  Some right basilar bronchial wall thickening or minimal bronchiectasis may be present  No pneumothorax or pleural effusion  Slight eventration of the left diaphragm may be related to air-filled splenic flexure  Old right posterior rib fracture  Degenerative changes of the spine and right AC joint  Impression: Question developing left perihilar infiltrate, possibly pneumonia  Follow-up advised  Workstation performed: [id number]     XR chest pa & lateral    Result Date: 4/18/2022  Narrative: CHEST INDICATION:  R50 9: Fever, unspecified  R05 9: Cough, unspecified  COMPARISON:  7/10/2015 EXAM PERFORMED/VIEWS:  XR CHEST PA & LATERAL FINDINGS: Cardiomediastinal silhouette appears unremarkable  Streaky bibasilar opacities likely representing subsegmental atelectasis or scarring  No definite infiltrates  No pneumothorax or pleural effusion  Old right posterior 8th rib fracture  Paravertebral ossifications thoracic spine  Impression: Streaky bibasilar opacities likely representing subsegmental atelectasis or scarring  No definite infiltrates  Followup imaging may be obtained for any persistent or worsening symptoms  Workstation performed: [id number]     XR abdomen 1 view kub    Result Date: 4/19/2022  Narrative: ABDOMEN INDICATION:   Suspected ileus  COMPARISON:  CT from yesterday  VIEWS:  AP supine FINDINGS: There is a nonobstructive bowel gas pattern  Few mildly prominent air-filled loops of small bowel again seen may represent mild ileus  No discernible free air on this supine study    Upright or left lateral decubitus imaging is more sensitive to detect subtle free air in the appropriate setting  There is excreted IV contrast in the bladder  Bibasilar subsegmental atelectasis  Stable small right effusion  Surgical clips from prior laparoscopic hernia repair  Left 10th and 11th rib fractures are better appreciated on CT  Impression:  IMPRESSION: No obstruction  Mildly prominent air-filled small bowel loops may represent mild ileus  Workstation performed: [id number]     CT chest abdomen pelvis w contrast    Result Date: 4/18/2022  Narrative: CT CHEST, ABDOMEN AND PELVIS WITH IV CONTRAST INDICATION:   sirs, possible pna  Cough shortness of breath weakness COMPARISON:  4/18/2022 chest x-ray; ultrasound from 10/27/2021 TECHNIQUE: CT examination of the chest, abdomen and pelvis was performed  Axial, sagittal, and coronal 2D reformatted images were created from the source data and submitted for interpretation  Radiation dose length product (DLP) for this visit:  786 41 mGy-cm   This examination, like all CT scans performed in the [facility], was performed utilizing techniques to minimize radiation dose exposure, including the use of iterative  reconstruction and automated exposure control  IV Contrast:  100 mL of iohexol (OMNIPAQUE) Enteric Contrast: Enteric contrast was administered  FINDINGS: CHEST LUNGS:  Groundglass opacities are noted in the right lower lobe with mild bronchial thickening  Mild bronchial thickening is also seen in the left lower lobe  Small bandlike opacity seen in the right upper lobe abutting the minor fissure  Bandlike atelectasis left lower lobe is identified some which is seen on prior chest x-ray  Lung opacity noted on chest x-ray is no longer seen and may have been related to mucous plugging  Some mucous is in the trachea  PLEURA:  Small right effusion HEART/GREAT VESSELS: Heart is unremarkable for patient's age  No thoracic aortic aneurysm   There is a large embolus in right lower lobe pulmonary artery extending from the right middle lobe origin into the basilar segments as well as the superior segment  A tiny right middle lobe embolus is also possible on image 38  The RV LV ratio appears somewhat elevated at 1 0 however it is judged on thicker section standard CT images  MEDIASTINUM AND NICHOLAS:  Small mediastinum and hilar nodes  CHEST WALL AND LOWER NECK:  Unremarkable  ABDOMEN LIVER/BILIARY TREE:  Unremarkable  GALLBLADDER:  No calcified gallstones  No pericholecystic inflammatory change  SPLEEN:  Unremarkable  PANCREAS:  Unremarkable  ADRENAL GLANDS:  Slight adrenal thickening is noted bilaterally  KIDNEYS/URETERS:  No hydronephrosis  Large left renal cyst with some smaller lesions which are more difficult to characterize  One arises from the upper pole the left kidney and measures 1 1 cm in size on coronal image 40 Hounsfield density is intermediate at approximately 26  STOMACH AND BOWEL:  This stomach is decompressed  Small hiatus hernia is probably present  A few loops of small bowel are borderline dilated measuring up to 2 5 cm in the upper abdomen  Some stool is scattered in the colon  No obvious zone of transition can be identified  APPENDIX:  The appendix is not well seen  ABDOMINOPELVIC CAVITY:  No ascites  No pneumoperitoneum  Slightly prominent lymph node measuring 7 mm on image 83  VESSELS:  Atherosclerotic changes of aorta is noted also involving the celiac and SMA  Question filling defect in the left common femoral vein versus mixing artifact  There is some asymmetry in size of the left common femoral vein as compared to the right  PELVIS REPRODUCTIVE ORGANS:  Unremarkable for patient's age  URINARY BLADDER:  Unremarkable  ABDOMINAL WALL/INGUINAL REGIONS:  Evidence of hernia repair with mesh is seen in the anterior abdominal wall inferiorly    OSSEOUS STRUCTURES:  No acute fracture or destructive osseous lesion   There appears be a lytic lesion with pathologic fracture involving the left 10th rib on image 50  Some callus formation is evident  Fracture also involves the left 11th rib with callus  formation and perhaps some lytic change  Densely sclerotic lesion involves the T4 vertebral body although spiculated margins suggest the possibility of bone island  Impression: Large pulmonary embolism in the right lower lobe with associated infiltrate  Some right heart strain could be present at the RV LV ratio being approximately 1 although judged with standard CT imaging  Possible DVT in the left femoral vein  No evidence of mass or adenopathy exception of one mildly prominent retroperitoneal node  Fractures of the left 10th and 11th ribs one of which demonstrates possible lytic component suggesting possible pathologic fracture  Bone scan may be useful given the presence of PE  Scattered renal cysts one of which may be mildly hyperdense measuring 1 1 cm  This is adjacent to a larger cyst  Follow-up focused ultrasound be useful  Mild small bowel dilatation without obvious of transition likely related to ileus follow-up may be helpful if symptoms should persist  This critical finding was discussed with NICOLAS Rudd at 12 noon Workstation performed: [id number]     US kidney and bladder    Result Date: 4/19/2022  Narrative: RENAL ULTRASOUND INDICATION:   jesenia, renal cyst    Recent CT showed a mildly hyperdense left renal cyst, ultrasound suggested  ICU protocol  COMPARISON: 4/18/2022 CT   10/27/2021 ultrasound TECHNIQUE:   Ultrasound of the retroperitoneum was performed with a curvilinear transducer utilizing volumetric sweeps and still imaging techniques  FINDINGS: KIDNEYS: Symmetric and normal size  Right kidney:  10 0 x 5 5 x 5 1 cm  Volume 146 1 mL Left kidney:  12 2 x 5 4 x 4 8 cm  Volume 166 0 mL Right kidney Normal echogenicity and contour    Please note that the sonographer noted echogenic cortex, but cortex is equal to the adjacent liver, within normal limits  No mass is identified  No hydronephrosis  No shadowing calculi  No perinephric fluid collections  Left kidney Normal echogenicity and contour  Please note that the sonographer noted echogenic cortex, but the cortex is equal to the visualized adjacent spleen and the right kidney, within normal limits  1 0 cm homogeneous, circumscribed exophytic nodule in the lower pole cortex, similar echogenicity to adjacent perinephric fat  No fatty nodule seen on the CT  No internal vascularity with color Doppler interrogation  Not demonstrated on the prior ultrasound  4 5 cm simple exophytic cortical cyst at the midpole  No hydronephrosis  No shadowing calculi  No perinephric fluid collections  URETERS: Nonvisualized  BLADDER: Normally distended  No focal thickening or mass lesions  Bilateral ureteral jets detected  Significant findings sent by [address] [place] [address] Highlight  Impression: Small echogenic left kidney nodule  May be a complicated cyst   If the patient is able, dedicated CT renal protocol or MR is recommended  Otherwise short interval follow-up can be performed  Workstation performed: [id number]     Echo complete w/ contrast if indicated    Result Date: 4/19/2022  Narrative: [place]  Left Ventricle: Left ventricular cavity size is normal  There is mild concentric hypertrophy  The left ventricular ejection fraction is 60%  Systolic function is normal  Wall motion is normal    Aortic Valve: The aortic valve is trileaflet  The leaflets are moderately calcified  There is mildly reduced mobility  There is minimal stenosis  The aortic valve peak velocity is 1 9 m/s  No significant pericardial fluid is seen  Labs and pertinent reports reviewed  This note has been generated by voice recognition software system  Therefore, there may be spelling, grammar, and or syntax errors  Please contact if questions arise

## 2022-04-20 LAB
ALBUMIN SERPL BCP-MCNC: 3.3 G/DL (ref 3.5–5)
ALP SERPL-CCNC: 42 U/L (ref 34–104)
ALT SERPL W P-5'-P-CCNC: 16 U/L (ref 7–52)
ANION GAP SERPL CALCULATED.3IONS-SCNC: 8 MMOL/L (ref 4–13)
APTT PPP: 72 SECONDS (ref 23–37)
APTT SCREEN TO CONFIRM RATIO: 0.99 RATIO (ref 0–1.34)
AST SERPL W P-5'-P-CCNC: 11 U/L (ref 13–39)
BASOPHILS # BLD AUTO: 0.07 THOUSANDS/ΜL (ref 0–0.1)
BASOPHILS NFR BLD AUTO: 1 % (ref 0–1)
BILIRUB SERPL-MCNC: 0.49 MG/DL (ref 0.2–1)
BUN SERPL-MCNC: 25 MG/DL (ref 5–25)
CALCIUM ALBUM COR SERPL-MCNC: 9.6 MG/DL (ref 8.3–10.1)
CALCIUM SERPL-MCNC: 9 MG/DL (ref 8.4–10.2)
CEA SERPL-MCNC: <0.5 NG/ML (ref 0–3)
CHLORIDE SERPL-SCNC: 103 MMOL/L (ref 96–108)
CO2 SERPL-SCNC: 28 MMOL/L (ref 21–32)
CONFIRM APTT/NORMAL: 48.2 SEC (ref 0–47.6)
CREAT SERPL-MCNC: 1.33 MG/DL (ref 0.6–1.3)
CRP SERPL QL: 213.3 MG/L
DRVVT IMM 1:2 NP PPP: 45.4 SEC (ref 0–40.4)
DRVVT SCREEN TO CONFIRM RATIO: 1.3 RATIO (ref 0.8–1.2)
EOSINOPHIL # BLD AUTO: 0.72 THOUSAND/ΜL (ref 0–0.61)
EOSINOPHIL NFR BLD AUTO: 7 % (ref 0–6)
ERYTHROCYTE [DISTWIDTH] IN BLOOD BY AUTOMATED COUNT: 12.5 % (ref 11.6–15.1)
ERYTHROCYTE [SEDIMENTATION RATE] IN BLOOD: 74 MM/HOUR (ref 0–19)
FOLATE SERPL-MCNC: >20 NG/ML (ref 3.1–17.5)
GFR SERPL CREATININE-BSD FRML MDRD: 49 ML/MIN/1.73SQ M
GLUCOSE SERPL-MCNC: 111 MG/DL (ref 65–140)
HCT VFR BLD AUTO: 41.2 % (ref 36.5–49.3)
HGB BLD-MCNC: 14.5 G/DL (ref 12–17)
IGA SERPL-MCNC: 186 MG/DL (ref 70–400)
IGG SERPL-MCNC: 753 MG/DL (ref 700–1600)
IGM SERPL-MCNC: 183 MG/DL (ref 40–230)
IMM GRANULOCYTES # BLD AUTO: 0.05 THOUSAND/UL (ref 0–0.2)
IMM GRANULOCYTES NFR BLD AUTO: 1 % (ref 0–2)
LA PPP-IMP: ABNORMAL
LDH SERPL-CCNC: 186 U/L (ref 140–271)
LYMPHOCYTES # BLD AUTO: 1.65 THOUSANDS/ΜL (ref 0.6–4.47)
LYMPHOCYTES NFR BLD AUTO: 15 % (ref 14–44)
MCH RBC QN AUTO: 33.3 PG (ref 26.8–34.3)
MCHC RBC AUTO-ENTMCNC: 35.2 G/DL (ref 31.4–37.4)
MCV RBC AUTO: 95 FL (ref 82–98)
MONOCYTES # BLD AUTO: 1.16 THOUSAND/ΜL (ref 0.17–1.22)
MONOCYTES NFR BLD AUTO: 11 % (ref 4–12)
NEUTROPHILS # BLD AUTO: 7.38 THOUSANDS/ΜL (ref 1.85–7.62)
NEUTS SEG NFR BLD AUTO: 65 % (ref 43–75)
NRBC BLD AUTO-RTO: 0 /100 WBCS
PLATELET # BLD AUTO: 194 THOUSANDS/UL (ref 149–390)
PMV BLD AUTO: 10.4 FL (ref 8.9–12.7)
POTASSIUM SERPL-SCNC: 3.6 MMOL/L (ref 3.5–5.3)
PROT S ACT/NOR PPP: 72 % (ref 61–136)
PROT S PPP-ACNC: 123 % (ref 60–150)
PROT SERPL-MCNC: 6.7 G/DL (ref 6.4–8.4)
PSA SERPL-MCNC: 4.2 NG/ML (ref 0–4)
RBC # BLD AUTO: 4.36 MILLION/UL (ref 3.88–5.62)
SCREEN APTT: 36.6 SEC (ref 0–51.9)
SCREEN DRVVT: 60 SEC (ref 0–47)
SODIUM SERPL-SCNC: 139 MMOL/L (ref 135–147)
THROMBIN TIME: 15.1 SEC (ref 0–23)
URATE SERPL-MCNC: 7.5 MG/DL (ref 3.5–8.5)
VIT B12 SERPL-MCNC: 815 PG/ML (ref 100–900)
WBC # BLD AUTO: 11.03 THOUSAND/UL (ref 4.31–10.16)

## 2022-04-20 PROCEDURE — 97116 GAIT TRAINING THERAPY: CPT

## 2022-04-20 PROCEDURE — 82378 CARCINOEMBRYONIC ANTIGEN: CPT | Performed by: NURSE PRACTITIONER

## 2022-04-20 PROCEDURE — 85652 RBC SED RATE AUTOMATED: CPT | Performed by: NURSE PRACTITIONER

## 2022-04-20 PROCEDURE — 97530 THERAPEUTIC ACTIVITIES: CPT

## 2022-04-20 PROCEDURE — 83615 LACTATE (LD) (LDH) ENZYME: CPT | Performed by: NURSE PRACTITIONER

## 2022-04-20 PROCEDURE — 84550 ASSAY OF BLOOD/URIC ACID: CPT | Performed by: NURSE PRACTITIONER

## 2022-04-20 PROCEDURE — 82784 ASSAY IGA/IGD/IGG/IGM EACH: CPT | Performed by: NURSE PRACTITIONER

## 2022-04-20 PROCEDURE — 84165 PROTEIN E-PHORESIS SERUM: CPT | Performed by: NURSE PRACTITIONER

## 2022-04-20 PROCEDURE — 85730 THROMBOPLASTIN TIME PARTIAL: CPT | Performed by: INTERNAL MEDICINE

## 2022-04-20 PROCEDURE — 82607 VITAMIN B-12: CPT | Performed by: NURSE PRACTITIONER

## 2022-04-20 PROCEDURE — 85025 COMPLETE CBC W/AUTO DIFF WBC: CPT | Performed by: NURSE PRACTITIONER

## 2022-04-20 PROCEDURE — 84153 ASSAY OF PSA TOTAL: CPT | Performed by: NURSE PRACTITIONER

## 2022-04-20 PROCEDURE — 80053 COMPREHEN METABOLIC PANEL: CPT | Performed by: NURSE PRACTITIONER

## 2022-04-20 PROCEDURE — 82232 ASSAY OF BETA-2 PROTEIN: CPT | Performed by: NURSE PRACTITIONER

## 2022-04-20 PROCEDURE — 82746 ASSAY OF FOLIC ACID SERUM: CPT | Performed by: NURSE PRACTITIONER

## 2022-04-20 PROCEDURE — 84165 PROTEIN E-PHORESIS SERUM: CPT | Performed by: PATHOLOGY

## 2022-04-20 PROCEDURE — 83521 IG LIGHT CHAINS FREE EACH: CPT | Performed by: NURSE PRACTITIONER

## 2022-04-20 PROCEDURE — 86140 C-REACTIVE PROTEIN: CPT | Performed by: NURSE PRACTITIONER

## 2022-04-20 PROCEDURE — 99233 SBSQ HOSP IP/OBS HIGH 50: CPT

## 2022-04-20 RX ADMIN — POLYETHYLENE GLYCOL 3350 17 G: 17 POWDER, FOR SOLUTION ORAL at 18:27

## 2022-04-20 RX ADMIN — OMEGA-3 FATTY ACIDS CAP 1000 MG 2000 MG: 1000 CAP at 08:25

## 2022-04-20 RX ADMIN — HYDRALAZINE HYDROCHLORIDE 10 MG: 20 INJECTION INTRAMUSCULAR; INTRAVENOUS at 20:26

## 2022-04-20 RX ADMIN — OSELTAMIVIR PHOSPHATE 30 MG: 30 CAPSULE ORAL at 08:26

## 2022-04-20 RX ADMIN — OXYCODONE HYDROCHLORIDE AND ACETAMINOPHEN 500 MG: 500 TABLET ORAL at 08:25

## 2022-04-20 RX ADMIN — HEPARIN SODIUM 17 UNITS/KG/HR: 10000 INJECTION, SOLUTION INTRAVENOUS at 05:34

## 2022-04-20 RX ADMIN — PRAVASTATIN SODIUM 20 MG: 20 TABLET ORAL at 18:19

## 2022-04-20 RX ADMIN — DILTIAZEM HYDROCHLORIDE 240 MG: 240 CAPSULE, EXTENDED RELEASE ORAL at 08:25

## 2022-04-20 RX ADMIN — APIXABAN 5 MG: 5 TABLET, FILM COATED ORAL at 18:19

## 2022-04-20 RX ADMIN — OSELTAMIVIR PHOSPHATE 30 MG: 30 CAPSULE ORAL at 20:26

## 2022-04-20 RX ADMIN — CHOLECALCIFEROL TAB 10 MCG (400 UNIT) 800 UNITS: 10 TAB at 08:25

## 2022-04-20 NOTE — PLAN OF CARE
Problem: Potential for Falls  Goal: Patient will remain free of falls  Description: INTERVENTIONS:  - Educate patient/family on patient safety including physical limitations  - Instruct patient to call for assistance with activity   - Consult OT/PT to assist with strengthening/mobility   - Keep Call bell within reach  - Keep bed low and locked with side rails adjusted as appropriate  - Keep care items and personal belongings within reach  - Initiate and maintain comfort rounds  - Make Fall Risk Sign visible to staff  - Offer Toileting every 2 Hours, in advance of need  - Initiate/Maintain bed alarm  - Obtain necessary fall risk management equipment: non skid footwear, yellow bracelet  - Apply yellow socks and bracelet for high fall risk patients  - Consider moving patient to room near nurses station  Outcome: Progressing     Problem: RESPIRATORY - ADULT  Goal: Achieves optimal ventilation and oxygenation  Description: INTERVENTIONS:  - Assess for changes in respiratory status  - Assess for changes in mentation and behavior  - Position to facilitate oxygenation and minimize respiratory effort  - Oxygen administered by appropriate delivery if ordered  - Initiate smoking cessation education as indicated  - Encourage broncho-pulmonary hygiene including cough, deep breathe, Incentive Spirometry  - Assess the need for suctioning and aspirate as needed  - Assess and instruct to report SOB or any respiratory difficulty  - Respiratory Therapy support as indicated  Outcome: Progressing     Problem: SKIN/TISSUE INTEGRITY - ADULT  Goal: Skin Integrity remains intact(Skin Breakdown Prevention)  Description: Assess:  -Perform Flo assessment  -Clean and moisturize skin   -Inspect skin when repositioning, toileting, and assisting with ADLS  -Assess under medical devices   -Assess extremities for adequate circulation and sensation     Bed Management:  -Have minimal linens on bed & keep smooth, unwrinkled  -Change linens as needed when moist or perspiring  -Avoid sitting or lying in one position while in bed  -Keep HOB at 30 degrees     Toileting:  -Offer bedside commode  -Assess for incontinence  -Use incontinent care products after each incontinent episode     Activity:  -Mobilize patient   -Encourage activity and walks on unit  -Encourage or provide ROM exercises   -Turn and reposition patient   -Use appropriate equipment to lift or move patient in bed  -Instruct/ Assist with weight shifting   -Consider limitation of chair time     Skin Care:  -Avoid use of baby powder, tape, friction and shearing, hot water or constrictive clothing  -Relieve pressure over bony prominences   -Do not massage red bony areas    Next Steps:  -Teach patient strategies to minimize risks   -Consider consults to  interdisciplinary teams  Outcome: Progressing     Problem: MUSCULOSKELETAL - ADULT  Goal: Maintain or return mobility to safest level of function  Description: INTERVENTIONS:  - Assess patient's ability to carry out ADLs; assess patient's baseline for ADL function and identify physical deficits which impact ability to perform ADLs (bathing, care of mouth/teeth, toileting, grooming, dressing, etc )  - Assess/evaluate cause of self-care deficits   - Assess range of motion  - Assess patient's mobility  - Assess patient's need for assistive devices and provide as appropriate  - Encourage maximum independence but intervene and supervise when necessary  - Involve family in performance of ADLs  - Assess for home care needs following discharge   - Consider OT consult to assist with ADL evaluation and planning for discharge  - Provide patient education as appropriate  Outcome: Progressing     Problem: PAIN - ADULT  Goal: Verbalizes/displays adequate comfort level or baseline comfort level  Description: Interventions:  - Encourage patient to monitor pain and request assistance  - Assess pain using appropriate pain scale  - Administer analgesics based on type and severity of pain and evaluate response  - Implement non-pharmacological measures as appropriate and evaluate response  - Consider cultural and social influences on pain and pain management  - Notify physician/advanced practitioner if interventions unsuccessful or patient reports new pain  Outcome: Progressing     Problem: INFECTION - ADULT  Goal: Absence or prevention of progression during hospitalization  Description: INTERVENTIONS:  - Assess and monitor for signs and symptoms of infection  - Monitor lab/diagnostic results  - Monitor all insertion sites, i e  indwelling lines, tubes, and drains  - Monitor endotracheal if appropriate and nasal secretions for changes in amount and color  - [place] appropriate cooling/warming therapies per order  - Administer medications as ordered  - Instruct and encourage patient and family to use good hand hygiene technique  - Identify and instruct in appropriate isolation precautions for identified infection/condition  Outcome: Progressing     Problem: SAFETY ADULT  Goal: Patient will remain free of falls  Description: INTERVENTIONS:  - Educate patient/family on patient safety including physical limitations  - Instruct patient to call for assistance with activity   - Consult OT/PT to assist with strengthening/mobility   - Keep Call bell within reach  - Keep bed low and locked with side rails adjusted as appropriate  - Keep care items and personal belongings within reach  - Initiate and maintain comfort rounds  - Make Fall Risk Sign visible to staff  - Offer Toileting every 2 Hours, in advance of need  - Initiate/Maintain bed alarm  - Obtain necessary fall risk management equipment: non skid footwear, yellow bracelet  - Apply yellow socks and bracelet for high fall risk patients  - Consider moving patient to room near nurses station  Outcome: Progressing  Goal: Maintain or return to baseline ADL function  Description: INTERVENTIONS:  - Educate patient/family on patient safety including physical limitations  - Instruct patient to call for assistance with activity   - Consult OT/PT to assist with strengthening/mobility   - Keep Call bell within reach  - Keep bed low and locked with side rails adjusted as appropriate  - Keep care items and personal belongings within reach  - Initiate and maintain comfort rounds  - Make Fall Risk Sign visible to staff  - Offer Toileting every 2 Hours, in advance of need  - Initiate/Maintain bed alarm  - Obtain necessary fall risk management equipment: yellow bracelet/yellow socks  - Apply yellow socks and bracelet for high fall risk patients  - Consider moving patient to room near nurses station  Outcome: Progressing  Goal: Maintains/Returns to pre admission functional level  Description: INTERVENTIONS:  - Perform BMAT or MOVE assessment daily    - Set and communicate daily mobility goal to care team and patient/family/caregiver     - Collaborate with rehabilitation services on mobility goals if consulted  - Perform Range of Motion  - Reposition patient   - Dangle patient   - Stand patient   - Ambulate patient  - Out of bed to chair   - Out of bed for meals  - Out of bed for toileting  - Record patient progress and toleration of activity level   Outcome: Progressing     Problem: DISCHARGE PLANNING  Goal: Discharge to home or other facility with appropriate resources  Description: INTERVENTIONS:  - Identify barriers to discharge w/patient and caregiver  - Arrange for needed discharge resources and transportation as appropriate  - Identify discharge learning needs (meds, wound care, etc )  - Arrange for interpretive services to assist at discharge as needed  - Refer to Case Management Department for coordinating discharge planning if the patient needs post-hospital services based on physician/advanced practitioner order or complex needs related to functional status, cognitive ability, or social support system  Outcome: Progressing     Problem: Knowledge Deficit  Goal: Patient/family/caregiver demonstrates understanding of disease process, treatment plan, medications, and discharge instructions  Description: Complete learning assessment and assess knowledge base  Interventions:  - Provide teaching at level of understanding  - Provide teaching via preferred learning methods  Outcome: Progressing     Problem: MOBILITY - ADULT  Goal: Maintain or return to baseline ADL function  Description: INTERVENTIONS:  - Educate patient/family on patient safety including physical limitations  - Instruct patient to call for assistance with activity   - Consult OT/PT to assist with strengthening/mobility   - Keep Call bell within reach  - Keep bed low and locked with side rails adjusted as appropriate  - Keep care items and personal belongings within reach  - Initiate and maintain comfort rounds  - Make Fall Risk Sign visible to staff  - Offer Toileting every 2 Hours, in advance of need  - Initiate/Maintain bed alarm  - Obtain necessary fall risk management equipment: yellow bracelet/yellow socks  - Apply yellow socks and bracelet for high fall risk patients  - Consider moving patient to room near nurses station  Outcome: Progressing  Goal: Maintains/Returns to pre admission functional level  Description: INTERVENTIONS:  - Perform BMAT or MOVE assessment daily    - Set and communicate daily mobility goal to care team and patient/family/caregiver     - Collaborate with rehabilitation services on mobility goals if consulted  - Perform Range of Motion   - Reposition patient   - Dangle patient   - Stand patient   - Ambulate patient   - Out of bed to chair  - Out of bed for meals   - Out of bed for toileting  - Record patient progress and toleration of activity level   Outcome: Progressing     Problem: Prexisting or High Potential for Compromised Skin Integrity  Goal: Skin integrity is maintained or improved  Description: INTERVENTIONS:  - Identify patients at risk for skin breakdown  - Assess and monitor skin integrity  - Assess and monitor nutrition and hydration status  - Monitor labs   - Assess for incontinence   - Turn and reposition patient  - Assist with mobility/ambulation  - Relieve pressure over bony prominences  - Avoid friction and shearing  - Provide appropriate hygiene as needed including keeping skin clean and dry  - Evaluate need for skin moisturizer/barrier cream  - Collaborate with interdisciplinary team   - Patient/family teaching  - Consider wound care consult   Outcome: Progressing

## 2022-04-20 NOTE — PLAN OF CARE
Problem: PHYSICAL THERAPY ADULT  Goal: Performs mobility at highest level of function for planned discharge setting  See evaluation for individualized goals  Description: Treatment/Interventions: LE strengthening/ROM,Elevations,Therapeutic exercise,Endurance training,Patient/family training,Gait training,Spoke to nursing  Equipment Recommended:  (TBD pending progress)       See flowsheet documentation for full assessment, interventions and recommendations  Outcome: Progressing  Note: Prognosis: Good  Problem List: Decreased strength,Decreased endurance,Impaired balance,Decreased mobility  Assessment: Pt seen for PT treatment session this date, consisting of ther act focused on functional transfer training and gt training on level surfaces to improve pt safety in household environment  Since previous session, pt has made excellent progress in terms of progression of OOB mobility tolerance, with minimal SOB noted during prolonged activity  Pertinent barriers during this session include SOB  Current goals and POC remain appropriate, pt continues to have rehab potential and is making progress towards STGs  Pt prognosis for achieving goals is good, pending pt progress with hospitalization/medical status improvements, and indicated by ability to solve problems, motivation to walk, talk, and achieve self-help skills and recent history of independence with functional skills/High PLOF  PT recommends home with outpatient rehabilitation upon discharge  Pt continues to be functioning below baseline level, and remains limited 2* factors listed above  PT will continue to see pt during current hospitalization in order to address the deficits listed above and provide interventions consistent w/ POC in effort to achieve STGs  Barriers to Discharge: None        PT Discharge Recommendation: Home with outpatient rehabilitation          See flowsheet documentation for full assessment

## 2022-04-20 NOTE — PROGRESS NOTES
[address]  Progress Note - Clemetine Law 1940, 78 y o  male MRN: [id number]  Unit/Bed#: ICU 03-01 Encounter: [id number]  Primary Care Provider: Karina Parker DO   Date and time admitted to hospital: 4/18/2022 10:04 AM    * Pulmonary embolus [facility])  Assessment & Plan  · Patient has history of right lower extremity DVT  · CT C/A/P obtained in the ED revealing large pulmonary embolism in the right lower lobe with associated infiltrate  Some right heart strain could be present at the RV LV ratio being approximately 1 although judged with standard CT imaging  Possible DVT in the left femoral vein  · Venous duplex revealed evidence of chronic deep vein thrombosis in the proximal and mid femoral vein  · Concern for possible malignancy underlying as CT chest, abdomen and pelvis with contrast shows fractures of the left 10th and 11th ribs suggesting possible pathologic fracture  · Hematology/oncology consult appreciated- work-up pending, ok for dc, will need OP follow-up  · Echocardiogram without evidence of heart strain or pericardial fluid  · Will transition heparin gtt to Eliquis    Sepsis [facility])  Assessment & Plan  · The patient meets SIRS criteria with tachypnea, leukocytosis, and fever with underlying infection of influenza A    · Unlikely underlying bacterial pneumonia given negative procalcitonin, strep/legionella urine antigens   · Received IV cefepime x1 dose in the ED  Azithromycin added x1 day  Will discontinue antibiotics at this time, given this is unlikely bacterial pneumonia  · Continue treatment with Tamiflu  · Leukocytosis improving, continue to trend a m  CBC    Influenza A  Assessment & Plan  · With acute respiratory failure hypoxia; SpO2 of 88% on room air  · Continue Tamiflu 30 mg b i d  3/5 days  · Supportive care  · Respiratory protocol  · Titrated off of oxygen as tolerated  PATRICK (acute kidney injury) ([facility] [address] )  Assessment & Plan  · POA    Presented with creatinine of 1 62  No baseline creatinine; however, previous level- 1 24 mg/dL  · Improved to 1 33   · Patient's preadmission lisinopril and HCTZ on hold in the setting of PATRICK  · Renal ultrasound: Small echogenic left kidney nodule  May be a complicated cyst   If the patient is able, dedicated CT renal protocol or MR is recommended  Otherwise short interval follow-up can be performed  · Check PVR  · Continue gentle IV fluid  · Continue to monitor renal function, avoid hypotension and nephrotoxins    Abnormal CT scan  Assessment & Plan  · CT chest, abdomen and pelvis shows:  · Fractures of the left 10th and 11th ribs possible pathologic fracture- will obtain bone scan  · Follow-up bone scan suggests traumatic rather than pathologic fracture; however, will need OP heme/onc follow-up upon discharge  · Scattered renal cyst 1 of which is mildly hyperdense measuring 1 1 cm- will obtain renal ultrasound  · Mild small bowel dilatation without obvious of transition likely related to ileus follow-up may be helpful if symptoms should persist   · Admitting provider discussed case with with surgery on-call - as the patient has no symptoms of nausea, vomiting at this time is okay for conservative management at this time  · KUB revealed no evidence of obstruction   · If worsening symptoms will need to consult surgery     Essential hypertension  Assessment & Plan  · Blood pressure noted to be elevated  · Continue to hold off on lisinopril and hydrochlorothiazide at this time due to PATRICK  · Continue with diltiazem  · p r n  Hydralazine to help with blood pressure control      VTE Pharmacologic Prophylaxis: VTE Score: 4 Moderate Risk (Score 3-4) - Pharmacological DVT Prophylaxis Ordered: apixaban (Eliquis)  Patient Centered Rounds: I performed bedside rounds with nursing staff today    Discussions with Specialists or Other Care Team Provider:  Case Management, nursing, Hematology/Oncology    Education and Discussions with Family / Patient: Patient declined call to   Time Spent for Care: 30 minutes  More than 50% of total time spent on counseling and coordination of care as described above  Current Length of Stay: 2 day(s)  Current Patient Status: Inpatient   Certification Statement: The patient will continue to require additional inpatient hospital stay due to PE requiring transition of heparin to Eliquis  Discharge Plan: Anticipate discharge tomorrow to home  Code Status: Level 1 - Full Code    Subjective:   Patient seen and examined resting comfortably in bed, in no apparent distress  No acute events overnight  Patient states he has not yet had a bowel movement, but feels like he can go soon  Objective:     Vitals:   Temp (24hrs), Av 2 °F (36 8 °C), Min:97 8 °F (36 6 °C), Max:98 5 °F (36 9 °C)    Temp:  [97 8 °F (36 6 °C)-98 5 °F (36 9 °C)] 97 8 °F (36 6 °C)  HR:  [59-80] 59  Resp:  [21-40] 21  BP: (110-165)/(55-77) 141/64  SpO2:  [87 %-95 %] 93 %  Body mass index is 26 48 kg/m²  Input and Output Summary (last 24 hours): Intake/Output Summary (Last 24 hours) at 2022 1351  Last data filed at 2022 1200  Gross per 24 hour   Intake 846 27 ml   Output 750 ml   Net 96 27 ml       Physical Exam:   Physical Exam  Vitals and nursing note reviewed  Constitutional:       General: He is not in acute distress  Appearance: He is normal weight  He is not toxic-appearing  HENT:      Head: Normocephalic and atraumatic  Mouth/Throat:      Mouth: Mucous membranes are moist    Eyes:      Conjunctiva/sclera: Conjunctivae normal    Cardiovascular:      Rate and Rhythm: Normal rate and regular rhythm  Pulses: Normal pulses  Heart sounds: Normal heart sounds  Pulmonary:      Effort: Pulmonary effort is normal  No respiratory distress  Breath sounds: Normal breath sounds  No wheezing, rhonchi or rales     Abdominal:      General: Bowel sounds are normal  There is no distension  Palpations: Abdomen is soft  Tenderness: There is no abdominal tenderness  Musculoskeletal:      Cervical back: Neck supple  Right lower leg: No edema  Left lower leg: No edema  Skin:     General: Skin is warm and dry  Neurological:      Mental Status: He is alert and oriented to person, place, and time  Cranial Nerves: No cranial nerve deficit  Sensory: No sensory deficit  Motor: No weakness  Coordination: Coordination normal    Psychiatric:         Mood and Affect: Mood normal          Behavior: Behavior normal          Thought Content: Thought content normal           Additional Data:     Labs:  Results from last 7 days   Lab Units 04/20/22  0455   WBC Thousand/uL 11 03*   HEMOGLOBIN g/dL 14 5   HEMATOCRIT % 41 2   PLATELETS Thousands/uL 194   NEUTROS PCT % 65   LYMPHS PCT % 15   MONOS PCT % 11   EOS PCT % 7*     Results from last 7 days   Lab Units 04/20/22  0455   SODIUM mmol/L 139   POTASSIUM mmol/L 3 6   CHLORIDE mmol/L 103   CO2 mmol/L 28   BUN mg/dL 25   CREATININE mg/dL 1 33*   ANION GAP mmol/L 8   CALCIUM mg/dL 9 0   ALBUMIN g/dL 3 3*   TOTAL BILIRUBIN mg/dL 0 49   ALK PHOS U/L 42   ALT U/L 16   AST U/L 11*   GLUCOSE RANDOM mg/dL 111     Results from last 7 days   Lab Units 04/18/22  1315   INR  1 21*             Results from last 7 days   Lab Units 04/19/22  0455 04/18/22  1027   LACTIC ACID mmol/L  --  1 1   PROCALCITONIN ng/ml 0 13 0 10       Lines/Drains:  Invasive Devices  Report    Peripheral Intravenous Line            Peripheral IV 04/18/22 Right Antecubital 2 days    Peripheral IV 04/18/22 Dorsal (posterior); Right;Distal Forearm 1 day                      Imaging: Reviewed radiology reports from this admission including: bone scan, venous duplex, ECHO    Recent Cultures (last 7 days):   Results from last 7 days   Lab Units 04/18/22  1541 04/18/22  1042 04/18/22  1027   BLOOD CULTURE   --  No Growth at 24 hrs  No Growth at 24 hrs  LEGIONELLA URINARY ANTIGEN  Negative  --   --        Last 24 Hours Medication List:   Current Facility-Administered Medications   Medication Dose Route Frequency Provider Last Rate    acetaminophen  650 mg Oral Q6H PRN Kim Cesilia, CRNP      albuterol  2 5 mg Nebulization Q4H PRN Logan Ulrich DO      apixaban  5 mg Oral BID Otto Tran PA-C      ascorbic acid  500 mg Oral Daily Kim Cesilia, CRNP      cholecalciferol  800 Units Oral Daily Kim Cesilia, CRNP      diltiazem  240 mg Oral Daily Kim Cesilia, CRNP      fish oil  2,000 mg Oral Daily Kim Cesilia, CRNP      hydrALAZINE  10 mg Intravenous Q6H PRN Kim Cesilia, CRNP      oseltamivir  30 mg Oral Q12H [facility] Kim Cesilia, CRNP      polyethylene glycol  17 g Oral Daily PRN Azzie Haff, CRNP      pravastatin  20 mg Oral Daily With [address], CRNP          Today, Patient Was Seen By: Otto Tran PA-C    **Please Note: This note may have been constructed using a voice recognition system  **

## 2022-04-20 NOTE — ASSESSMENT & PLAN NOTE
· With acute respiratory failure hypoxia; SpO2 of 88% on room air  · Continue Tamiflu 30 mg b i d  3/5 days  · Supportive care  · Respiratory protocol  · Titrated off of oxygen as tolerated

## 2022-04-20 NOTE — ASSESSMENT & PLAN NOTE
· Patient has history of right lower extremity DVT  · CT C/A/P obtained in the ED revealing large pulmonary embolism in the right lower lobe with associated infiltrate  Some right heart strain could be present at the RV LV ratio being approximately 1 although judged with standard CT imaging  Possible DVT in the left femoral vein  · Venous duplex revealed evidence of chronic deep vein thrombosis in the proximal and mid femoral vein  · Concern for possible malignancy underlying as CT chest, abdomen and pelvis with contrast shows fractures of the left 10th and 11th ribs suggesting possible pathologic fracture     · Hematology/oncology consult appreciated- work-up pending, ok for dc, will need OP follow-up  · Echocardiogram without evidence of heart strain or pericardial fluid  · Will transition heparin gtt to Eliquis

## 2022-04-20 NOTE — CASE MANAGEMENT
Case Management Discharge Planning Note    Patient name Brandi Friend  Location ICU 03/ICU - MRN [id number]  : 1940 Date 2022       Current Admission Date: 2022  Current Admission Diagnosis:Pulmonary embolus [facility])   Patient Active Problem List    Diagnosis Date Noted    [facility] 2022    Influenza A 2022    PATRICK [facility]acute kidney injury) [facility] ) 2022    Abnormal CT scan 2022    Sepsis ([facility] ) 2022    Medicare annual wellness visit, subsequent 2022    Peripheral vascular disease with claudication ([facility] ) 2022    Hyperglycemia 2021    Tremor of both hands 2021    BMI 28 0-28 9,adult 2021    Essential hypertension     Hyperlipidemia     History of deep venous thrombosis (DVT) of distal vein of right lower extremity     Trochanteric bursitis of left hip 10/05/2020      LOS (days): 2  Geometric Mean LOS (GMLOS) (days): 4 80  Days to GMLOS:2 8     OBJECTIVE:  Risk of Unplanned Readmission Score: 12     Current admission status: Inpatient   Preferred Pharmacy:   [address] [place] [address] [address]  [address] [place] [id number]  Phone: [phone] Fax: [phone]    Primary Care Provider: Mariza Michelle DO    Primary Insurance: [address] [place] [address]  Secondary Insurance:     DISCHARGE DETAILS:  Freedom of Choice: Yes  Comments - Freedom of Choice: Declines any outpt PT   Reviewed the IMM with the pt who was agreeable to same  Spoke to pt about outpt PT, pt declined same                                                                               IMM Given (Date):: 22  IMM Given to[de-identified] Patient

## 2022-04-20 NOTE — PLAN OF CARE
Problem: MUSCULOSKELETAL - ADULT  Goal: Maintain or return mobility to safest level of function  Description: INTERVENTIONS:  - Assess patient's ability to carry out ADLs; assess patient's baseline for ADL function and identify physical deficits which impact ability to perform ADLs (bathing, care of mouth/teeth, toileting, grooming, dressing, etc )  - Assess/evaluate cause of self-care deficits   - Assess range of motion  - Assess patient's mobility  - Assess patient's need for assistive devices and provide as appropriate  - Encourage maximum independence but intervene and supervise when necessary  - Involve family in performance of ADLs  - Assess for home care needs following discharge   - Consider OT consult to assist with ADL evaluation and planning for discharge  - Provide patient education as appropriate  Outcome: Progressing     Problem: SKIN/TISSUE INTEGRITY - ADULT  Goal: Skin Integrity remains intact(Skin Breakdown Prevention)  Description: Assess:  -Perform Flo assessment  -Clean and moisturize skin   -Inspect skin when repositioning, toileting, and assisting with ADLS  -Assess under medical devices   -Assess extremities for adequate circulation and sensation     Bed Management:  -Have minimal linens on bed & keep smooth, unwrinkled  -Change linens as needed when moist or perspiring  -Avoid sitting or lying in one position while in bed  -Keep HOB at 30 degrees     Toileting:  -Offer bedside commode  -Assess for incontinence  -Use incontinent care products after each incontinent episode     Activity:  -Mobilize patient   -Encourage activity and walks on unit  -Encourage or provide ROM exercises   -Turn and reposition patient   -Use appropriate equipment to lift or move patient in bed  -Instruct/ Assist with weight shifting   -Consider limitation of chair time     Skin Care:  -Avoid use of baby powder, tape, friction and shearing, hot water or constrictive clothing  -Relieve pressure over bony prominences   -Do not massage red bony areas    Next Steps:  -Teach patient strategies to minimize risks   -Consider consults to  interdisciplinary teams  Outcome: Progressing

## 2022-04-20 NOTE — ASSESSMENT & PLAN NOTE
· CT chest, abdomen and pelvis shows:  · Fractures of the left 10th and 11th ribs possible pathologic fracture- will obtain bone scan  · Follow-up bone scan suggests traumatic rather than pathologic fracture; however, will need OP heme/onc follow-up upon discharge  · Scattered renal cyst 1 of which is mildly hyperdense measuring 1 1 cm- will obtain renal ultrasound  · Mild small bowel dilatation without obvious of transition likely related to ileus follow-up may be helpful if symptoms should persist   · Admitting provider discussed case with with surgery on-call - as the patient has no symptoms of nausea, vomiting at this time is okay for conservative management at this time      · KUB revealed no evidence of obstruction   · If worsening symptoms will need to consult surgery

## 2022-04-20 NOTE — PHYSICAL THERAPY NOTE
Physical Therapy Treatment Note       04/20/22 1026   PT Last Visit   PT Visit Date 04/20/22   Note Type   Note Type Treatment   Pain Assessment   Pain Assessment Tool 0-10   Pain Score No Pain   Restrictions/Precautions   Weight Bearing Precautions Per Order No   Other Precautions Droplet precautions;Contact/isolation; Fall Risk;Telemetry   General   Chart Reviewed Yes   Response to Previous Treatment Patient with no complaints from previous session  Family/Caregiver Present No   Cognition   Arousal/Participation Alert; Responsive; Cooperative   Attention Attends with cues to redirect   Orientation Level Oriented X4   Memory Within functional limits   Following Commands Follows all commands and directions without difficulty   Comments pt agreeable to PT session, motivated to participate   Subjective   Subjective "I feel good"   Bed Mobility   Supine to Sit Unable to assess  (pt received OOB in recliner)   Additional Comments Pt denied lightheaded/dizziness with transitional movements    Transfers   Sit to Stand 6  Modified independent   Additional items Assist x 1; Armrests   Stand to Sit 6  Modified independent   Additional items Assist x 1; Armrests   Additional Comments SpO2 on RA throughout mobility = 94-95% c + SOB noted by end of ambulation trial   Ambulation/Elevation   Gait pattern Improper Weight shift; Short stride   Gait Assistance 5  Supervision   Additional items Assist x 1   Assistive Device None   Distance 220 ft   Stair Management Assistance Not tested   Balance   Static Sitting Normal   Dynamic Sitting Good   Static Standing Good   Dynamic Standing Fair +   Ambulatory Fair +   Endurance Deficit   Endurance Deficit Yes   Activity Tolerance   Activity Tolerance Patient tolerated treatment well   Nurse Made Aware RN made aware of outcomes   Assessment   Prognosis Good   Problem List Decreased strength;Decreased endurance; Impaired balance;Decreased mobility   Assessment Pt seen for PT treatment session this date, consisting of ther act focused on functional transfer training and gt training on level surfaces to improve pt safety in household environment  Since previous session, pt has made excellent progress in terms of progression of OOB mobility tolerance, with minimal SOB noted during prolonged activity  Pertinent barriers during this session include SOB  Current goals and POC remain appropriate, pt continues to have rehab potential and is making progress towards STGs  Pt prognosis for achieving goals is good, pending pt progress with hospitalization/medical status improvements, and indicated by ability to solve problems, motivation to walk, talk, and achieve self-help skills and recent history of independence with functional skills/High PLOF  PT recommends home with outpatient rehabilitation upon discharge  Pt continues to be functioning below baseline level, and remains limited 2* factors listed above  PT will continue to see pt during current hospitalization in order to address the deficits listed above and provide interventions consistent w/ POC in effort to achieve STGs  Barriers to Discharge None   Goals   Patient Goals "to return home"   STG Expiration Date 04/29/22   Short Term Goal #1 goals remain appropriate   PT Treatment Day 1   Plan   Treatment/Interventions LE strengthening/ROM; Elevations; Therapeutic exercise; Endurance training;Patient/family training;Spoke to nursing;Spoke to case management   Progress Progressing toward goals   PT Frequency 3-5x/wk   Recommendation   PT Discharge Recommendation Home with outpatient rehabilitation   Equipment Recommended   (none at this time)   Additional Comments Pt's raw score on the -[facility] Basic Mobility inpatient short form is 22, standardized score is 47 4  Patients at this level are likely to benefit from DC to home with no services, however, please refer to therapist recommendation for safe DC planning     -PAC Basic Mobility Inpatient   Turning in Bed Without Bedrails 4   Lying on Back to Sitting on Edge of Flat Bed 4   Moving Bed to Chair 4   Standing Up From Chair 4   Walk in Room 3   Climb 3-5 Stairs 3   Basic Mobility Inpatient Raw Score 22   Basic Mobility Standardized Score 47 4   Highest Level Of Mobility   JH-HLM Goal 7: Walk 25 feet or more   JH-HLM Highest Level of Mobility 7: Walk 25 feet or more   JH-HLM Goal Achieved Yes   Education   Education Provided Mobility training   Patient Demonstrates acceptance/verbal understanding   End of Consult   Patient Position at End of Consult Bedside chair; All needs within reach       [facility], PT    Time of PT treatment session: 9694-4770 (total treatment time = 24 minutes)

## 2022-04-20 NOTE — ASSESSMENT & PLAN NOTE
· POA  Presented with creatinine of 1 62  No baseline creatinine; however, previous level- 1 24 mg/dL  · Improved to 1 33   · Patient's preadmission lisinopril and HCTZ on hold in the setting of PATRICK  · Renal ultrasound: Small echogenic left kidney nodule  May be a complicated cyst   If the patient is able, dedicated CT renal protocol or MR is recommended  Otherwise short interval follow-up can be performed    · Check PVR  · Continue gentle IV fluid  · Continue to monitor renal function, avoid hypotension and nephrotoxins

## 2022-04-21 ENCOUNTER — TELEPHONE (OUTPATIENT)
Dept: HEMATOLOGY ONCOLOGY | Facility: CLINIC | Age: 82
End: 2022-04-21

## 2022-04-21 VITALS
DIASTOLIC BLOOD PRESSURE: 65 MMHG | WEIGHT: 177.69 LBS | SYSTOLIC BLOOD PRESSURE: 156 MMHG | BODY MASS INDEX: 26.93 KG/M2 | RESPIRATION RATE: 20 BRPM | TEMPERATURE: 98.2 F | HEIGHT: 68 IN | HEART RATE: 64 BPM | OXYGEN SATURATION: 95 %

## 2022-04-21 LAB
ALBUMIN SERPL ELPH-MCNC: 3.15 G/DL (ref 3.5–5)
ALBUMIN SERPL ELPH-MCNC: 50.8 % (ref 52–65)
ALPHA1 GLOB SERPL ELPH-MCNC: 0.52 G/DL (ref 0.1–0.4)
ALPHA1 GLOB SERPL ELPH-MCNC: 8.4 % (ref 2.5–5)
ALPHA2 GLOB SERPL ELPH-MCNC: 1.14 G/DL (ref 0.4–1.2)
ALPHA2 GLOB SERPL ELPH-MCNC: 18.4 % (ref 7–13)
ANION GAP SERPL CALCULATED.3IONS-SCNC: 9 MMOL/L (ref 4–13)
APTT PPP: 35 SECONDS (ref 23–37)
BETA GLOB ABNORMAL SERPL ELPH-MCNC: 0.31 G/DL (ref 0.4–0.8)
BETA1 GLOB SERPL ELPH-MCNC: 5 % (ref 5–13)
BETA2 GLOB SERPL ELPH-MCNC: 6.2 % (ref 2–8)
BETA2+GAMMA GLOB SERPL ELPH-MCNC: 0.38 G/DL (ref 0.2–0.5)
BUN SERPL-MCNC: 21 MG/DL (ref 5–25)
CALCIUM SERPL-MCNC: 8.9 MG/DL (ref 8.4–10.2)
CHLORIDE SERPL-SCNC: 103 MMOL/L (ref 96–108)
CO2 SERPL-SCNC: 25 MMOL/L (ref 21–32)
CREAT SERPL-MCNC: 1.09 MG/DL (ref 0.6–1.3)
ERYTHROCYTE [DISTWIDTH] IN BLOOD BY AUTOMATED COUNT: 13 % (ref 11.6–15.1)
GAMMA GLOB ABNORMAL SERPL ELPH-MCNC: 0.69 G/DL (ref 0.5–1.6)
GAMMA GLOB SERPL ELPH-MCNC: 11.2 % (ref 12–22)
GFR SERPL CREATININE-BSD FRML MDRD: 62 ML/MIN/1.73SQ M
GLUCOSE SERPL-MCNC: 115 MG/DL (ref 65–140)
HCT VFR BLD AUTO: 41.7 % (ref 36.5–49.3)
HGB BLD-MCNC: 13.8 G/DL (ref 12–17)
IGG/ALB SER: 1.03 {RATIO} (ref 1.1–1.8)
KAPPA LC FREE SER-MCNC: 38.2 MG/L (ref 3.3–19.4)
KAPPA LC FREE/LAMBDA FREE SER: 1.13 {RATIO} (ref 0.26–1.65)
LAMBDA LC FREE SERPL-MCNC: 33.8 MG/L (ref 5.7–26.3)
MCH RBC QN AUTO: 32.9 PG (ref 26.8–34.3)
MCHC RBC AUTO-ENTMCNC: 33.1 G/DL (ref 31.4–37.4)
MCV RBC AUTO: 100 FL (ref 82–98)
PLATELET # BLD AUTO: 254 THOUSANDS/UL (ref 149–390)
PMV BLD AUTO: 10.3 FL (ref 8.9–12.7)
POTASSIUM SERPL-SCNC: 3.7 MMOL/L (ref 3.5–5.3)
PROT C AG ACT/NOR PPP IA: 88 % OF NORMAL (ref 60–150)
PROT PATTERN SERPL ELPH-IMP: ABNORMAL
PROT S ACT/NOR PPP: 64 % (ref 71–117)
PROT SERPL-MCNC: 6.2 G/DL (ref 6.4–8.2)
RBC # BLD AUTO: 4.19 MILLION/UL (ref 3.88–5.62)
SODIUM SERPL-SCNC: 137 MMOL/L (ref 135–147)
WBC # BLD AUTO: 10.27 THOUSAND/UL (ref 4.31–10.16)

## 2022-04-21 PROCEDURE — 84166 PROTEIN E-PHORESIS/URINE/CSF: CPT | Performed by: PATHOLOGY

## 2022-04-21 PROCEDURE — 99239 HOSP IP/OBS DSCHRG MGMT >30: CPT

## 2022-04-21 PROCEDURE — 85730 THROMBOPLASTIN TIME PARTIAL: CPT | Performed by: PHYSICIAN ASSISTANT

## 2022-04-21 PROCEDURE — 80048 BASIC METABOLIC PNL TOTAL CA: CPT

## 2022-04-21 PROCEDURE — 97110 THERAPEUTIC EXERCISES: CPT

## 2022-04-21 PROCEDURE — 85027 COMPLETE CBC AUTOMATED: CPT

## 2022-04-21 PROCEDURE — 83521 IG LIGHT CHAINS FREE EACH: CPT | Performed by: NURSE PRACTITIONER

## 2022-04-21 PROCEDURE — 94760 N-INVAS EAR/PLS OXIMETRY 1: CPT

## 2022-04-21 PROCEDURE — 84166 PROTEIN E-PHORESIS/URINE/CSF: CPT | Performed by: NURSE PRACTITIONER

## 2022-04-21 RX ORDER — OSELTAMIVIR PHOSPHATE 30 MG/1
30 CAPSULE ORAL EVERY 12 HOURS SCHEDULED
Qty: 3 CAPSULE | Refills: 0 | Status: SHIPPED | OUTPATIENT
Start: 2022-04-21 | End: 2022-04-21

## 2022-04-21 RX ORDER — OSELTAMIVIR PHOSPHATE 30 MG/1
30 CAPSULE ORAL EVERY 12 HOURS SCHEDULED
Qty: 3 CAPSULE | Refills: 0 | Status: SHIPPED | OUTPATIENT
Start: 2022-04-21 | End: 2022-04-24

## 2022-04-21 RX ORDER — DILTIAZEM HYDROCHLORIDE 240 MG/1
240 CAPSULE, COATED, EXTENDED RELEASE ORAL DAILY
Qty: 30 CAPSULE | Refills: 0 | Status: CANCELLED | OUTPATIENT
Start: 2022-04-21

## 2022-04-21 RX ADMIN — OMEGA-3 FATTY ACIDS CAP 1000 MG 2000 MG: 1000 CAP at 08:08

## 2022-04-21 RX ADMIN — OXYCODONE HYDROCHLORIDE AND ACETAMINOPHEN 500 MG: 500 TABLET ORAL at 08:08

## 2022-04-21 RX ADMIN — DILTIAZEM HYDROCHLORIDE 240 MG: 240 CAPSULE, EXTENDED RELEASE ORAL at 08:08

## 2022-04-21 RX ADMIN — CHOLECALCIFEROL TAB 10 MCG (400 UNIT) 800 UNITS: 10 TAB at 08:08

## 2022-04-21 RX ADMIN — APIXABAN 5 MG: 5 TABLET, FILM COATED ORAL at 08:08

## 2022-04-21 RX ADMIN — OSELTAMIVIR PHOSPHATE 30 MG: 30 CAPSULE ORAL at 08:08

## 2022-04-21 NOTE — PLAN OF CARE
Problem: Potential for Falls  Goal: Patient will remain free of falls  Description: INTERVENTIONS:  - Educate patient/family on patient safety including physical limitations  - Instruct patient to call for assistance with activity   - Consult OT/PT to assist with strengthening/mobility   - Keep Call bell within reach  - Keep bed low and locked with side rails adjusted as appropriate  - Keep care items and personal belongings within reach  - Initiate and maintain comfort rounds  - Make Fall Risk Sign visible to staff  - Offer Toileting every 2 Hours, in advance of need  - Initiate/Maintain bed alarm  - Obtain necessary fall risk management equipment: non skid footwear, yellow bracelet  - Apply yellow socks and bracelet for high fall risk patients  - Consider moving patient to room near nurses station  Outcome: Progressing     Problem: RESPIRATORY - ADULT  Goal: Achieves optimal ventilation and oxygenation  Description: INTERVENTIONS:  - Assess for changes in respiratory status  - Assess for changes in mentation and behavior  - Position to facilitate oxygenation and minimize respiratory effort  - Oxygen administered by appropriate delivery if ordered  - Initiate smoking cessation education as indicated  - Encourage broncho-pulmonary hygiene including cough, deep breathe, Incentive Spirometry  - Assess the need for suctioning and aspirate as needed  - Assess and instruct to report SOB or any respiratory difficulty  - Respiratory Therapy support as indicated  Outcome: Progressing     Problem: SKIN/TISSUE INTEGRITY - ADULT  Goal: Skin Integrity remains intact(Skin Breakdown Prevention)  Description: Assess:  -Perform Flo assessment  -Clean and moisturize skin   -Inspect skin when repositioning, toileting, and assisting with ADLS  -Assess under medical devices   -Assess extremities for adequate circulation and sensation     Bed Management:  -Have minimal linens on bed & keep smooth, unwrinkled  -Change linens as needed when moist or perspiring  -Avoid sitting or lying in one position while in bed  -Keep HOB at 30 degrees     Toileting:  -Offer bedside commode  -Assess for incontinence  -Use incontinent care products after each incontinent episode     Activity:  -Mobilize patient   -Encourage activity and walks on unit  -Encourage or provide ROM exercises   -Turn and reposition patient   -Use appropriate equipment to lift or move patient in bed  -Instruct/ Assist with weight shifting   -Consider limitation of chair time     Skin Care:  -Avoid use of baby powder, tape, friction and shearing, hot water or constrictive clothing  -Relieve pressure over bony prominences   -Do not massage red bony areas    Next Steps:  -Teach patient strategies to minimize risks   -Consider consults to  interdisciplinary teams  Outcome: Progressing     Problem: MUSCULOSKELETAL - ADULT  Goal: Maintain or return mobility to safest level of function  Description: INTERVENTIONS:  - Assess patient's ability to carry out ADLs; assess patient's baseline for ADL function and identify physical deficits which impact ability to perform ADLs (bathing, care of mouth/teeth, toileting, grooming, dressing, etc )  - Assess/evaluate cause of self-care deficits   - Assess range of motion  - Assess patient's mobility  - Assess patient's need for assistive devices and provide as appropriate  - Encourage maximum independence but intervene and supervise when necessary  - Involve family in performance of ADLs  - Assess for home care needs following discharge   - Consider OT consult to assist with ADL evaluation and planning for discharge  - Provide patient education as appropriate  Outcome: Progressing     Problem: PAIN - ADULT  Goal: Verbalizes/displays adequate comfort level or baseline comfort level  Description: Interventions:  - Encourage patient to monitor pain and request assistance  - Assess pain using appropriate pain scale  - Administer analgesics based on type and severity of pain and evaluate response  - Implement non-pharmacological measures as appropriate and evaluate response  - Consider cultural and social influences on pain and pain management  - Notify physician/advanced practitioner if interventions unsuccessful or patient reports new pain  Outcome: Progressing     Problem: INFECTION - ADULT  Goal: Absence or prevention of progression during hospitalization  Description: INTERVENTIONS:  - Assess and monitor for signs and symptoms of infection  - Monitor lab/diagnostic results  - Monitor all insertion sites, i e  indwelling lines, tubes, and drains  - Monitor endotracheal if appropriate and nasal secretions for changes in amount and color  - [place] appropriate cooling/warming therapies per order  - Administer medications as ordered  - Instruct and encourage patient and family to use good hand hygiene technique  - Identify and instruct in appropriate isolation precautions for identified infection/condition  Outcome: Progressing     Problem: SAFETY ADULT  Goal: Patient will remain free of falls  Description: INTERVENTIONS:  - Educate patient/family on patient safety including physical limitations  - Instruct patient to call for assistance with activity   - Consult OT/PT to assist with strengthening/mobility   - Keep Call bell within reach  - Keep bed low and locked with side rails adjusted as appropriate  - Keep care items and personal belongings within reach  - Initiate and maintain comfort rounds  - Make Fall Risk Sign visible to staff  - Offer Toileting every 2 Hours, in advance of need  - Initiate/Maintain bed alarm  - Obtain necessary fall risk management equipment: non skid footwear, yellow bracelet  - Apply yellow socks and bracelet for high fall risk patients  - Consider moving patient to room near nurses station  Outcome: Progressing  Goal: Maintain or return to baseline ADL function  Description: INTERVENTIONS:  - Educate patient/family on patient safety including physical limitations  - Instruct patient to call for assistance with activity   - Consult OT/PT to assist with strengthening/mobility   - Keep Call bell within reach  - Keep bed low and locked with side rails adjusted as appropriate  - Keep care items and personal belongings within reach  - Initiate and maintain comfort rounds  - Make Fall Risk Sign visible to staff  - Offer Toileting every 2 Hours, in advance of need  - Initiate/Maintain bed alarm  - Obtain necessary fall risk management equipment: yellow bracelet/yellow socks  - Apply yellow socks and bracelet for high fall risk patients  - Consider moving patient to room near nurses station  Outcome: Progressing  Goal: Maintains/Returns to pre admission functional level  Description: INTERVENTIONS:  - Perform BMAT or MOVE assessment daily    - Set and communicate daily mobility goal to care team and patient/family/caregiver     - Collaborate with rehabilitation services on mobility goals if consulted  - Record patient progress and toleration of activity level   Outcome: Progressing     Problem: DISCHARGE PLANNING  Goal: Discharge to home or other facility with appropriate resources  Description: INTERVENTIONS:  - Identify barriers to discharge w/patient and caregiver  - Arrange for needed discharge resources and transportation as appropriate  - Identify discharge learning needs (meds, wound care, etc )  - Arrange for interpretive services to assist at discharge as needed  - Refer to Case Management Department for coordinating discharge planning if the patient needs post-hospital services based on physician/advanced practitioner order or complex needs related to functional status, cognitive ability, or social support system  Outcome: Progressing     Problem: Knowledge Deficit  Goal: Patient/family/caregiver demonstrates understanding of disease process, treatment plan, medications, and discharge instructions  Description: Complete learning assessment and assess knowledge base  Interventions:  - Provide teaching at level of understanding  - Provide teaching via preferred learning methods  Outcome: Progressing     Problem: MOBILITY - ADULT  Goal: Maintain or return to baseline ADL function  Description: INTERVENTIONS:  - Educate patient/family on patient safety including physical limitations  - Instruct patient to call for assistance with activity   - Consult OT/PT to assist with strengthening/mobility   - Keep Call bell within reach  - Keep bed low and locked with side rails adjusted as appropriate  - Keep care items and personal belongings within reach  - Initiate and maintain comfort rounds  - Make Fall Risk Sign visible to staff  - Offer Toileting every 2 Hours, in advance of need  - Initiate/Maintain bed alarm  - Obtain necessary fall risk management equipment: yellow bracelet/yellow socks  - Apply yellow socks and bracelet for high fall risk patients  - Consider moving patient to room near nurses station  Outcome: Progressing  Goal: Maintains/Returns to pre admission functional level  Description: INTERVENTIONS:  - Perform BMAT or MOVE assessment daily    - Set and communicate daily mobility goal to care team and patient/family/caregiver     - Collaborate with rehabilitation services on mobility goals if consulted  - Record patient progress and toleration of activity level   Outcome: Progressing     Problem: Prexisting or High Potential for Compromised Skin Integrity  Goal: Skin integrity is maintained or improved  Description: INTERVENTIONS:  - Identify patients at risk for skin breakdown  - Assess and monitor skin integrity  - Assess and monitor nutrition and hydration status  - Monitor labs   - Assess for incontinence   - Turn and reposition patient  - Assist with mobility/ambulation  - Relieve pressure over bony prominences  - Avoid friction and shearing  - Provide appropriate hygiene as needed including keeping skin clean and dry  - Evaluate need for skin moisturizer/barrier cream  - Collaborate with interdisciplinary team   - Patient/family teaching  - Consider wound care consult   Outcome: Progressing

## 2022-04-21 NOTE — ASSESSMENT & PLAN NOTE
· With acute respiratory failure hypoxia; SpO2 of 88% on room air- successfully titrated off oxygen, and saturating well on room air, without respiratory distress  · Discharge on Tamiflu 30 mg b i d   For 2 additional days to complete a 5 day course  · Continue supportive care at the time of discharge

## 2022-04-21 NOTE — INCIDENTAL FINDINGS
The following findings require follow up:  Radiographic finding   Finding: Small echogenic left kidney nodule  May be a complicated cyst     Follow up required: Yes - discussed with patient att the bedside  Patient verbalized understanding, and will be following up with his PCP      Follow up should be done within 1-2 week(s)    Please notify the following clinician to assist with the follow up:   Dr Mckay Rank

## 2022-04-21 NOTE — ASSESSMENT & PLAN NOTE
· Resolved  · The patient meets SIRS criteria with tachypnea, leukocytosis, and fever with underlying infection of influenza A    · Unlikely underlying bacterial pneumonia given negative procalcitonin, strep/legionella urine antigens  · Received IV cefepime x1 dose in the ED  Azithromycin added x1 day  Antibiotics discontinued, given this is unlikely bacterial pneumonia    · Discharge on Tamiflu  · Leukocytosis improved  · Stable for discharge

## 2022-04-21 NOTE — PLAN OF CARE
Problem: OCCUPATIONAL THERAPY ADULT  Goal: Performs self-care activities at highest level of function for planned discharge setting  See evaluation for individualized goals  Description: Treatment Interventions: ADL retraining,Functional transfer training,Endurance training,UE strengthening/ROM,Patient/family training,Compensatory technique education,Activityengagement,Energy conservation,Equipment evaluation/education          See flowsheet documentation for full assessment, interventions and recommendations  Outcome: Progressing  Note: Limitation: Decreased ADL status,Decreased UE strength,Decreased endurance,Decreased self-care trans,Decreased high-level ADLs  Prognosis: Good  Assessment: Patient participated in Skilled OT session this date with interventions consisting of Energy Conservation techniques and therapeutic exercise to: increase functional use of BUEs, increase BUE muscle strength    Patient agreeable to OT treatment session, upon arrival patient was found supine in bed  Patient performs UB TE using 1# weight x 30 reps all directions as detailed in flow sheet  Patient did not have any c/o of pain, however, did report fatigue following TE  Patient declined to get OOB at this time  Session ended with patient supine in bed, all needs met, and call bell within reach  In comparison to previous session, patient with improvements in UB strength and endurance   Patient requiring frequent re direction, verbal cues for correct technique, verbal cues for pacing thru activity steps and frequent rest periods  Patient performance  demonstrated good carryover of learned techniques and strategies to facilitate safety during functional tasks  Patient continues to be functioning below baseline level, occupational performance remains limited secondary to factors listed above and increased risk for falls and injury  From OT standpoint, recommendation at time of d/c would be Outpatient OT    Patient to benefit from continued Occupational Therapy treatment while in the hospital to address deficits as defined above and maximize level of functional independence with ADLs and functional mobility in order to return to PLOF        OT Discharge Recommendation: Home with outpatient rehabilitation  OT - OK to Discharge: Yes (when medically cleared)

## 2022-04-21 NOTE — NURSING NOTE
Patient resting in recliner  Family in room with patient  Patient A&Ox4  Room air  Vital signs stable

## 2022-04-21 NOTE — ASSESSMENT & PLAN NOTE
· Blood pressure noted to be elevated  · Lisinopril-hydrochlorothiazide held during admission due to PATRICK  · Discharge on pre-admission diltiazem, Prinzide

## 2022-04-21 NOTE — OCCUPATIONAL THERAPY NOTE
04/21/22 0934   OT Last Visit   OT Visit Date 04/21/22   Note Type   Note Type Treatment   Restrictions/Precautions   Weight Bearing Precautions Per Order No   Other Precautions Droplet precautions;Contact/isolation; Fall Risk;Telemetry   Pain Assessment   Pain Assessment Tool 0-10   Pain Score No Pain   Therapeutic Excerise-Strength   UE Strength Yes   Right Upper Extremity- Strength   R Shoulder Flexion; Extension;Horizontal ABduction  (horiz , add, scapular pro/ret)   R Elbow Elbow flexion;Elbow extension   R Wrist   (wrist rotation)   R Weight/Reps/Sets 1# weight x 30 reps   Left Upper Extremity-Strength   L Weights/Reps/Sets TE same as R UE above   Cognition   Overall Cognitive Status WFL   Arousal/Participation Alert; Cooperative   Attention Attends with cues to redirect   Orientation Level Oriented X4   Memory Within functional limits   Following Commands Follows one step commands without difficulty   Comments Pt agreeable to OT treatment  Activity Tolerance   Activity Tolerance Patient tolerated treatment well   Assessment   Assessment Patient participated in Skilled OT session this date with interventions consisting of Energy Conservation techniques and therapeutic exercise to: increase functional use of BUEs, increase BUE muscle strength    Patient agreeable to OT treatment session, upon arrival patient was found supine in bed  Patient performs UB TE using 1# weight x 30 reps all directions as detailed in flow sheet  Patient did not have any c/o of pain, however, did report fatigue following TE  Patient declined to get OOB at this time  Session ended with patient supine in bed, all needs met, and call bell within reach  In comparison to previous session, patient with improvements in UB strength and endurance   Patient requiring frequent re direction, verbal cues for correct technique, verbal cues for pacing thru activity steps and frequent rest periods   Patient performance  demonstrated good carryover of learned techniques and strategies to facilitate safety during functional tasks  Patient continues to be functioning below baseline level, occupational performance remains limited secondary to factors listed above and increased risk for falls and injury  From OT standpoint, recommendation at time of d/c would be Outpatient OT  Patient to benefit from continued Occupational Therapy treatment while in the hospital to address deficits as defined above and maximize level of functional independence with ADLs and functional mobility in order to return to PLOF  Plan   Treatment Interventions UE strengthening/ROM; Endurance training;Energy conservation   Goal Expiration Date 04/29/22   OT Treatment Day 1   OT Frequency 3-5x/wk   Recommendation   OT Discharge Recommendation Home with outpatient rehabilitation   OT - OK to Discharge Yes  (when medically cleared)   AM-PAC Daily Activity Inpatient   Lower Body Dressing 3   Bathing 3   Toileting 3   Upper Body Dressing 3   Grooming 4   Eating 4   Daily Activity Raw Score 20   Daily Activity Standardized Score (Calc for Raw Score >=11) 42 03   AM-PAC Applied Cognition Inpatient   Following a Speech/Presentation 4   Understanding Ordinary Conversation 4   Taking Medications 4   Remembering Where Things Are Placed or Put Away 4   Remembering List of 4-5 Errands 4   Taking Care of Complicated Tasks 4   Applied Cognition Raw Score 24   Applied Cognition Standardized Score 62 21   ALFONZO Lobato

## 2022-04-21 NOTE — UTILIZATION REVIEW
Notification of Discharge   This is a Notification of Discharge from our facility [address]  Please be advised that this patient has been discharge from our facility  Below you will find the admission and discharge date and time including the patients disposition  UTILIZATION REVIEW CONTACT:  Nat Guevara  Utilization   Network Utilization Review Department  Phone: [phone] carefully listen to the prompts  All voicemails are confidential   Email: [email or web address]     PHYSICIAN ADVISORY SERVICES:  FOR [facility] REVIEW - MEDICAL NECESSITY DENIAL  Phone: [phone]  Fax: [phone]  Email: [email or web address]     PRESENTATION DATE: 4/18/2022 10:04 AM  OBERVATION ADMISSION DATE:   INPATIENT ADMISSION DATE: 4/18/22 12:59 PM   DISCHARGE DATE: 4/21/2022 12:45 PM  DISPOSITION: Home/Self Care Home/Self Care      IMPORTANT INFORMATION:  Send all requests for admission clinical reviews, approved or denied determinations and any other requests to dedicated fax number below belonging to the campus where the patient is receiving treatment   List of dedicated fax numbers:  [address] DENIALS (Administrative/Medical Necessity) [phone]   [address] (Maternity/NICU/Pediatrics) [phone]   [facility] [phone]   [address] [facility] [phone]   [address] [place] [address] [phone]   2000 [facility] [address] [place] [address],[address] Floor [address] [place] [address] [phone]   [facility]  [phone]   [address]6 [facility] [address]  [address]01 [facility] 1000 [address] [facility] [phone]

## 2022-04-21 NOTE — DISCHARGE INSTR - AVS FIRST PAGE
Dear Andrew Rao,     It was our pleasure to care for you here at [facility]  It is our hope that we were always able to exceed the expected standards for your care during your stay  You were hospitalized due to ***  You were cared for on the *** floor by Victoriano Walton PA-C under the service of Lionel Ayala DO with the [facility] who covers for your primary care physician (PCP), Kwame Wilson DO, while you were hospitalized  If you have any questions or concerns related to this hospitalization, you may contact us at [phone]  For follow up as well as any medication refills, we recommend that you follow up with your primary care physician  A registered nurse will reach out to you by phone within a few days after your discharge to answer any additional questions that you may have after going home  However, at this time we provide for you here, the most important instructions / recommendations at discharge:     Notable Medication Adjustments -   Eliquis 5 mg twice daily  Tamiflu 30 mg twice daily for the next 2 days  Please resume all other pre-admission medications at the pre-admission doses  Testing Required after Discharge -   None  Important follow up information -   Please follow-up with your PCP within 7-10 days for routine follow-up in addition to further imaging of left kidney nodule  Please follow-up with the outpatient Hematology/Oncology office for results of testing obtained while in the hospital  Other Instructions -   Please maintain a healthy lifestyle with physical activity and healthy diet  Please review this entire after visit summary as additional general instructions including medication list, appointments, activity, diet, any pertinent wound care, and other additional recommendations from your care team that may be provided for you        Sincerely,     Victoriano Walton PA-C

## 2022-04-21 NOTE — NURSING NOTE
Patient stated he needed to use commode to have bowel movement  Patient placed on commode and told by nurse that if he felt he needed to void that he must use the urinal for his 24 hr urine specimen collection  The patient was in agreement and stated he would use urinal  While cleaning patient, the nurse noted that he had urinated on the pad that was in the commode  SLIM attending Arvin Collins PA-C made aware  Will continue urine collection

## 2022-04-21 NOTE — DISCHARGE SUMMARY
[address]  Discharge- Le Nurse 1940, 78 y o  male MRN: [id number]  Unit/Bed#: ICU 03-01 Encounter: [id number]  Primary Care Provider: Chinmay Mohr DO   Date and time admitted to hospital: 4/18/2022 10:04 AM    * Pulmonary embolus [facility])  Assessment & Plan  · Patient has history of right lower extremity DVT  · CT C/A/P obtained in the ED revealing large pulmonary embolism in the right lower lobe with associated infiltrate  Some right heart strain could be present at the RV LV ratio being approximately 1 although judged with standard CT imaging  Possible DVT in the left femoral vein  · Venous duplex revealed evidence of chronic deep vein thrombosis in the proximal and mid femoral vein  · Concern for possible malignancy underlying as CT chest, abdomen and pelvis with contrast shows fractures of the left 10th and 11th ribs suggesting possible pathologic fracture  However, follow-up bone scan more indicative of traumatic versus pathologic fracture  · Hematology/oncology consult appreciated- work-up pending, ok for dc, will need OP follow-up  · Echocardiogram without evidence of heart strain or pericardial fluid  · Heparin drip transition to Eliquis  Prescription provided at the time of discharge  · Patient is medically stable for discharge on 04/21/2022 with PCP and Hematology/Oncology follow-up  Sepsis ([facility] [address] )  Assessment & Plan  · Resolved  · The patient meets SIRS criteria with tachypnea, leukocytosis, and fever with underlying infection of influenza A    · Unlikely underlying bacterial pneumonia given negative procalcitonin, strep/legionella urine antigens  · Received IV cefepime x1 dose in the ED  Azithromycin added x1 day  Antibiotics discontinued, given this is unlikely bacterial pneumonia    · Discharge on Tamiflu  · Leukocytosis improved  · Stable for discharge    Influenza A  Assessment & Plan  · With acute respiratory failure hypoxia; SpO2 of 88% on room air- successfully titrated off oxygen, and saturating well on room air, without respiratory distress  · Discharge on Tamiflu 30 mg b i d  For 2 additional days to complete a 5 day course  · Continue supportive care at the time of discharge      PATRICK (acute kidney injury) ([facility] [address] )  Assessment & Plan  · POA  Presented with creatinine of 1 62  No baseline creatinine; however, previous level- 1 24 mg/dL  · Resolved to 1 09   · Renal ultrasound: Small echogenic left kidney nodule  May be a complicated cyst   If the patient is able, dedicated CT renal protocol or MR is recommended  Otherwise short interval follow-up can be performed  · Status post gentle IV fluid  · Okay to resume all pre-admission blood pressure medications  · Recommend follow-up with PCP for routine lab work    Abnormal CT scan  Assessment & Plan  · CT chest, abdomen and pelvis shows:  · Fractures of the left 10th and 11th ribs possible pathologic fracture- will obtain bone scan  · Follow-up bone scan suggests traumatic rather than pathologic fracture; however, will need OP heme/onc follow-up upon discharge (referral provided)  · Scattered renal cyst 1 of which is mildly hyperdense measuring 1 1 cm  · Follow-up renal ultrasound- small echogenic left kidney nodule; discussed findings with patient, and incidental findings note completed  Patient verbalized understanding  Patient will be following up with PCP for additional imaging  · Mild small bowel dilatation without obvious of transition likely related to ileus   · Admitting provider discussed case with with surgery on-call - as the patient has no symptoms of nausea, vomiting at this time is okay for conservative management at this time      · KUB revealed no evidence of obstruction   · Stable for discharge    Essential hypertension  Assessment & Plan  · Blood pressure noted to be elevated  · Lisinopril-hydrochlorothiazide held during admission due to PATRICK  · Discharge on pre-admission diltiazem, Prinzide        Medical Problems             Resolved Problems  Date Reviewed: 4/21/2022    None              Discharging Physician / Practitioner: Kennedy Paul PA-C  PCP: Ben Coelho DO  Admission Date:   Admission Orders (From admission, onward)     Ordered        04/18/22 1259  Inpatient Admission  Once                      Discharge Date: 04/21/22    Consultations During Hospital Stay:  · Hematology/Oncology     Procedures Performed:   · None    Significant Findings / Test Results:   · Echocardiogram:    Left Ventricle: Left ventricular cavity size is normal  There is mild concentric hypertrophy  The left ventricular ejection fraction is 60%  Systolic function is normal  Wall motion is normal     Aortic Valve: The aortic valve is trileaflet  The leaflets are moderately calcified  There is mildly reduced mobility  There is minimal stenosis  The aortic valve peak velocity is 1 9 m/s  No significant pericardial fluid is seen  · Chest x-ray: Streaky bibasilar opacities likely representing subsegmental atelectasis or scarring  No definite infiltrates  · Chest x-ray:  Question developing left perihilar infiltrate, possibly pneumonia  · CT chest/abdomen/pelvis:  Large pulmonary embolism in the right lower lobe with associated infiltrate  Some right heart strain could be present at the RV LV ratio being approximately 1 although judged with standard CT imaging  Possible DVT in the left femoral vein  No evidence of mass or adenopathy exception of one mildly prominent retroperitoneal node  Fractures of the left 10th and 11th ribs one of which demonstrates possible lytic component suggesting possible pathologic fracture  Bone scan may be useful given the presence of PE  Scattered renal cysts one of which may be mildly hyperdense measuring 1 1 cm  This is adjacent to a larger cyst  Follow-up focused ultrasound be useful    Mild small bowel dilatation without obvious of transition likely related to ileus follow-up may be helpful if symptoms should persist   · Ultrasound kidney and bladder: Small echogenic left kidney nodule  May be a complicated cyst   If the patient is able, dedicated CT renal protocol or MR is recommended  Otherwise short interval follow-up can be performed  · KUB:  No obstruction  Mildly prominent air-filled small bowel loops may represent mild ileus  · Venous duplex lower limbs:  Right- evidence of chronic DVT in the proximal and mid femoral vein  Left-evidence of acute or chronic DVT  · Bone scan:   Increased focal activity of the posterior left 10th and 11th ribs consistent with recent fractures  While the possibility of pathologic fractures cannot be completely excluded, the fact that these fractures occur adjacent to one another and no other suspicious areas of osseous activity are seen which favor traumatic injury rather than osseous metastasis  Incidental Findings:   · Left kidney nodule as seen on CT C/a/P and ultrasound of the kidney and bladder (see above) - incidental findings note completed; discussed with patient and patient verbalized understanding     Test Results Pending at Discharge (will require follow up): · None     Outpatient Tests Requested:  · None    Complications:  None    Reason for Admission:  Pulmonary embolism    Hospital Course:   Buzz Garcia is a 78 y o  male patient with a past medical history significant for hypertension and history of right leg DVT who originally presented to the hospital on 4/18/2022 due to generalized weakness and worsening dyspnea  Please refer to the initial history and physical as outlined by Vikki Darden on 04/18/2022 for additional presenting features  In brief, patient was initially found to meet sepsis criteria with leukocytosis, fever, and tachypnea, and was treated initially with IV fluids and IV antibiotics, which were ventrally discontinued, as he was found to have the flu    Additionally, patient was noted to have an PATRICK on admission, for which she received IV fluids, with complete resolution  Patient was also found to have a large pulmonary embolism, and was started on a heparin drip and was ultimately transitioned to Eliquis  Patient was also found to be positive for the flu, and was treated with Tamiflu  Patient was also found to have fractures of the 10th and 11th ribs, with suspicion for pathologic fracture, for which Hematology/Oncology was consulted on  Patient underwent full Hematology/Oncology workup, for which she will be following up in the outpatient setting  Patient was also found to have a possible small ileus, which was discussed with General surgery, who recommended supportive care at this time, as patient remained asymptomatic  Of note, patient was also found to have an incidentally noted left kidney nodule, for which he will be following up with in the outpatient setting  Patient was seen by PT/OT who ultimately recommended home with outpatient rehab, and was provided with a referral at the time of discharge  Patient was medically stable for discharge on 04/21/2022 with outpatient PCP and Hematology/Oncology follow-up  This is a brief discharge summary; please refer to the above assessment/plan as well as the remainder of the patient's medical history for further details  Please see above list of diagnoses and related plan for additional information  Condition at Discharge: good    Discharge Day Visit / Exam:   Subjective:  Patient seen and examined resting comfortably in bed, in no apparent distress  No acute events overnight  Patient is eager to go home today    Vitals: Blood Pressure: 156/65 (04/21/22 0700)  Pulse: 64 (04/21/22 0700)  Temperature: 97 5 °F (36 4 °C) (04/21/22 0659)  Temp Source: Tympanic (04/21/22 0659)  Respirations: 20 (04/21/22 0700)  Height: 5' 8" (172 7 cm) (04/19/22 0830)  Weight - Scale: 80 6 kg (177 lb 11 1 oz) (04/21/22 0600)  SpO2: 95 % (04/21/22 9976)  Exam:   Physical Exam  Vitals and nursing note reviewed  Constitutional:       General: He is not in acute distress  Appearance: He is normal weight  He is not toxic-appearing  HENT:      Head: Normocephalic and atraumatic  Mouth/Throat:      Mouth: Mucous membranes are moist    Eyes:      Conjunctiva/sclera: Conjunctivae normal    Cardiovascular:      Rate and Rhythm: Normal rate and regular rhythm  Pulses: Normal pulses  Heart sounds: Normal heart sounds  Pulmonary:      Effort: Pulmonary effort is normal  No respiratory distress  Breath sounds: Normal breath sounds  No wheezing, rhonchi or rales  Abdominal:      General: Bowel sounds are normal  There is no distension  Palpations: Abdomen is soft  Tenderness: There is no abdominal tenderness  Musculoskeletal:      Cervical back: Neck supple  Right lower leg: No edema  Left lower leg: No edema  Skin:     General: Skin is warm and dry  Neurological:      Mental Status: He is alert and oriented to person, place, and time  Cranial Nerves: No cranial nerve deficit  Sensory: No sensory deficit  Motor: No weakness  Coordination: Coordination normal    Psychiatric:         Mood and Affect: Mood normal          Behavior: Behavior normal          Thought Content: Thought content normal           Discussion with Family: Patient declined call to   Discharge instructions/Information to patient and family:   See after visit summary for information provided to patient and family  Provisions for Follow-Up Care:  See after visit summary for information related to follow-up care and any pertinent home health orders  Disposition:   Home    Planned Readmission:  None     Discharge Statement:  I spent 70 minutes discharging the patient  This time was spent on the day of discharge  I had direct contact with the patient on the day of discharge   Greater than 50% of the total time was spent examining patient, answering all patient questions, arranging and discussing plan of care with patient as well as directly providing post-discharge instructions  Additional time then spent on discharge activities  Discharge Medications:  See after visit summary for reconciled discharge medications provided to patient and/or family        **Please Note: This note may have been constructed using a voice recognition system**

## 2022-04-21 NOTE — DISCHARGE INSTRUCTIONS
Pulmonary Embolism   WHAT YOU NEED TO KNOW:   A pulmonary embolism (PE) is the sudden blockage of a blood vessel in the lungs by an embolus  A PE can become life-threatening  Go to follow-up appointments and take blood thinners as directed  These are especially important if you were discharged home from the emergency department  DISCHARGE INSTRUCTIONS:   Call your local emergency number (911 in the [address] [facility] [facility]) if:   · You feel lightheaded, short of breath, and have chest pain  · You cough up blood  Call your doctor if:   · Your arm or leg feels warm, tender, and painful  It may look swollen and red  · You have questions or concerns about your condition or care  Medicines:   · Blood thinners  help prevent blood clots  Clots can cause strokes, heart attacks, and death  The following are general safety guidelines to follow while you are taking a blood thinner:    ? Watch for bleeding and bruising while you take blood thinners  Watch for bleeding from your gums or nose  Watch for blood in your urine and bowel movements  Use a soft washcloth on your skin, and a soft toothbrush to brush your teeth  This can keep your skin and gums from bleeding  If you shave, use an electric shaver  Do not play contact sports  ? Tell your dentist and other healthcare providers that you take a blood thinner  Wear a bracelet or necklace that says you take this medicine  ? Do not start or stop any other medicines unless your healthcare provider tells you to  Many medicines cannot be used with blood thinners  ? Take your blood thinner exactly as prescribed by your healthcare provider  Do not skip does or take less than prescribed  Tell your provider right away if you forget to take your blood thinner, or if you take too much  ? Warfarin  is a blood thinner that you may need to take   The following are things you should be aware of if you take warfarin:     § Foods and medicines can affect the amount of warfarin in your blood  Do not make major changes to your diet while you take warfarin  Warfarin works best when you eat about the same amount of vitamin K every day  Vitamin K is found in green leafy vegetables and certain other foods  Ask for more information about what to eat when you are taking warfarin  § You will need to see your healthcare provider for follow-up visits when you are on warfarin  You will need regular blood tests  These tests are used to decide how much medicine you need  · Take your medicine as directed  Contact your healthcare provider if you think your medicine is not helping or if you have side effects  Tell him or her if you are allergic to any medicine  Keep a list of the medicines, vitamins, and herbs you take  Include the amounts, and when and why you take them  Bring the list or the pill bottles to follow-up visits  Carry your medicine list with you in case of an emergency  Prevent another PE:   · Change your body position or move around often  Move and stretch in your seat several times each hour if you travel by car or work at a desk  In an airplane, get up and walk every hour  · Exercise regularly to help increase your blood flow  Walking is a good low-impact exercise  Talk to your healthcare provider about the best exercise plan for you  · Maintain a healthy weight  Ask your healthcare provider how much you should weigh  Ask him or her to help you create a weight loss plan if you are overweight  · Do not smoke  Nicotine and other chemicals in cigarettes and cigars can damage blood vessels and increase your risk for another PE  Ask your healthcare provider for information if you currently smoke and need help to quit  E-cigarettes or smokeless tobacco still contain nicotine  Talk to your healthcare provider before you use these products  · Ask about birth control if you are a woman who takes the pill    A birth control pill increases the risk for blood clots in certain women  The risk is higher if you are also older than 35, smoke cigarettes, or have a blood clotting disorder  Talk to your healthcare provider about other ways to prevent pregnancy, such as a cervical cap or intrauterine device (IUD)  Follow up with your doctor or specialist as directed: You may need to come in regularly for scans to check for blood clots  Your blood may checked to see how long it takes to clot  Your doctor or specialist will tell you if you need to have this test and how often to have it  Write down your questions so you remember to ask them during your visits  © Copyright [employer] 2022 Information is for End User's use only and may not be sold, redistributed or otherwise used for commercial purposes  All illustrations and images included in CareNotes® are the copyrighted property of [employer]  or [employer]  The above information is an  only  It is not intended as medical advice for individual conditions or treatments  Talk to your doctor, nurse or pharmacist before following any medical regimen to see if it is safe and effective for you

## 2022-04-21 NOTE — CASE MANAGEMENT
Case Management Discharge Planning Note    Patient name Abdulkadir Miranda  Location ICU 03/ICU - MRN [id number]  : 1940 Date 2022       Current Admission Date: 2022  Current Admission Diagnosis:Pulmonary embolus [facility])   Patient Active Problem List    Diagnosis Date Noted    [facility] 20[facility]    Influenza A 2022    PATRICK (acute kidney injury) ([facility] ) 2022    Abnormal CT scan 2022    Sepsis ([facility] ) 2022    Medicare annual wellness visit, subsequent 2022    Peripheral vascular disease with claudication ([place] [address] ) 2022    Hyperglycemia 2021    Tremor of both hands 2021    BMI 28 0-28 9,adult 2021    Essential hypertension     Hyperlipidemia     History of deep venous thrombosis (DVT) of distal vein of right lower extremity     Trochanteric bursitis of left hip 10/05/2020      LOS (days): 3  Geometric Mean LOS (GMLOS) (days): 4 80  Days to GMLOS:1 9     OBJECTIVE:  Risk of Unplanned Readmission Score: 11         Current admission status: Inpatient   Preferred Pharmacy:   [address] [place] [id number] [facility]  Phone: [phone] Fax: [phone]    Primary Care Provider: Karina Parker DO    Primary Insurance: [facility]  Secondary Insurance:     DISCHARGE DETAILS:  Pt stable for d/c today per Jim Hogg from [facility]  Pt will need to go home on Eliquis and Tamiflu  Pt typically gets his medications from the [place] mail order service  Pt will require these medications emergently  Scripts were sent to [facility] per pt choice  CM price checked and was able to get Eliquis Free 30 day trial coupon approved  Pt will have $0 copay for same  He will follow up with PCP and get Eliquis through the [place] going forward  Tamiflu cost is $68 out of pocket  Pt aware of all the above and in agreement  Jim Hogg from [facility] also aware and will d/c pt  Pt nurse aware  Son will transport

## 2022-04-21 NOTE — TELEPHONE ENCOUNTER
Spoke with patient's granddaughter, she wanted to confirm patient's appointment time, date and location  Provided her with our office number as well and advised her of our current policies  Patient aware to come to his appointment 15 minutes early to fill out paperwork

## 2022-04-21 NOTE — ASSESSMENT & PLAN NOTE
· POA  Presented with creatinine of 1 62  No baseline creatinine; however, previous level- 1 24 mg/dL  · Resolved to 1 09   · Renal ultrasound: Small echogenic left kidney nodule  May be a complicated cyst   If the patient is able, dedicated CT renal protocol or MR is recommended  Otherwise short interval follow-up can be performed    · Status post gentle IV fluid  · Okay to resume all pre-admission blood pressure medications  · Recommend follow-up with PCP for routine lab work

## 2022-04-21 NOTE — ASSESSMENT & PLAN NOTE
· Patient has history of right lower extremity DVT  · CT C/A/P obtained in the ED revealing large pulmonary embolism in the right lower lobe with associated infiltrate  Some right heart strain could be present at the RV LV ratio being approximately 1 although judged with standard CT imaging  Possible DVT in the left femoral vein  · Venous duplex revealed evidence of chronic deep vein thrombosis in the proximal and mid femoral vein  · Concern for possible malignancy underlying as CT chest, abdomen and pelvis with contrast shows fractures of the left 10th and 11th ribs suggesting possible pathologic fracture  However, follow-up bone scan more indicative of traumatic versus pathologic fracture  · Hematology/oncology consult appreciated- work-up pending, ok for dc, will need OP follow-up  · Echocardiogram without evidence of heart strain or pericardial fluid  · Heparin drip transition to Eliquis  Prescription provided at the time of discharge  · Patient is medically stable for discharge on 04/21/2022 with PCP and Hematology/Oncology follow-up

## 2022-04-21 NOTE — ASSESSMENT & PLAN NOTE
· CT chest, abdomen and pelvis shows:  · Fractures of the left 10th and 11th ribs possible pathologic fracture- will obtain bone scan  · Follow-up bone scan suggests traumatic rather than pathologic fracture; however, will need OP heme/onc follow-up upon discharge (referral provided)  · Scattered renal cyst 1 of which is mildly hyperdense measuring 1 1 cm  · Follow-up renal ultrasound- small echogenic left kidney nodule; discussed findings with patient, and incidental findings note completed  Patient verbalized understanding  Patient will be following up with PCP for additional imaging  · Mild small bowel dilatation without obvious of transition likely related to ileus   · Admitting provider discussed case with with surgery on-call - as the patient has no symptoms of nausea, vomiting at this time is okay for conservative management at this time      · KUB revealed no evidence of obstruction   · Stable for discharge

## 2022-04-21 NOTE — TELEPHONE ENCOUNTER
I spoke with the patients wife Arcenio in regards to the patient being scheduled for a hospital follow up  Arcenio states she will have the patient give the office a call back  Provided lisa with [facility] office being [phone]

## 2022-04-22 ENCOUNTER — TRANSITIONAL CARE MANAGEMENT (OUTPATIENT)
Dept: FAMILY MEDICINE CLINIC | Facility: CLINIC | Age: 82
End: 2022-04-22

## 2022-04-22 LAB
B2 MICROGLOB SERPL-MCNC: 2.7 MG/L (ref 0.6–2.4)
F2 GENE MUT ANL BLD/T: NORMAL
F5 GENE MUT ANL BLD/T: NORMAL

## 2022-04-23 LAB
ALBUMIN UR ELPH-MCNC: 100 %
ALPHA1 GLOB MFR UR ELPH: 0 %
ALPHA2 GLOB MFR UR ELPH: 0 %
B-GLOBULIN MFR UR ELPH: 0 %
BACTERIA BLD CULT: NORMAL
BACTERIA BLD CULT: NORMAL
GAMMA GLOB MFR UR ELPH: 0 %
KAPPA LC UR-MCNC: 90.2 MG/L (ref 1.17–86.46)
KAPPA LC/LAMBDA UR: 3.86 {RATIO} (ref 1.83–14.26)
LAMBDA LC UR-MCNC: 23.35 MG/L (ref 0.27–15.21)
PROT PATTERN UR ELPH-IMP: ABNORMAL
PROT UR-MCNC: 44 MG/DL

## 2022-04-26 ENCOUNTER — CONSULT (OUTPATIENT)
Dept: VASCULAR SURGERY | Facility: CLINIC | Age: 82
End: 2022-04-26
Payer: COMMERCIAL

## 2022-04-26 VITALS
RESPIRATION RATE: 16 BRPM | OXYGEN SATURATION: 97 % | HEART RATE: 67 BPM | BODY MASS INDEX: 28.19 KG/M2 | TEMPERATURE: 98.1 F | SYSTOLIC BLOOD PRESSURE: 164 MMHG | DIASTOLIC BLOOD PRESSURE: 82 MMHG | HEIGHT: 68 IN | WEIGHT: 186 LBS

## 2022-04-26 DIAGNOSIS — I73.9 PERIPHERAL VASCULAR DISEASE WITH CLAUDICATION (HCC): Primary | ICD-10-CM

## 2022-04-26 PROCEDURE — 99204 OFFICE O/P NEW MOD 45 MIN: CPT

## 2022-04-26 PROCEDURE — 1036F TOBACCO NON-USER: CPT

## 2022-04-26 NOTE — ASSESSMENT & PLAN NOTE
78yoM, former smoker, with PMH of HTN, HLD, recent DVT/PE (on eliquis), and PAD  Pt admits to bilateral calf pain with walking approximately a quarter mile  He states this has be ongoing for several years and is intermittent  He denies rest pain or hx of wounds  -BLE warm without cyanosis  Nonpalpable pedal pulses  No ischemic ulcerations  -RLE with monophasic DP and PT doppler signals; LLE biphasic DP and PT signals  -There is evidence of venous stasis in lower legs with spider and reticular veins noted to B feet  -BLE 1+ edema  Diagnostics:   11/23/21 MAURY: RLE: 0 55/144/95; LLE: 0 67/239/75    Recommendations:   -Educated patient on pathophysiology of arterial occlusive disease, available treatment options, and indications for treatment    -Will obtain updated bilateral arterial duplex to assess arterial flow  -Recommend starting a structured walking program 3-5 times/week for 30 minutes   -On Eliquis for PE   -Continue statin    -Continued smoking cessation    -Recommend low-fat, low-sodium diet   -Follow up in 3 months or sooner if needed  -Instructed patient to call the office with questions, concerns, or new symptoms

## 2022-04-26 NOTE — ASSESSMENT & PLAN NOTE
Pt with history of RLE DVT in 2015  Recently hospitalized on 4/18/22 for acute onset of SOB  Pt was found to have a large PE in RLL  Diagnostics:   4/19/22 LEV:   --RLE: There is evidence of chronic deep vein thrombosis in the proximal and mid femoral vein  No SVT  No evidence of superficial thrombophlebitis noted  --LLE: Negative for acute or chronic DVT or SVT  4/18/22 CT C/A/P:  -- large PE in the RLL with associated infiltrate  Some right heart strain could be present at the RV LV ratio being approximately 1 although judged with standard CT imaging  Possible DVT in femoral vein  --Concern for possible malignancy underlying as CT chest, abdomen and pelvis with contrast shows fractures of the left 10th and 11th ribs suggesting possible pathologic fracture      Recommendations:   -Evidence of chronic DVT in RLE femoral vein, unlikely to be source of patients acute symptoms and recent PE     -Educated patient on pathophysiology of deep vein thrombosis     -Continue conservative mgmt with daily compression stockings, lower extremity elevation, regular exercise, warm compresses for discomfort    -On Eliquis for PE, mgmt per PCP  -Hematology/oncology work up for possible malignant etiology for PE  OV scheduled    -Educated patient on s/sx of worsening condition

## 2022-04-26 NOTE — PROGRESS NOTES
Assessment/Plan:    Peripheral vascular disease with claudication ([facility] [address] )  81yoM, former smoker, with PMH of HTN, HLD, recent DVT/PE (on eliquis), and PAD  He presents today for consultation for bilateral calf pain with walking approximately a quarter mile  He states this has be ongoing for several years and is intermittent  He denies rest pain or hx of wounds  Denies pain with elevation, discoloration of LE  -BLE warm without cyanosis  Nonpalpable pedal pulses  No ischemic ulcerations  -RLE with monophasic DP and PT doppler signals; LLE biphasic DP and PT signals  -There is evidence of venous stasis in lower legs with spider and reticular veins noted to B feet  -BLE 1+ edema  Diagnostics:   11/23/21 MAURY: RLE: 0 55/144/95; LLE: 0 67/239/75    Recommendations:   -Educated patient on pathophysiology of arterial occlusive disease, available treatment options, and indications for treatment    -Will obtain updated bilateral arterial duplex to assess arterial flow  -Recommend starting a structured walking program 3-5 times/week for 30 minutes   -On Eliquis for PE   -Continue statin    -Continued smoking cessation    -Recommend low-fat, low-sodium diet   -Follow up in 3 months or sooner if needed  -Instructed patient to call the office with questions, concerns, or new symptoms  History of deep venous thrombosis (DVT) of distal vein of right lower extremity  Pt with history of RLE DVT in 2015  Recently hospitalized on 4/18/22 for acute onset of SOB  Pt was found to have a large PE in RLL  Pt states 5 weeks prior to admission he fell onto his left side and noted increased pain  Diagnostics:   4/19/22 LEV:   --RLE: There is evidence of chronic deep vein thrombosis in the proximal and mid femoral vein  No SVT  No evidence of superficial thrombophlebitis noted  --LLE: Negative for acute or chronic DVT or SVT  4/18/22 CT C/A/P:  -- large PE in the RLL with associated infiltrate   Some right heart strain could be present at the RV LV ratio being approximately 1 although judged with standard CT imaging  Possible DVT in femoral vein  --Concern for possible malignancy underlying as CT chest, abdomen and pelvis with contrast shows fractures of the left 10th and 11th ribs suggesting possible pathologic fracture      Recommendations:   -Evidence of chronic DVT in RLE femoral vein, unlikely to be source of patients acute symptoms and recent PE     -Educated patient on pathophysiology of deep vein thrombosis  -Continue conservative mgmt with daily gentle compression stockings, lower extremity elevation, regular exercise, warm compresses for discomfort    -On Eliquis for PE, mgmt per PCP  He denies bleeding episodes    -Hematology/oncology work up for possible malignant etiology for PE  OV scheduled    -Educated patient on s/sx of worsening condition         Diagnoses and all orders for this visit:    Peripheral vascular disease with claudication ([facility] [address] )  Comments:  right>left LE; he was advised to resume daily 81mg ASA  Orders:  -     Ambulatory Referral to Vascular Surgery  -     VAS lower limb arterial duplex, complete bilateral; Future          Subjective:      Patient ID: Verl Angelucci is a 78 y o  male  Patient is here as a new patient, referred by Dr Claudia Coto  Patient had lower limb venous duplex on 4/19/2022  Patient has c/o of pain in b/l legs when walking  Patient states it gets better when he sits down  He denied any swelling to his legs  Patient is on Eliquis and Pravastatin  Patient is a former smoker  HPI  Verl Angelucci is a 78yoM who presents today for consultation regarding bilateral leg pain with walking approximately a quarter mile  He states this has been ongoing for several years and occurs intermittently  Pt states his pain get better when he sits down or lays down  He denies rest pain, pain with elevation, or hx of wounds   Pt states he used to walk several miles per day but has not been able to because of his most recent hospitalization for influenza  During his admission he was found to have a large PE in the RLL  Pt states weeks before, he fell onto his left side shoveling snow  LEV done in the hospital was negative for acute DVT but showed presence of a chronic DVT in the RLE femoral vein  He states he had a DVT back in 2015 of unknown etiology  Pt states he will be seeing hematology within the next 2 weeks for further work up  He is taking eliquis for PE  I recommended that he continue wearing gentle compression, elevate the legs, and regular exercise   Pt has MAURY waveform analysis back in November 2021 which showed RLE MAURY of 0 55 with GTP of 95 and LLE MAURY of 0 67 with GTP 75  On exam, there is 1+ edema of BLE with nonpalpable pedal pulses  He has B DP and PT doppler signals  LE are warm without cyanosis  There is evidence of venous stasis changes to lower legs  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  We will obtain updated ZAHIRA duplex to assess arterial flow  I recommended patient walk 3-5 times per week for at least 30 minutes to promote growth of collateral circulation  I educated him on s/sx of worsening condition including rest pain, worsening claudication, or wounds  He will continue on statin  We will follow up in 3 months or sooner if needed  I instructed him to call the office with questions, concerns, or new symptoms  The following portions of the patient's history were reviewed and updated as appropriate: allergies, current medications, past family history, past medical history, past social history, past surgical history and problem list     Review of Systems   Constitutional: Negative  HENT: Negative  Eyes: Negative  Respiratory: Negative  Cardiovascular: Negative  Gastrointestinal: Negative  Endocrine: Negative  Genitourinary: Negative  Musculoskeletal: Negative  Skin: Negative  Allergic/Immunologic: Negative  Neurological: Negative  Hematological: Bruises/bleeds easily  Psychiatric/Behavioral: Negative  I have personally reviewed and made appropriate changes to the ROS that was input by the medical assistant         Objective:      Vitals:    22 0858   BP: 164/82   BP Location: Left arm   Patient Position: Sitting   Cuff Size: Adult   Pulse: 67   Resp: 16   Temp: 98 1 °F (36 7 °C)   TempSrc: Tympanic   SpO2: 97%   Weight: 84 4 kg (186 lb)   Height: 5' 8" (1 727 m)       Patient Active Problem List   Diagnosis    Trochanteric bursitis of left hip    Essential hypertension    Hyperlipidemia    History of deep venous thrombosis (DVT) of distal vein of right lower extremity    Hyperglycemia    Tremor of both hands    BMI 28 0-28 9,adult    Medicare annual wellness visit, subsequent    Peripheral vascular disease with claudication ([facility] [address] )    Pulmonary embolus ([facility] [address] )    Influenza A    PATRICK (acute kidney injury) ([facility] [address] )    Abnormal CT scan    Sepsis ([facility] [address] )       Past Surgical History:   Procedure Laterality Date    HERNIA REPAIR      KNEE ARTHROSCOPY Right 2015    PROSTATE SURGERY         Family History   Problem Relation Age of Onset    Lung cancer Mother     Ulcers Father     No Known Problems Sister     Heart attack Brother     Alcohol abuse Brother     Kidney disease Brother        Social History     Socioeconomic History    Marital status: /Civil Union     Spouse name: Not on file    Number of children: Not on file    Years of education: Not on file    Highest education level: Not on file   Occupational History    Not on file   Tobacco Use    Smoking status: Former Smoker     Quit date:      Years since quittin 3    Smokeless tobacco: Never Used    Tobacco comment: 2+ppd x 20 years    Vaping Use    Vaping Use: Never used   Substance and Sexual Activity    Alcohol use: Not Currently    Drug use: Not Currently    Sexual activity: Not Currently   Other Topics Concern    Not on file   Social History Narrative    Retired, lives with his wife, boubacar and great-grandson    [facility] [phone]     Social Determinants of Health     Financial Resource Strain: Not on file   Food Insecurity: No Food Insecurity    Worried About [address] in the Last Year: Never true    [address] in the Last Year: Never true   Transportation Needs: No Transportation Needs    Lack of Transportation (Medical): No    Lack of Transportation (Non-Medical):  No   Physical Activity: Not on file   Stress: Not on file   Social Connections: Not on file   Intimate Partner Violence: Not on file   Housing Stability: Low Risk     Unable to Pay for Housing in the Last Year: No    Number of Places Lived in the Last Year: 1    Unstable Housing in the Last Year: No       No Known Allergies      Current Outpatient Medications:     apixaban (ELIQUIS) 5 mg, Take 1 tablet (5 mg total) by mouth 2 (two) times a day, Disp: 60 tablet, Rfl: 0    ascorbic Acid (VITAMIN C) 500 MG CPCR, Take 500 mg by mouth daily, Disp: , Rfl:     b complex vitamins capsule, Take 1 capsule by mouth daily, Disp: , Rfl:     Boswellia-Glucosamine-Vit D (OSTEO BI-FLEX ONE PER DAY PO), Take by mouth  , Disp: , Rfl:     cholecalciferol (VITAMIN D3) 400 units tablet, Take 800 Units by mouth daily , Disp: , Rfl:     diltiazem (DILACOR XR) 240 MG 24 hr capsule, Take 1 capsule (240 mg total) by mouth daily, Disp: 90 capsule, Rfl: 1    lisinopril-hydrochlorothiazide (PRINZIDE,ZESTORETIC) 20-25 MG per tablet, Take 1 tablet by mouth daily, Disp: , Rfl:     magnesium 30 MG tablet, Take 30 mg by mouth daily, Disp: , Rfl:     Omega-3 Fatty Acids (fish oil) 1,000 mg, Take 2,000 mg by mouth daily, Disp: , Rfl:     pravastatin (PRAVACHOL) 40 mg tablet, TAKE ONE TABLET BY MOUTH AT BEDTIME INSTEAD OF FLUVASTATIN FOR CHOLESTEROL, Disp: , Rfl:     Potassium 99 MG TABS, Take 1 tablet by mouth daily   (Patient not taking: Reported on 2022 ), Disp: , Rfl:       /82 (BP Location: Left arm, Patient Position: Sitting, Cuff Size: Adult)   Pulse 67   Temp 98 1 °F (36 7 °C) (Tympanic)   Resp 16   Ht 5' 8" (1 727 m)   Wt 84 4 kg (186 lb)   SpO2 97%   BMI 28 28 kg/m²          Physical Exam  Vitals and nursing note reviewed  Constitutional:       Appearance: Normal appearance  HENT:      Head: Normocephalic and atraumatic  Eyes:      Extraocular Movements: Extraocular movements intact  Pupils: Pupils are equal, round, and reactive to light  Neck:      Vascular: No carotid bruit  Cardiovascular:      Rate and Rhythm: Normal rate and regular rhythm  Pulses:           Radial pulses are 2+ on the right side and 2+ on the left side  Femoral pulses are 2+ on the right side and 2+ on the left side  Dorsalis pedis pulses are detected w/ Doppler on the right side and detected w/ Doppler on the left side  Posterior tibial pulses are detected w/ Doppler on the right side and detected w/ Doppler on the left side  Heart sounds: Normal heart sounds  Comments: No carotid bruit     RLE: monophasic DP; biphasic PT  LLE: biphasic DP and PT  Pulmonary:      Effort: Pulmonary effort is normal  No respiratory distress  Breath sounds: Normal breath sounds  Abdominal:      General: Bowel sounds are normal  There is no distension  Palpations: Abdomen is soft  Comments: No abdominal bruit or pulsatile masses  Musculoskeletal:         General: Normal range of motion  Cervical back: Normal range of motion and neck supple  Right lower le+ Edema present  Left lower le+ Edema present  Comments: BLE 1+ edema    Skin:     General: Skin is warm and dry  Capillary Refill: Capillary refill takes less than 2 seconds  Comments: Venous stasis changes to BLE with noted spider/reticular veins to B feet  No wounds     Neurological:      General: No focal deficit present  Mental Status: He is alert and oriented to person, place, and time     Psychiatric:         Mood and Affect: Mood normal          Behavior: Behavior normal                Marilynn Good PA-C  [facility]  [phone]

## 2022-04-26 NOTE — PATIENT INSTRUCTIONS
Peripheral artery disease  -You have decreased blood flow in the legs based upon your study that you had last year  I do not think this is concerning right now but should be monitored    -We will get an updated study to assess for blockages and arterial flow   -Continue taking the Eliquis and the statin medication  -Please try to walk as much as possible  This is important in arterial disease and promotes growth of new blood vessels around any blockages  Try walking 3-5 times per week for 30 minutes  Walk until you get the pain in the legs, stop to rest, and then start walking again    -We will follow up in 3 months or sooner if needed  -Please call our office with questions, concerns, or new symptoms    Chronic DVT  -Please continue with gentle compression stockings, frequent leg elevation and regular exercise        Peripheral Artery Disease   AMBULATORY CARE:   Peripheral artery disease (PAD)  is narrow, weak, or blocked arteries  It may affect any arteries outside of your heart and brain  PAD is usually the result of a buildup of fat and cholesterol, also called plaque, along your artery walls  Inflammation, a blood clot, or abnormal cell growth could also block your arteries  PAD prevents normal blood flow to your legs and arms  You are at risk of an amputation if poor blood flow keeps wounds from healing or causes gangrene (tissue death)  Without treatment, PAD can also cause a heart attack or stroke  Common symptoms include:  Mild PAD usually does not cause symptoms   As the disease worsens over time, you may have the following:  · Pain or cramps in your leg or hip while you walk    · A numb, weak, or heavy feeling in your legs    · Dry, scaly, red, or pale skin on your legs    · Thick or brittle nails, or hair loss on your arms and legs    · Foot sores that will not heal    · Burning or aching in your feet and toes while resting (this may be worse when you lie down)    Call your local emergency number (390 in the [address] [place] [address],[address]) if:   · You have any of the following signs of a heart attack:      ? Squeezing, pressure, or pain in your chest    ? You may  also have any of the following:     § Discomfort or pain in your back, neck, jaw, stomach, or arm    § Shortness of breath    § Nausea or vomiting    § Lightheadedness or a sudden cold sweat    · You have any of the following signs of a stroke:      ? Numbness or drooping on one side of your face     ? Weakness in an arm or leg    ? Confusion or difficulty speaking    ? Dizziness, a severe headache, or vision loss    Seek care immediately if:   · You have sores or wounds that will not heal     · You notice black or discolored skin on your arm or leg  · Your skin is cool to the touch  Call your doctor if:   · You have leg pain when you walk 1/8 mile (200 meters) or less, even with treatment  · Your legs are red, dry, or pale, even with treatment  · You have questions or concerns about your condition or care  Treatment for PAD  can help reduce your risk of a heart attack, stroke, or amputation  You may need more than one of the following:  · Medicines  may be given to prevent blood clots and reduce the risk of a heart attack or stroke  You may be given medicine to help prevent your PAD from getting worse  · A supervised exercise program  helps you stay active in normal daily activities  Healthcare providers will help you safely walk or do strength training exercises 3 times a week for 30 to 60 minutes  You will do this for several months, then transition to walking on your own  · Angioplasty  is a procedure to open your artery so blood can flow through normally  A thin tube called a catheter is used to insert a small balloon into your artery  The balloon is inflated to open your blocked artery, and then removed  A tube called a stent may be placed in your artery to hold it open           · Bypass surgery  is used to make a new connection to your artery with a vein from another part of your body, or an artificial graft  The vein or graft is attached to your artery above and below your blockage  This allows blood to flow around the blocked portion of your artery  Manage and prevent PAD:   · Walk for 30 to 60 minutes at least 4 times a week  Your healthcare provider may also refer you to a supervised exercise program  The program helps increase how far you can walk without pain  It also helps you stay active in normal daily activities         · Do not smoke  Nicotine and other chemicals in cigarettes and cigars can worsen PAD  They can also increase your risk for a heart attack or stroke  Ask your healthcare provider for information if you currently smoke and need help to quit  E-cigarettes or smokeless tobacco still contain nicotine  Talk to your healthcare provider before you use these products  · Manage other health conditions  Take your medicines as directed and follow your healthcare provider's instructions if you have high blood pressure or high cholesterol  Perform foot care and check your blood sugar levels as directed if you have diabetes  · Eat heart-healthy foods  Eat whole grains, fruits, and vegetables every day  Limit salt and high-fat foods  Ask your healthcare provider for more information on a heart healthy diet  Ask what a healthy weight is for you  Your healthcare provider can help you create a healthy weight-loss plan, if needed  Follow up with your doctor as directed:  Write down your questions so you remember to ask them during your visits  © Copyright [employer] 2022 Information is for End User's use only and may not be sold, redistributed or otherwise used for commercial purposes  All illustrations and images included in CareNotes® are the copyrighted property of [employer]  or [facility] Quirino Garcia   The above information is an  only  It is not intended as medical advice for individual conditions or treatments  Talk to your doctor, nurse or pharmacist before following any medical regimen to see if it is safe and effective for you

## 2022-04-26 NOTE — ASSESSMENT & PLAN NOTE
4/18/22 CT C/A/P:  -- large PE in the RLL with associated infiltrate  Some right heart strain could be present at the RV LV ratio being approximately 1 although judged with standard CT imaging  Possible DVT in femoral vein     --Concern for possible malignancy underlying as CT chest, abdomen and pelvis with contrast shows fractures of the left 10th and 11th ribs suggesting possible pathologic fracture

## 2022-04-30 NOTE — PROGRESS NOTES
Assessment/Plan:        Diagnoses and all orders for this visit:    Hospital discharge follow-up    Single subsegmental pulmonary embolism without acute cor pulmonale (HCC)  Comments:  CT C/A/P obtained in the ED revealing large pulmonary embolism in the right lower lobe with associated infiltrate, started on and remains on anticoag    Pulmonary embolus (HCC)  -     apixaban (ELIQUIS) 5 mg; Take 1 tablet (5 mg total) by mouth 2 (two) times a day    Chronic deep vein thrombosis (DVT) of right lower extremity, unspecified vein (HCC)  Comments:  in the proximal and mid  femoral vein  Influenza A  Comments:  convalescing well    History of sepsis    History of acute respiratory failure    Nodule of kidney  Comments:  found on CT during hospitalization, requires further w/u  Orders:  -     CT renal protocol; Future    PATRICK (acute kidney injury) ([facility] [address] )  Comments:  resolved- improved back to normal function    Essential hypertension  Comments:  bp meds were held in hospt due to PATRICK- resumed prior to d/c    History of rib fracture  Comments:  2 rib fx's found on CT; Follow-up bone scan suggests traumatic rather than pathologic fracture; however, will need OP heme/onc follow-up - scheduled for 05/11    Former smoker    Immunization due  -     Pneumococcal Conjugate Vaccine 20-valent (Pcv20)         Subjective:  Chief Complaint   Patient presents with    Transition of Care Management     D/C 4/21/22 [facility]  Pulmonary embolism  Left kidney nodule, was told to follow up with PCP  Shira Anderson No refills needed today  Patient ID: Patricia Recio is a 78 y o  male      Per c/c, CECILIA and hosp notes reviewed by me  Pt states, "Could have been worse if I didn't have the (flu) shot"  Denies CP, SOB, abd pain, bleeding gums, epistaxis or blood in urine or stool   "feel great really"  Did lose about 6 pounds while ill        4/18/2022 - 4/21/2022 (3 days)  Yulisa 43  Pulmonary embolus [facility])  Assessment & Plan  · Patient has history of right lower extremity DVT  · CT C/A/P obtained in the ED revealing large pulmonary embolism in the right lower lobe with associated infiltrate  Some right heart strain could be present at the RV LV ratio being approximately 1 although judged with standard CT imaging  Possible DVT in the left femoral vein  · Venous duplex revealed evidence of chronic deep vein thrombosis in the proximal and mid femoral vein  · Concern for possible malignancy underlying as CT chest, abdomen and pelvis with contrast shows fractures of the left 10th and 11th ribs suggesting possible pathologic fracture  However, follow-up bone scan more indicative of traumatic versus pathologic fracture  · Hematology/oncology consult appreciated- work-up pending, ok for dc, will need OP follow-up  · Echocardiogram without evidence of heart strain or pericardial fluid  · Heparin drip transition to Eliquis  Prescription provided at the time of discharge  · Patient is medically stable for discharge on 04/21/2022 with PCP and Hematology/Oncology follow-up  Sepsis ([facility] )  Assessment & Plan  · Resolved  · The patient meets SIRS criteria with tachypnea, leukocytosis, and fever with underlying infection of influenza A    · Unlikely underlying bacterial pneumonia given negative procalcitonin, strep/legionella urine antigens  · Received IV cefepime x1 dose in the ED  Azithromycin added x1 day  Antibiotics discontinued, given this is unlikely bacterial pneumonia  · Discharge on Tamiflu  · Leukocytosis improved  · Stable for discharge  Influenza A  Assessment & Plan  · With acute respiratory failure hypoxia; SpO2 of 88% on room air- successfully titrated off oxygen, and saturating well on room air, without respiratory distress  · Discharge on Tamiflu 30 mg b i d  For 2 additional days to complete a 5 day course  · Continue supportive care at the time of discharge  PATRICK (acute kidney injury) ([facility] )  Assessment & Plan  · POA  Presented with creatinine of 1 62  No baseline creatinine; however, previous level- 1 24 mg/dL  ? Resolved to 1 09   · Renal ultrasound: Small echogenic left kidney nodule   May be a complicated cyst   If the patient is able, dedicated CT renal protocol or MR is recommended   Otherwise short interval follow-up can be performed  · Status post gentle IV fluid  · Okay to resume all pre-admission blood pressure medications  · Recommend follow-up with PCP for routine lab work  Abnormal CT scan  Assessment & Plan  · CT chest, abdomen and pelvis shows:  ? Fractures of the left 10th and 11th ribs possible pathologic fracture- will obtain bone scan  § Follow-up bone scan suggests traumatic rather than pathologic fracture; however, will need OP heme/onc follow-up upon discharge (referral provided)  ? Scattered renal cyst 1 of which is mildly hyperdense measuring 1 1 cm  § Follow-up renal ultrasound- small echogenic left kidney nodule; discussed findings with patient, and incidental findings note completed  Patient verbalized understanding  Patient will be following up with PCP for additional imaging  ? Mild small bowel dilatation without obvious of transition likely related to ileus   · Admitting provider discussed case with with surgery on-call - as the patient has no symptoms of nausea, vomiting at this time is okay for conservative management at this time  · KUB revealed no evidence of obstruction   · Stable for discharge  Essential hypertension  Assessment & Plan  · Blood pressure noted to be elevated  · Lisinopril-hydrochlorothiazide held during admission due to PATRICK  · Discharge on pre-admission diltiazem, Prinzide         Review of Systems   Constitutional: Negative  HENT: Negative for nosebleeds  Respiratory: Negative  Cardiovascular: Negative  Gastrointestinal: Negative  Genitourinary: Negative for hematuria  Hematological: Negative            Objective:     Physical Exam  Constitutional: General: He is not in acute distress  Appearance: He is well-developed  He is not ill-appearing, toxic-appearing or diaphoretic  HENT:      Head: Normocephalic and atraumatic  Mouth/Throat:      Mouth: Mucous membranes are moist       Pharynx: Oropharynx is clear  Eyes:      General: Lids are normal       Conjunctiva/sclera: Conjunctivae normal    Neck:      Thyroid: No thyroid mass or thyromegaly  Vascular: No JVD  Trachea: Trachea and phonation normal    Cardiovascular:      Rate and Rhythm: Normal rate and regular rhythm  Heart sounds: S1 normal and S2 normal  No friction rub  No gallop  Pulmonary:      Effort: Pulmonary effort is normal       Breath sounds: Normal breath sounds and air entry  Musculoskeletal:      Cervical back: Neck supple  Right lower leg: No edema  Left lower leg: No edema  Skin:     General: Skin is warm and dry  Coloration: Skin is not pale  Neurological:      Mental Status: He is alert and oriented to person, place, and time  Psychiatric:         Mood and Affect: Mood normal          Behavior: Behavior normal  Behavior is cooperative  Vitals:    05/02/22 1104   BP: 138/62   BP Location: Left arm   Patient Position: Sitting   Cuff Size: Standard   Pulse: 84   Temp: 98 5 °F (36 9 °C)   TempSrc: Tympanic   SpO2: 98%   Weight: 81 6 kg (180 lb)   Height: 5' 7" (1 702 m)       Transitional Care Management Review:  Reginald Goodell is a 78 y o  male here for TCM follow up       During the TCM phone call patient stated:    TCM Call (since 4/3/2022)     Date and time call was made  4/22/2022  8:58 AM    Hospital care reviewed  Records reviewed        Patient was hospitialized at  [facility]        Date of Admission  04/18/22    Date of discharge  04/21/22    Diagnosis  Pulmonary embolus    Disposition  Home    Were the patients medications reviewed and updated  No    Current Symptoms  None      TCM Call (since 4/3/2022)     Post hospital issues  None    Scheduled for follow up?   Yes    Patients specialists  Other (comment)    Other specialists names  Hematology/Oncology    Did you obtain your prescribed medications  Yes    Do you need help managing your prescriptions or medications  No    Is transportation to your appointment needed  No    I have advised the patient to call PCP with any new or worsening symptoms  Alex Seymour MA    Living Arrangements  Spouse or Significiant other    Are you recieving any outpatient services  No  Physical therapy declined by patient     Are you recieving home care services  No    Are you using any community resources  No    Current waiver services  No    Have you fallen in the last 12 months  No    Interperter language line needed  No    Counseling  Patient          Laly Floyd, DO Yes

## 2022-05-02 ENCOUNTER — OFFICE VISIT (OUTPATIENT)
Dept: FAMILY MEDICINE CLINIC | Facility: CLINIC | Age: 82
End: 2022-05-02
Payer: COMMERCIAL

## 2022-05-02 VITALS
WEIGHT: 180 LBS | HEART RATE: 84 BPM | TEMPERATURE: 98.5 F | DIASTOLIC BLOOD PRESSURE: 62 MMHG | SYSTOLIC BLOOD PRESSURE: 138 MMHG | HEIGHT: 67 IN | BODY MASS INDEX: 28.25 KG/M2 | OXYGEN SATURATION: 98 %

## 2022-05-02 DIAGNOSIS — Z23 IMMUNIZATION DUE: ICD-10-CM

## 2022-05-02 DIAGNOSIS — Z86.19 HISTORY OF SEPSIS: ICD-10-CM

## 2022-05-02 DIAGNOSIS — Z09 HOSPITAL DISCHARGE FOLLOW-UP: Primary | ICD-10-CM

## 2022-05-02 DIAGNOSIS — N17.9 AKI (ACUTE KIDNEY INJURY) (HCC): ICD-10-CM

## 2022-05-02 DIAGNOSIS — I26.99 PULMONARY EMBOLUS (HCC): ICD-10-CM

## 2022-05-02 DIAGNOSIS — Z87.09 HISTORY OF ACUTE RESPIRATORY FAILURE: ICD-10-CM

## 2022-05-02 DIAGNOSIS — I10 ESSENTIAL HYPERTENSION: ICD-10-CM

## 2022-05-02 DIAGNOSIS — J10.1 INFLUENZA A: ICD-10-CM

## 2022-05-02 DIAGNOSIS — Z87.891 FORMER SMOKER: ICD-10-CM

## 2022-05-02 DIAGNOSIS — I26.93 SINGLE SUBSEGMENTAL PULMONARY EMBOLISM WITHOUT ACUTE COR PULMONALE (HCC): ICD-10-CM

## 2022-05-02 DIAGNOSIS — Z87.81 HISTORY OF RIB FRACTURE: ICD-10-CM

## 2022-05-02 DIAGNOSIS — I82.501 CHRONIC DEEP VEIN THROMBOSIS (DVT) OF RIGHT LOWER EXTREMITY, UNSPECIFIED VEIN (HCC): ICD-10-CM

## 2022-05-02 DIAGNOSIS — N28.89 NODULE OF KIDNEY: ICD-10-CM

## 2022-05-02 PROBLEM — A41.9 SEPSIS (HCC): Status: RESOLVED | Noted: 2022-04-18 | Resolved: 2022-05-02

## 2022-05-02 PROCEDURE — G0009 ADMIN PNEUMOCOCCAL VACCINE: HCPCS

## 2022-05-02 PROCEDURE — 99495 TRANSJ CARE MGMT MOD F2F 14D: CPT | Performed by: FAMILY MEDICINE

## 2022-05-02 PROCEDURE — 90677 PCV20 VACCINE IM: CPT

## 2022-05-02 PROCEDURE — 1111F DSCHRG MED/CURRENT MED MERGE: CPT | Performed by: FAMILY MEDICINE

## 2022-05-03 ENCOUNTER — TELEPHONE (OUTPATIENT)
Dept: FAMILY MEDICINE CLINIC | Facility: CLINIC | Age: 82
End: 2022-05-03

## 2022-05-03 NOTE — TELEPHONE ENCOUNTER
Appointment scheduled today   Authorization will be completed by the department that handles prior authorizations for the network

## 2022-05-09 ENCOUNTER — TELEPHONE (OUTPATIENT)
Dept: FAMILY MEDICINE CLINIC | Facility: CLINIC | Age: 82
End: 2022-05-09

## 2022-05-09 ENCOUNTER — HOSPITAL ENCOUNTER (OUTPATIENT)
Dept: NON INVASIVE DIAGNOSTICS | Facility: HOSPITAL | Age: 82
Discharge: HOME | End: 2022-05-09
Payer: COMMERCIAL

## 2022-05-09 DIAGNOSIS — N28.89 NODULE OF KIDNEY: Primary | ICD-10-CM

## 2022-05-09 DIAGNOSIS — I73.9 PERIPHERAL VASCULAR DISEASE WITH CLAUDICATION (HCC): ICD-10-CM

## 2022-05-09 PROCEDURE — 93925 LOWER EXTREMITY STUDY: CPT

## 2022-05-09 PROCEDURE — 93923 UPR/LXTR ART STDY 3+ LVLS: CPT

## 2022-05-09 NOTE — TELEPHONE ENCOUNTER
Please call pt and let him know that there is a shortage of CT contrast material, and MRI was advised as other option for further eval of the abnormal US findings, so I am switching the order to MRI instead- as long as he has no indwelling metal    The CT has been cancelled

## 2022-05-09 NOTE — TELEPHONE ENCOUNTER
Jaquan Murphy called that Pt imaging is approved  Radha Mooer for Pt that it is approved but has not be scheduled yet in case Pt returns the call to our office

## 2022-05-09 NOTE — TELEPHONE ENCOUNTER
Kimberly from [facility] left a message on the voicemail stating that patient is scheduled for an appt tomorrow and sent a tiger text To Dr Radha Miner to address and wants to make sure she received this and gets back to her   Her phone number she can be reached at is [phone]

## 2022-05-10 ENCOUNTER — TELEPHONE (OUTPATIENT)
Dept: FAMILY MEDICINE CLINIC | Facility: CLINIC | Age: 82
End: 2022-05-10

## 2022-05-10 PROCEDURE — 93922 UPR/L XTREMITY ART 2 LEVELS: CPT | Performed by: SURGERY

## 2022-05-10 PROCEDURE — 93925 LOWER EXTREMITY STUDY: CPT | Performed by: SURGERY

## 2022-05-10 NOTE — TELEPHONE ENCOUNTER
Patient is scheduled for a MRI on 5/18/22  Patient was informed that bloodwork is completed due to his age  Please advise

## 2022-05-10 NOTE — TELEPHONE ENCOUNTER
Called central scheduling whom confirmed that the blood work from 4/20/22 can be used  Called and informed patient

## 2022-05-11 ENCOUNTER — OFFICE VISIT (OUTPATIENT)
Dept: HEMATOLOGY ONCOLOGY | Facility: CLINIC | Age: 82
End: 2022-05-11
Payer: COMMERCIAL

## 2022-05-11 ENCOUNTER — TELEPHONE (OUTPATIENT)
Dept: VASCULAR SURGERY | Facility: CLINIC | Age: 82
End: 2022-05-11

## 2022-05-11 VITALS
HEART RATE: 78 BPM | SYSTOLIC BLOOD PRESSURE: 140 MMHG | DIASTOLIC BLOOD PRESSURE: 72 MMHG | OXYGEN SATURATION: 95 % | TEMPERATURE: 98.9 F | BODY MASS INDEX: 28.56 KG/M2 | WEIGHT: 182 LBS | HEIGHT: 67 IN | RESPIRATION RATE: 18 BRPM

## 2022-05-11 DIAGNOSIS — I82.501 CHRONIC DEEP VEIN THROMBOSIS (DVT) OF RIGHT LOWER EXTREMITY, UNSPECIFIED VEIN (HCC): Primary | ICD-10-CM

## 2022-05-11 DIAGNOSIS — I26.99 PULMONARY EMBOLUS (HCC): ICD-10-CM

## 2022-05-11 DIAGNOSIS — S22.42XD CLOSED FRACTURE OF MULTIPLE RIBS OF LEFT SIDE WITH ROUTINE HEALING, SUBSEQUENT ENCOUNTER: ICD-10-CM

## 2022-05-11 PROBLEM — S22.32XD: Status: ACTIVE | Noted: 2022-05-11

## 2022-05-11 PROCEDURE — 99215 OFFICE O/P EST HI 40 MIN: CPT | Performed by: INTERNAL MEDICINE

## 2022-05-11 PROCEDURE — 1160F RVW MEDS BY RX/DR IN RCRD: CPT | Performed by: INTERNAL MEDICINE

## 2022-05-11 PROCEDURE — 1111F DSCHRG MED/CURRENT MED MERGE: CPT | Performed by: INTERNAL MEDICINE

## 2022-05-11 PROCEDURE — 1036F TOBACCO NON-USER: CPT | Performed by: INTERNAL MEDICINE

## 2022-05-11 NOTE — PROGRESS NOTES
Hematology/Oncology Outpatient Follow-up  Andrew Rao 78 y o  male 1940 [id number]    Date:  5/11/2022    Assessment and Plan:  1  Pulmonary embolus (HCC)  The patient was encouraged to stay on the Eliquis 5 mg twice a day indefinitely since he had significant pulmonary embolism which is 2nd episode of clotting event  The hypercoagulable workup was nonconclusive  He did have positive lupus anticoagulant test which could be false positive if he was exposed to heparin at the time of the study  The lupus anticoagulant test is not reliable while the patient is on Eliquis  2  Chronic deep vein thrombosis (DVT) of right lower extremity, unspecified vein (HCC)  He also has history of chronic deep vein thrombosis of the right lower extremity of unknown etiology  Indefinite anticoagulation is the usual recommendation after the 2nd episode of clotting unless there is absolute contraindication  3  Closed fracture of multiple ribs of left side with routine healing, subsequent encounter  The patient had extensive workup to rule out any obvious hint of malignant etiology of his left 10th and 11th fracture which seems to be related to the recent fall  There is no obvious hint of malignant process according to the recent imaging  There is also no obvious hint of monoclonal gammopathy or plasma cell dyscrasia  The patient was asked to follow-up with the PCP and see us on as-needed basis in the future  HPI:  Patient is a pleasant 52-year-old male with past medical history of hypertension, hyperlipidemia, BPH, unprovoked DVT to the right lower extremity around 2012  He states that the blood clot happened after an increase of his medication for his BPH  He was on little over a year's worth of Coumadin which was then discontinued  He denies any family history of thrombosis  He is a former smoker quit 1979 after about 20 years     The patient was seen in the hospital for consultation on 04/19/2022 after he was admitted for redness of breath and fatigue  He was then evaluated with a CT scan of the chest abdomen pelvis which showed large pulmonary embolism in the right lower lobe with associated infiltrate  Some right heart strain was seen  A fracture of the 10th and 11th left ribs was noted with possible lytic component  He also was found to have scattered renal cysts of unknown significance  He had Doppler ultrasound of the lower extremities which showed chronic deep vein thrombosis in the proximal and mid femoral vein  The patient is currently on Eliquis 5 mg twice a day would for indefinite anticoagulation  He had extensive workup during the hospital stay  Including a bone survey which was negative for other suspicious activities beyond the activity seen on the left 10th and 11th ribs which is consistent with the recent fracture  He did admit falling couple weeks earlier  He had hypercoagulable workup during the hospital stay which showed as expected lower than average antithrombin level  The lupus anticoagulant test came back abnormal   He also had low protein S activity level as well which is common in the acute clotting event  It is not entirely clear if he was on heparin around that time  Monoclonal gammopathy workup was done which came back negative for obvious monoclonal gammopathy plasma cell dyscrasia  His PSA level went up from 1 5 to 4 2  Interval history:    ROS: Review of Systems   Constitutional: Negative for chills and fever  HENT: Negative for ear pain and sore throat  Eyes: Negative for pain and visual disturbance  Respiratory: Negative for cough and shortness of breath  Cardiovascular: Negative for chest pain and palpitations  Gastrointestinal: Positive for constipation  Negative for abdominal pain and vomiting  Genitourinary: Negative for dysuria and hematuria  Musculoskeletal: Negative for arthralgias and back pain     Skin: Negative for color change and rash    Neurological: Positive for numbness  Negative for seizures and syncope  All other systems reviewed and are negative  Past Medical History:   Diagnosis Date    Hx of blood clots     Hyperlipidemia     Hypertension     Sepsis ([facility] [address] ) 2022       Past Surgical History:   Procedure Laterality Date    HERNIA REPAIR      KNEE ARTHROSCOPY Right 2015    PROSTATE SURGERY         Social History     Socioeconomic History    Marital status: /Civil Union     Spouse name: None    Number of children: None    Years of education: None    Highest education level: None   Occupational History    None   Tobacco Use    Smoking status: Former Smoker     Quit date:      Years since quittin 3    Smokeless tobacco: Never Used    Tobacco comment: 2+ppd x 20 years    Vaping Use    Vaping Use: Never used   Substance and Sexual Activity    Alcohol use: Not Currently    Drug use: Not Currently    Sexual activity: Not Currently   Other Topics Concern    None   Social History Narrative    Retired, lives with his wife, boubacar and great-grandson    [employer] service [place] [phone]     Social Determinants of Health     Financial Resource Strain: Not on file   Food Insecurity: No Food Insecurity    Worried About [address] [place] [address] in the Last Year: Never true    Nikos of Food in the Last Year: Never true   Transportation Needs: No Transportation Needs    Lack of Transportation (Medical): No    Lack of Transportation (Non-Medical):  No   Physical Activity: Not on file   Stress: Not on file   Social Connections: Not on file   Intimate Partner Violence: Not on file   Housing Stability: Low Risk     Unable to Pay for Housing in the Last Year: No    Number of Places Lived in the Last Year: 1    Unstable Housing in the Last Year: No       Family History   Problem Relation Age of Onset    Lung cancer Mother     Ulcers Father     No Known Problems Sister     Heart attack Brother     Alcohol abuse Brother     Kidney disease Brother        No Known Allergies      Current Outpatient Medications:     apixaban (ELIQUIS) 5 mg, Take 1 tablet (5 mg total) by mouth 2 (two) times a day, Disp: 60 tablet, Rfl: 0    ascorbic Acid (VITAMIN C) 500 MG CPCR, Take 500 mg by mouth daily, Disp: , Rfl:     b complex vitamins capsule, Take 1 capsule by mouth daily, Disp: , Rfl:     cholecalciferol (VITAMIN D3) 400 units tablet, Take 800 Units by mouth daily , Disp: , Rfl:     diltiazem (DILACOR XR) 240 MG 24 hr capsule, Take 1 capsule (240 mg total) by mouth daily, Disp: 90 capsule, Rfl: 1    lisinopril-hydrochlorothiazide (PRINZIDE,ZESTORETIC) 20-25 MG per tablet, Take 1 tablet by mouth daily, Disp: , Rfl:     magnesium 30 MG tablet, Take 30 mg by mouth daily, Disp: , Rfl:     Omega-3 Fatty Acids (fish oil) 1,000 mg, Take 2,000 mg by mouth daily, Disp: , Rfl:     Potassium 99 MG TABS, Take 1 tablet by mouth daily  , Disp: , Rfl:     pravastatin (PRAVACHOL) 40 mg tablet, TAKE ONE TABLET BY MOUTH AT BEDTIME INSTEAD OF FLUVASTATIN FOR CHOLESTEROL, Disp: , Rfl:       Physical Exam:  /72 (BP Location: Left arm, Patient Position: Sitting, Cuff Size: Adult)   Pulse 78   Temp 98 9 °F (37 2 °C)   Resp 18   Ht 5' 7" (1 702 m)   Wt 82 6 kg (182 lb)   SpO2 95%   BMI 28 51 kg/m²     Physical Exam  Constitutional:       Appearance: He is well-developed  HENT:      Head: Normocephalic and atraumatic  Eyes:      General: No scleral icterus  Right eye: No discharge  Left eye: No discharge  Conjunctiva/sclera: Conjunctivae normal       Pupils: Pupils are equal, round, and reactive to light  Neck:      Thyroid: No thyromegaly  Trachea: No tracheal deviation  Cardiovascular:      Rate and Rhythm: Normal rate and regular rhythm  Heart sounds: Normal heart sounds  No murmur heard  No friction rub     Pulmonary:      Effort: Pulmonary effort is normal  No respiratory distress  Breath sounds: Normal breath sounds  No wheezing or rales  Chest:      Chest wall: No tenderness  Abdominal:      General: There is no distension  Palpations: Abdomen is soft  There is no hepatomegaly or splenomegaly  Tenderness: There is no abdominal tenderness  There is no guarding or rebound  Musculoskeletal:         General: No tenderness or deformity  Normal range of motion  Cervical back: Normal range of motion and neck supple  Lymphadenopathy:      Cervical: No cervical adenopathy  Skin:     General: Skin is warm and dry  Coloration: Skin is not pale  Findings: No erythema or rash  Neurological:      Mental Status: He is alert and oriented to person, place, and time  Cranial Nerves: No cranial nerve deficit  Coordination: Coordination normal       Deep Tendon Reflexes: Reflexes are normal and symmetric  Psychiatric:         Behavior: Behavior normal          Thought Content: Thought content normal          Judgment: Judgment normal            Labs:  Lab Results   Component Value Date    WBC 10 27 (H) 04/21/2022    HGB 13 8 04/21/2022    HCT 41 7 04/21/2022     (H) 04/21/2022     04/21/2022     Lab Results   Component Value Date    K 3 7 04/21/2022     04/21/2022    CO2 25 04/21/2022    BUN 21 04/21/2022    CREATININE 1 09 04/21/2022    GLUF 105 (H) 02/17/2021    CALCIUM 8 9 04/21/2022    CORRECTEDCA 9 6 04/20/2022    AST 11 (L) 04/20/2022    ALT 16 04/20/2022    ALKPHOS 42 04/20/2022    EGFR 62 04/21/2022       Patient voiced understanding and agreement in the above discussion  Aware to contact our office with questions/symptoms in the interim

## 2022-05-11 NOTE — TELEPHONE ENCOUNTER
----- Message from Mikhail Smith PA-C sent at 5/11/2022  7:16 AM EDT -----  Reviewed  Please have patient follow up with a surgeon to discuss results

## 2022-05-18 ENCOUNTER — HOSPITAL ENCOUNTER (OUTPATIENT)
Dept: MRI IMAGING | Facility: HOSPITAL | Age: 82
Discharge: HOME | End: 2022-05-18
Payer: COMMERCIAL

## 2022-05-18 DIAGNOSIS — N28.89 NODULE OF KIDNEY: ICD-10-CM

## 2022-05-18 PROCEDURE — 74183 MRI ABD W/O CNTR FLWD CNTR: CPT

## 2022-05-18 PROCEDURE — A9585 GADOBUTROL INJECTION: HCPCS | Performed by: FAMILY MEDICINE

## 2022-05-18 PROCEDURE — G1004 CDSM NDSC: HCPCS

## 2022-05-18 RX ADMIN — GADOBUTROL 8 ML: 604.72 INJECTION INTRAVENOUS at 17:46

## 2022-06-07 ENCOUNTER — TELEPHONE (OUTPATIENT)
Dept: FAMILY MEDICINE CLINIC | Facility: CLINIC | Age: 82
End: 2022-06-07

## 2022-06-07 NOTE — TELEPHONE ENCOUNTER
Patient called regarding the MRI that he had completed 05/18/22  Wanted to discuss results, informed me the doctor had tried to reach out the other week regarding this  Please advise        Ashley Hwang DO   5/23/2022  1:24 PM EDT         I LM on pt's voicemail that I am calling to discuss a test result and will try calling again

## 2022-06-14 ENCOUNTER — OFFICE VISIT (OUTPATIENT)
Dept: FAMILY MEDICINE CLINIC | Facility: CLINIC | Age: 82
End: 2022-06-14
Payer: COMMERCIAL

## 2022-06-14 VITALS
BODY MASS INDEX: 28.41 KG/M2 | DIASTOLIC BLOOD PRESSURE: 64 MMHG | TEMPERATURE: 99.5 F | WEIGHT: 181 LBS | SYSTOLIC BLOOD PRESSURE: 118 MMHG | HEIGHT: 67 IN | HEART RATE: 75 BPM | OXYGEN SATURATION: 96 %

## 2022-06-14 DIAGNOSIS — R93.89 ABNORMAL CT SCAN: ICD-10-CM

## 2022-06-14 DIAGNOSIS — N28.9 RENAL LESION: Primary | ICD-10-CM

## 2022-06-14 PROCEDURE — 3078F DIAST BP <80 MM HG: CPT | Performed by: FAMILY MEDICINE

## 2022-06-14 PROCEDURE — 3074F SYST BP LT 130 MM HG: CPT | Performed by: FAMILY MEDICINE

## 2022-06-14 PROCEDURE — 99214 OFFICE O/P EST MOD 30 MIN: CPT | Performed by: FAMILY MEDICINE

## 2022-06-14 NOTE — PROGRESS NOTES
Assessment/Plan:         Diagnoses and all orders for this visit:    Renal lesion   Bosniak 2F cystic renal lesion measuring up to 5 3 cm in the upper pole of the left kidney, which although decreased in overall size since 2014 demonstrates a single smooth, minimally thickened enhancing septation  "Recommend follow-up contrast enhanced MRI abdomen in 6 months and 12 months and subsequent annual surveillance for a total of 5 years to assess for morphologic change"  Orders:  -     MRI abdomen w wo contrast; Future    Abnormal CT scan          renal lesions noted on prior ultrasound and CT respectively correspond to a 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) and a left upper pole 1 3 cm hemorrhagic or proteinaceous cyst   Pt states he sees urologist, Dr Guillaume Salas twice a year- no records in chart from urology- will have our staff call for notes           Subjective:   Chief Complaint   Patient presents with    Follow-up     Review results  No concerns today  No refills needed today  Patient ID: Emi Chen is a 78 y o  male  Here to discuss MRI results- ordered to further w/u Nodule of kidney that had been found on CT during hospitalization in April for acute PE, sepsis, acute influenza A, acute resp failure and PATRICK  The renal nodule required further w/u with CT renal protocol recommended, but due to IV contrast shortage, MRI was advised instead   Was new pt here last year  Pt has been doing well in interim  States today that he sees urologist, Dr Guillaume Salas twice a year, next f/u "maybe end of the year, not sure"        5/18/2022  MRI OF THE ABDOMEN WITH AND WITHOUT CONTRAST WITH MRCP  INDICATION: 78 years / Male  [id number]: Other specified disorders of kidney and ureter   COMPARISON: Renal ultrasound 4/18/2022  CT abdomen/pelvis 4/18/2022  FINDINGS:  LOWER CHEST:   Trace right pleural effusion  LIVER: Normal size and morphology  Scattered tiny simple cysts   No suspicious hepatic lesion     BILE DUCTS:  No intrahepatic or extrahepatic bile duct dilation  Common bile duct is normal in caliber  No choledocholithiasis, suspicious mass, or evidence of biliary stricture  GALLBLADDER: No gallstones  Normal wall thickness  No pericholecystic inflammation  PANCREAS:  Pancreatic parenchyma within normal limits  Normal signal intensity and enhancement  No main pancreatic ductal dilation  ADRENAL GLANDS:  Adrenal glands are within normal limits  SPLEEN:  Normal spleen  KIDNEYS/PROXIMAL URETERS: Normal kidney size and position with symmetric enhancement  Minimal bilateral nonspecific perinephric stranding  No hydronephrosis  No suspicious findings in the right kidney  CYSTIC RENAL MASS #1  Series 3/7, 6/17  Location: LEFT upper pole   Size: 5 3 x 4 3 x 4 7 cm  This lesion has overall decreased in size from prior, measuring 6 8 x 4 8 x 5 5 cm in 2014  Density/Signal features affecting Bosniak class: No characterizing features  Enhancement: Enhancing septation as described below  Calcifications: None  Septa: There are multiple smooth, minimally thickened (3 mm) nonenhancing septations as well as a smooth, minimally thickened progressively enhancing septation (series 12/69, 11/23)  Walls: Well defined  Smooth  Thin  Bosniak classification: IIF  The hyperechoic lesion noted in the lower pole of the left kidney on prior ultrasound corresponds to a 1 2 cm focus of nonenhancing subcapsular macroscopic fat signal intensity (5/27) consistent with renal angiomyolipoma  This has the appearance of a   subtle contour defect with attenuation of retroperitoneal fat on prior CT  For solitary renal AMLs of this size (<4 cm) without documentation of growth, no further work-up or management is necessary in an asymptomatic patient (i e  those without   hematuria, flank pain, or spontaneous hemorrhage)  Reference: [facility] 7717; [id number]     The mildly hyperdense lesion noted in the upper pole of the left kidney on prior CT corresponds to a 1 3 cm cyst with layering hemorrhagic or proteinaceous material without suspicious enhancement (series 3/4, 10/55)  BOWEL:   Normal CT appearance of the stomach and bowel  No evidence of obstruction  PERITONEUM:  No ascites  RETROPERITONEUM: There is a 1 3 cm left para-aortic unilocular lesion without enhancement or restricted diffusion (3/14, 6/32) consistent with a small lymphocele  LYMPH NODES:  No abdominal lymphadenopathy  Previously noted prominent retroperitoneal lymph node corresponds to cystic lesion described above  VESSELS:  Normal caliber abdominal aorta with moderate to severe atherosclerotic plaque  The celiac, SMA, and TONA are patent  The main, right, and left portal veins are patent  The SMV and splenic vein are patent  The hepatic veins are patent  The renal   arteries and veins are patent  ABDOMINAL WALL:  Diastases recti  BONES:  No suspicious osseous lesion  IMPRESSION:  1  Bosniak 2F cystic renal lesion measuring up to 5 3 cm in the upper pole of the left kidney, which although decreased in overall size since 2014 demonstrates a single smooth, minimally thickened enhancing septation  Recommend follow-up contrast   enhanced MRI abdomen in 6 months and 12 months and subsequent annual surveillance for a total of 5 years to assess for morphologic change  REFERENCE: Marya [facility], et al  Bosniak classification of cystic renal masses, Version 2019:   an update proposal and needs assessment  Radiology 2019; [id number]    2   The renal lesions noted on prior ultrasound and CT respectively correspond to a 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) and a left upper pole 1 3 cm hemorrhagic or proteinaceous cyst   The study was marked in EPIC for significant notification, as well as for follow-up          The following portions of the patient's history were reviewed and updated as appropriate: allergies, current medications, past family history, past medical history, past social history, past surgical history and problem list     Review of Systems   Constitutional: Negative  Respiratory: Negative  Cardiovascular: Negative  Gastrointestinal: Negative for abdominal pain, nausea and vomiting  Genitourinary: Negative for decreased urine volume, difficulty urinating, dysuria, flank pain, frequency, hematuria and urgency  Musculoskeletal: Negative for back pain  Objective:      /64 (BP Location: Left arm, Patient Position: Sitting, Cuff Size: Standard)   Pulse 75   Temp 99 5 °F (37 5 °C) (Tympanic)   Ht 5' 7" (1 702 m)   Wt 82 1 kg (181 lb)   SpO2 96%   BMI 28 35 kg/m²          Physical Exam  Constitutional:       General: He is not in acute distress  Appearance: He is well-developed  He is not ill-appearing, toxic-appearing or diaphoretic  Comments: Appears in usual state of health and good humor   HENT:      Head: Normocephalic and atraumatic  Eyes:      General: Lids are normal       Conjunctiva/sclera: Conjunctivae normal    Neck:      Trachea: Phonation normal    Pulmonary:      Effort: Pulmonary effort is normal    Skin:     Coloration: Skin is not pale  Neurological:      Mental Status: He is alert and oriented to person, place, and time  Psychiatric:         Mood and Affect: Mood normal          Behavior: Behavior normal  Behavior is cooperative

## 2022-06-21 ENCOUNTER — OFFICE VISIT (OUTPATIENT)
Dept: VASCULAR SURGERY | Facility: CLINIC | Age: 82
End: 2022-06-21
Payer: COMMERCIAL

## 2022-06-21 ENCOUNTER — TELEPHONE (OUTPATIENT)
Dept: VASCULAR SURGERY | Facility: CLINIC | Age: 82
End: 2022-06-21

## 2022-06-21 VITALS
HEIGHT: 67 IN | SYSTOLIC BLOOD PRESSURE: 142 MMHG | BODY MASS INDEX: 28.69 KG/M2 | OXYGEN SATURATION: 98 % | DIASTOLIC BLOOD PRESSURE: 70 MMHG | HEART RATE: 75 BPM | WEIGHT: 182.8 LBS | TEMPERATURE: 97.8 F | RESPIRATION RATE: 16 BRPM

## 2022-06-21 DIAGNOSIS — I26.93 SINGLE SUBSEGMENTAL PULMONARY EMBOLISM WITHOUT ACUTE COR PULMONALE (HCC): ICD-10-CM

## 2022-06-21 DIAGNOSIS — I82.501 CHRONIC DEEP VEIN THROMBOSIS (DVT) OF RIGHT LOWER EXTREMITY, UNSPECIFIED VEIN (HCC): ICD-10-CM

## 2022-06-21 DIAGNOSIS — I70.213 ATHEROSCLEROSIS OF NATIVE ARTERIES OF EXTREMITIES WITH INTERMITTENT CLAUDICATION, BILATERAL LEGS (HCC): Primary | ICD-10-CM

## 2022-06-21 PROCEDURE — 99215 OFFICE O/P EST HI 40 MIN: CPT | Performed by: SURGERY

## 2022-06-21 PROCEDURE — 1036F TOBACCO NON-USER: CPT | Performed by: SURGERY

## 2022-06-21 PROCEDURE — 1160F RVW MEDS BY RX/DR IN RCRD: CPT | Performed by: SURGERY

## 2022-06-21 RX ORDER — SODIUM CHLORIDE 9 MG/ML
75 INJECTION, SOLUTION INTRAVENOUS CONTINUOUS
Status: CANCELLED | OUTPATIENT
Start: 2022-06-21

## 2022-06-21 NOTE — TELEPHONE ENCOUNTER
REMINDER: Under Reason For Call, comments MUST be formatted as:   (Surgeon's Initials) / (Procedure)      Special Instructions / FYI:     Procedure: RLE angiogram, L femoral access    Level: 4 - Route clearance(s) to [facility] Surgery Coordinator Pool    Allergies: Patient has no known allergies  Instructions Given: Angiogram Instructions    Dialysis: Patient is not on dialysis  Return Visit Required Prior to Procedure: No     Consent: I certify that patient has signed, printed, timed, and dated their surgery consent  I certify that the patient's LEGAL NAME and DATE OF BIRTH are written in the upper left corner on BOTH sides of the consent  I certify that BOTH sides of the completed surgery consent have been scanned into the patient's Epic chart by myself on 6/21/2022  Yes, I have LABELED the consent in Epic as Consent for Vascular Procedure  For Surgical Clearances     Levels   1-3   ROUTE this encounter to [facility] Clearance Pool (AND)   [facility] Surgery Coordinator Pool     Level   4   ROUTE this encounter to [facility] Surgery Coordinator Pool       HYDRATION CLEARANCES   ONLY ROUTE TO  [facility] Clearance Pool     Patient does not require any pre operative clearance  Yes, I have ROUTED this encounter to [facility] Surgery Coordinator and/or [facility] Clearance Pool

## 2022-06-21 NOTE — PATIENT INSTRUCTIONS
1) PAD  -your testing showed that you have blockages in the arteries in the legs that is causing your symptoms when you walk  -we are going to do an angiogram procedure to try to fix this; we will start with the right leg    2) Medications  -please restart taking aspirin 81mg once daily  -please continue your pravastatin  -please continue your eliquis; you will need to stop this for 2 days prior to your procedure    Angiogram   WHAT YOU NEED TO KNOW:   What do I need to know about an angiogram?  An angiogram is used to examine blood flow through your arteries  Arteries carry blood from your heart to your body  How do I prepare for the procedure? Your healthcare provider will tell you how to prepare for your procedure  He may tell you not to eat or drink anything after midnight on the day of your procedure  He will tell you what medicines to take or not take on the day of your procedure  Contrast liquid will be used during the procedure to help your arteries show up better in the pictures  Tell your healthcare provider if you have ever had an allergic reaction to contrast liquid  Arrange to have someone drive you home after your procedure  What will happen during the procedure? You may be given medicine to help you relax or make you drowsy  You may get local anesthesia to numb the area where the angiogram catheter will go in  Hair may be removed from the procedure site  A catheter will be put into an artery in your leg near your groin, or in your arm or wrist  The catheter travels through the artery to the area being studied  Contrast liquid is put through the catheter to help your blood vessels and organs show up better in the x-ray pictures  You may feel warm as the liquid is put into the catheter  You may get a headache or feel nauseated  These feelings should go away quickly  Your healthcare providers will remove the catheter and apply pressure to the wound for several minutes   They may place a pressure bandage or other pressure device over the wound to help stop any bleeding  What will happen after the procedure? If insertion was in your wrist, the pressure device will be around your wrist  Healthcare providers will slowly decrease pressure in the device  If insertion was in your groin, a pressure bandage will be in place  Keep your arm or leg straight  Do not  get out of bed until your healthcare provider says it is okay  Healthcare providers will frequently monitor your vital signs and pulses  They will also frequently check your wound for bleeding  After you are monitored for several hours, you may be able to go home  What are the risks of the procedure? You may bleed heavily after your catheter is removed  The catheter may damage your artery, and you may need surgery to fix the damage  You could have kidney problems from the contrast liquid  You could have an allergic reaction to the contrast liquid or numbing medicine  You may develop a blood clot that causes pain and swelling, and stops blood from flowing  A blood clot in your arm or leg can break loose and travel to your lungs and become life-threatening  CARE AGREEMENT:   You have the right to help plan your care  Learn about your health condition and how it may be treated  Discuss treatment options with your healthcare providers to decide what care you want to receive  You always have the right to refuse treatment  The above information is an  only  It is not intended as medical advice for individual conditions or treatments  Talk to your doctor, nurse or pharmacist before following any medical regimen to see if it is safe and effective for you  © Copyright [employer] 2022 Information is for End User's use only and may not be sold, redistributed or otherwise used for commercial purposes   All illustrations and images included in CareNotes® are the copyrighted property of [employer]  or [address] [place], [place] [email or web address]

## 2022-06-21 NOTE — PROGRESS NOTES
Assessment/Plan:    Pt is an 77 yo M w/ HTN, hx RLE DVT, PE 4/22, HLD, PAD w/ BLE claudication    Atherosclerosis of native arteries of extremities with intermittent claudication, bilateral legs (HCC)  -     IR abdominal aortagram; Future  -     Basic metabolic panel; Future  -     CBC and Platelet; Future  -     Protime-INR; Future  -100yrd claudication, R>L; used to be an avid walker/biker  -reviewed LEADs which show R: 0 48/134/90 and L: 0 68/121/76 w/ B SFA occlusions and B tibial disease  -discussed the pathophysiology of arterial disease and options for management including medical, interventional; discussed option for angiogram, in a staged fashion, starting with the right leg and risks and benefits of this; all questions answered  -plan for RLE angiogram, L femoral access  -labs    Single subsegmental pulmonary embolism without acute cor pulmonale (HCC)  Chronic deep vein thrombosis (DVT) of right lower extremity, unspecified vein ([facility] [address] )  -from what I can gather, had RLE DVT, unprovoked, about '10 and provoked PE in 4/22  -will be on lifelong anticoagulation    Medications  -continue Eliquis; will stop for 2 days prior to procedure  -restart ASA 81mg once daily (was stopped when he started eliquis)  -continue statin    Other orders  -     Nursing communication Apply gown prior to procedure; Standing  -     Have Patient Void On Call to Procedure Room; Standing  -     Insert and Maintain IV; Standing  -     sodium chloride 0 9 % infusion         Operative Scheduling Information:    Hospital:  [facility]    Physician:  Me    Surgery: RLE angiogram, L femoral access    Urgency:  Standard    Level:  Level 4: Outpatients to be scheduled for screening procedures and elective surgery that can be delayed for longer than one month without reasonable expectation of detriment to patient      Case Length:  Normal    Post-op Bed:  Outpatient    OR Table:  IR    Equipment Needs:  Rep: BS if at [facility]    Medication Instructions:  Aspirin:   Continue (do not hold)  Eliquis:  Hold for 2 days prior to procedure    Hydration:  No    Contrast Allergy:  no    Subjective:      Patient ID: Peter Aguilar is a 78 y o  male  Patient is here to review results of ZAHIRA done on 5/9/2022  Patient has c/o b/l leg pain with walking far distances, he states the right leg is worse  Pain gets better with rest  Patient is on Eliquis  Patient is a former smoker  HPI:    Patient presents to review testing and discuss PAD  Patient notes pain in his R shin and calf  This is a burning pain  This occurs with walking and stops with rest   He also gets symptoms on the left but this is less severe  He can walk about 100yrds before having to rest   He notes these symptoms have been present for about 10 years but worsened over the last year  He used to be able to walk and bike on the trail near his home  About 5 years ago, he could go 15-20miles on a bike  He had a R torn meniscus which limited his activity  In April of '22, he notes Flu, fall w/ rib fractures, and PE  Has hx of RLE DVT about 10 years ago, unprovoked from what I can tell  Prior smoker, quit '79    Stopped ASA a few months ago  Takes Eliquis for PE  Takes pravastatin  The following portions of the patient's history were reviewed and updated as appropriate: allergies, current medications, past family history, past medical history, past social history, past surgical history and problem list     Review of Systems   Constitutional: Negative  HENT: Negative  Eyes: Negative  Respiratory: Negative  Negative for shortness of breath  Cardiovascular: Positive for leg swelling  Negative for chest pain  Gastrointestinal: Negative  Endocrine: Negative  Genitourinary: Negative  Musculoskeletal: Positive for gait problem (R>L claudication)  Skin: Negative  Negative for wound  Allergic/Immunologic: Negative  Hematological: Negative  Psychiatric/Behavioral: Negative  Objective:      /70 (BP Location: Left arm, Patient Position: Sitting, Cuff Size: Adult)   Pulse 75   Temp 97 8 °F (36 6 °C) (Temporal)   Resp 16   Ht 5' 7" (1 702 m)   Wt 82 9 kg (182 lb 12 8 oz)   SpO2 98%   BMI 28 63 kg/m²          Physical Exam  Vitals and nursing note reviewed  Constitutional:       Appearance: He is well-developed  HENT:      Head: Normocephalic and atraumatic  Eyes:      Conjunctiva/sclera: Conjunctivae normal    Cardiovascular:      Rate and Rhythm: Normal rate and regular rhythm  Pulses:           Radial pulses are 2+ on the right side and 2+ on the left side  Femoral pulses are 2+ on the right side and 2+ on the left side  Popliteal pulses are 0 on the right side and 0 on the left side  Dorsalis pedis pulses are 0 on the right side and 0 on the left side  Posterior tibial pulses are 0 on the right side and 0 on the left side  Heart sounds: Normal heart sounds  No murmur heard  Comments: No carotid bruits  Pulmonary:      Effort: Pulmonary effort is normal  No respiratory distress  Breath sounds: Rales present  No wheezing  Abdominal:      General: There is no distension  Palpations: Abdomen is soft  Tenderness: There is no abdominal tenderness  There is no rebound  Musculoskeletal:         General: Normal range of motion  Cervical back: Normal range of motion and neck supple  Right lower le+ Edema present  Left lower le+ Edema present  Skin:     General: Skin is warm and dry  Comments: BLE varicosities at the knees and lower legs and retics/spiders at ankles    No wounds   Neurological:      Mental Status: He is alert and oriented to person, place, and time  Psychiatric:         Behavior: Behavior normal            I have reviewed and made appropriate changes to the review of systems input by the medical assistant      Vitals: 22 1113   BP: 142/70   BP Location: Left arm   Patient Position: Sitting   Cuff Size: Adult   Pulse: 75   Resp: 16   Temp: 97 8 °F (36 6 °C)   TempSrc: Temporal   SpO2: 98%   Weight: 82 9 kg (182 lb 12 8 oz)   Height: 5' 7" (1 702 m)       Patient Active Problem List   Diagnosis    Trochanteric bursitis of left hip    Essential hypertension    Hyperlipidemia    History of deep venous thrombosis (DVT) of distal vein of right lower extremity    Hyperglycemia    Tremor of both hands    BMI 28 0-28 9,adult    Medicare annual wellness visit, subsequent    Peripheral vascular disease with claudication ([facility] [address] )    Pulmonary embolus ([facility] [address] )    Influenza A    PATRICK (acute kidney injury) ([facility] [address] )    Abnormal CT scan    History of rib fracture    Nodule of kidney    History of acute respiratory failure    History of sepsis    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)    Former smoker    Fracture of rib of left side with routine healing       Past Surgical History:   Procedure Laterality Date    HERNIA REPAIR      KNEE ARTHROSCOPY Right 2015    PROSTATE SURGERY         Family History   Problem Relation Age of Onset    Lung cancer Mother     Ulcers Father     No Known Problems Sister     Heart attack Brother     Alcohol abuse Brother     Kidney disease Brother        Social History     Socioeconomic History    Marital status: /Civil Union     Spouse name: Not on file    Number of children: Not on file    Years of education: Not on file    Highest education level: Not on file   Occupational History    Not on file   Tobacco Use    Smoking status: Former Smoker     Quit date:      Years since quittin 4    Smokeless tobacco: Never Used    Tobacco comment: 2+ppd x 20 years    Vaping Use    Vaping Use: Never used   Substance and Sexual Activity    Alcohol use: Not Currently    Drug use: Not Currently    Sexual activity: Not Currently   Other Topics Concern    Not on file   Social History Narrative    Retired, lives with his wife, boubacar and great-grandson    [employer] service [place] [phone]     Social Determinants of Health     Financial Resource Strain: Not on file   Food Insecurity: No Food Insecurity    Worried About [address] in the Last Year: Never true    [address] in the Last Year: Never true   Transportation Needs: No Transportation Needs    Lack of Transportation (Medical): No    Lack of Transportation (Non-Medical):  No   Physical Activity: Not on file   Stress: Not on file   Social Connections: Not on file   Intimate Partner Violence: Not on file   Housing Stability: Low Risk     Unable to Pay for Housing in the Last Year: No    Number of Places Lived in the Last Year: 1    Unstable Housing in the Last Year: No       No Known Allergies      Current Outpatient Medications:     apixaban (ELIQUIS) 5 mg, Take 1 tablet (5 mg total) by mouth 2 (two) times a day, Disp: 60 tablet, Rfl: 0    ascorbic Acid (VITAMIN C) 500 MG CPCR, Take 500 mg by mouth daily, Disp: , Rfl:     b complex vitamins capsule, Take 1 capsule by mouth daily, Disp: , Rfl:     cholecalciferol (VITAMIN D3) 400 units tablet, Take 800 Units by mouth daily , Disp: , Rfl:     diltiazem (DILACOR XR) 240 MG 24 hr capsule, Take 1 capsule (240 mg total) by mouth daily, Disp: 90 capsule, Rfl: 1    lisinopril-hydrochlorothiazide (PRINZIDE,ZESTORETIC) 20-25 MG per tablet, Take 1 tablet by mouth daily, Disp: , Rfl:     magnesium 30 MG tablet, Take 30 mg by mouth daily, Disp: , Rfl:     Omega-3 Fatty Acids (fish oil) 1,000 mg, Take 2,000 mg by mouth daily, Disp: , Rfl:     Potassium 99 MG TABS, Take 1 tablet by mouth daily  , Disp: , Rfl:     pravastatin (PRAVACHOL) 40 mg tablet, TAKE ONE TABLET BY MOUTH AT BEDTIME INSTEAD OF FLUVASTATIN FOR CHOLESTEROL, Disp: , Rfl:

## 2022-06-22 NOTE — TELEPHONE ENCOUNTER
Verified patient's insurance   CONFIRMED - Patient's insurance is [employer]  Is patient requesting a call when authorization has been obtained? Patient did not request a call  Surgery Date: 8/3/22  Primary Surgeon: CARLEY // [facility] (NPI: [id number])  Assisting Surgeon: Not Applicable (N/A)  Facility: [facility] (Tax: [id number] / NPI: [id number])  Inpatient / Outpatient: Outpatient  Level: 4    Clearance Received: No clearance ordered  Consent Received: Yes, scanned into Epic on 6/21/22  Medication Hold / Last Dose: Hold on Eliquis 2 days prior  Last dose 7/31/22  VQI Spreadsheet: Not Applicable (N/A)  IR Notified: 6/22/22  Rep   Notified: BS notified 6/22/22  Equipment Needs: Not Applicable (N/A)  Vas Lab Requested: Not Applicable (N/A)  Patient Contacted: 6/22/22    Diagnosis: I70 [id number]  Procedure/ CPT Code(s): Angiogram // CPT: 84213, 96913, [id number] and 31461 // Procedure to take place in IR [Referral Based]    For varicose vein related procedures:   Last LEVDR: Not Applicable (N/A)  CEAP Classification: Not Applicable (N/A)  VCSS: Not Applicable (N/A)    Post Operative Date/ Time: 8/16/22 , 10:30am Maximiliano with [facility] (NPI: [id number])     Pt will have blood work done at [address] [place] [address]

## 2022-06-22 NOTE — TELEPHONE ENCOUNTER
Authorization requirements reviewed  Please refer to Referral number  [id number]   for case updates      No authorization required

## 2022-07-21 ENCOUNTER — APPOINTMENT (OUTPATIENT)
Dept: LAB | Facility: CLINIC | Age: 82
End: 2022-07-21
Payer: COMMERCIAL

## 2022-07-21 DIAGNOSIS — I70.213 ATHEROSCLEROSIS OF NATIVE ARTERIES OF EXTREMITIES WITH INTERMITTENT CLAUDICATION, BILATERAL LEGS (HCC): ICD-10-CM

## 2022-07-21 LAB
ANION GAP SERPL CALCULATED.3IONS-SCNC: 6 MMOL/L (ref 4–13)
BUN SERPL-MCNC: 30 MG/DL (ref 5–25)
CALCIUM SERPL-MCNC: 9.4 MG/DL (ref 8.3–10.1)
CHLORIDE SERPL-SCNC: 107 MMOL/L (ref 96–108)
CO2 SERPL-SCNC: 25 MMOL/L (ref 21–32)
CREAT SERPL-MCNC: 1.36 MG/DL (ref 0.6–1.3)
ERYTHROCYTE [DISTWIDTH] IN BLOOD BY AUTOMATED COUNT: 14.1 % (ref 11.6–15.1)
GFR SERPL CREATININE-BSD FRML MDRD: 47 ML/MIN/1.73SQ M
GLUCOSE P FAST SERPL-MCNC: 110 MG/DL (ref 65–99)
HCT VFR BLD AUTO: 48.6 % (ref 36.5–49.3)
HGB BLD-MCNC: 16.2 G/DL (ref 12–17)
INR PPP: 1.22 (ref 0.84–1.19)
MCH RBC QN AUTO: 33.6 PG (ref 26.8–34.3)
MCHC RBC AUTO-ENTMCNC: 33.3 G/DL (ref 31.4–37.4)
MCV RBC AUTO: 101 FL (ref 82–98)
PLATELET # BLD AUTO: 184 THOUSANDS/UL (ref 149–390)
PMV BLD AUTO: 11.8 FL (ref 8.9–12.7)
POTASSIUM SERPL-SCNC: 3.9 MMOL/L (ref 3.5–5.3)
PROTHROMBIN TIME: 15.7 SECONDS (ref 11.6–14.5)
RBC # BLD AUTO: 4.82 MILLION/UL (ref 3.88–5.62)
SODIUM SERPL-SCNC: 138 MMOL/L (ref 135–147)
WBC # BLD AUTO: 8.91 THOUSAND/UL (ref 4.31–10.16)

## 2022-07-21 PROCEDURE — 80048 BASIC METABOLIC PNL TOTAL CA: CPT

## 2022-07-21 PROCEDURE — 36415 COLL VENOUS BLD VENIPUNCTURE: CPT

## 2022-07-21 PROCEDURE — 85610 PROTHROMBIN TIME: CPT

## 2022-07-21 PROCEDURE — 85027 COMPLETE CBC AUTOMATED: CPT

## 2022-07-29 ENCOUNTER — TELEPHONE (OUTPATIENT)
Dept: HEMATOLOGY ONCOLOGY | Facility: CLINIC | Age: 82
End: 2022-07-29

## 2022-07-29 NOTE — TELEPHONE ENCOUNTER
Morris for pt reminding him that Sunday 7/31/22 is his last dose of Eliquis prior to his procedure with Dr  Doctor

## 2022-07-29 NOTE — TELEPHONE ENCOUNTER
Received a phone call via Teams gopi from pt asking that documentation regarding how long he will be on Rx Eliquis be faxed over to [facility] attn Dr Solis Rosenthal at [phone]  Office note of 5/11/22 faxed over

## 2022-08-03 ENCOUNTER — HOSPITAL ENCOUNTER (OUTPATIENT)
Dept: INTERVENTIONAL RADIOLOGY/VASCULAR | Facility: HOSPITAL | Age: 82
Discharge: HOME | End: 2022-08-03
Attending: SURGERY | Admitting: SURGERY
Payer: COMMERCIAL

## 2022-08-03 VITALS
HEART RATE: 58 BPM | SYSTOLIC BLOOD PRESSURE: 146 MMHG | WEIGHT: 182 LBS | OXYGEN SATURATION: 97 % | TEMPERATURE: 98.2 F | RESPIRATION RATE: 22 BRPM | BODY MASS INDEX: 28.56 KG/M2 | HEIGHT: 67 IN | DIASTOLIC BLOOD PRESSURE: 67 MMHG

## 2022-08-03 DIAGNOSIS — I70.213 ATHEROSCLEROSIS OF NATIVE ARTERIES OF EXTREMITIES WITH INTERMITTENT CLAUDICATION, BILATERAL LEGS (HCC): Primary | ICD-10-CM

## 2022-08-03 PROCEDURE — C1894 INTRO/SHEATH, NON-LASER: HCPCS

## 2022-08-03 PROCEDURE — C1887 CATHETER, GUIDING: HCPCS

## 2022-08-03 PROCEDURE — 75716 ARTERY X-RAYS ARMS/LEGS: CPT

## 2022-08-03 PROCEDURE — 37220 HB ILIAC REVASC (AKA CPT37220): CPT

## 2022-08-03 PROCEDURE — 37221 HB ILIAC REVASC W/STENT: CPT

## 2022-08-03 PROCEDURE — 99152 MOD SED SAME PHYS/QHP 5/>YRS: CPT

## 2022-08-03 PROCEDURE — C1760 CLOSURE DEV, VASC: HCPCS

## 2022-08-03 PROCEDURE — 99152 MOD SED SAME PHYS/QHP 5/>YRS: CPT | Performed by: SURGERY

## 2022-08-03 PROCEDURE — 76937 US GUIDE VASCULAR ACCESS: CPT | Performed by: SURGERY

## 2022-08-03 PROCEDURE — 75625 CONTRAST EXAM ABDOMINL AORTA: CPT | Performed by: SURGERY

## 2022-08-03 PROCEDURE — 99153 MOD SED SAME PHYS/QHP EA: CPT

## 2022-08-03 PROCEDURE — C1769 GUIDE WIRE: HCPCS

## 2022-08-03 PROCEDURE — 75625 CONTRAST EXAM ABDOMINL AORTA: CPT

## 2022-08-03 PROCEDURE — 36246 INS CATH ABD/L-EXT ART 2ND: CPT | Performed by: SURGERY

## 2022-08-03 PROCEDURE — C1725 CATH, TRANSLUMIN NON-LASER: HCPCS

## 2022-08-03 PROCEDURE — C1876 STENT, NON-COA/NON-COV W/DEL: HCPCS

## 2022-08-03 PROCEDURE — 34707 EVASC RPR ILIO-ILIAC NDGFT: CPT | Performed by: SURGERY

## 2022-08-03 PROCEDURE — 75716 ARTERY X-RAYS ARMS/LEGS: CPT | Performed by: SURGERY

## 2022-08-03 RX ORDER — CLOPIDOGREL BISULFATE 75 MG/1
150 TABLET ORAL ONCE
Status: COMPLETED | OUTPATIENT
Start: 2022-08-03 | End: 2022-08-03

## 2022-08-03 RX ORDER — IODIXANOL 320 MG/ML
50 INJECTION, SOLUTION INTRAVASCULAR
Status: COMPLETED | OUTPATIENT
Start: 2022-08-03 | End: 2022-08-03

## 2022-08-03 RX ORDER — LIDOCAINE WITH 8.4% SOD BICARB 0.9%(10ML)
SYRINGE (ML) INJECTION CODE/TRAUMA/SEDATION MEDICATION
Status: COMPLETED | OUTPATIENT
Start: 2022-08-03 | End: 2022-08-03

## 2022-08-03 RX ORDER — OXYCODONE HYDROCHLORIDE AND ACETAMINOPHEN 5; 325 MG/1; MG/1
1 TABLET ORAL EVERY 4 HOURS PRN
Status: DISCONTINUED | OUTPATIENT
Start: 2022-08-03 | End: 2022-08-07 | Stop reason: HOSPADM

## 2022-08-03 RX ORDER — CLOPIDOGREL BISULFATE 75 MG/1
TABLET ORAL
Status: COMPLETED
Start: 2022-08-03 | End: 2022-08-03

## 2022-08-03 RX ORDER — FENTANYL CITRATE 50 UG/ML
INJECTION, SOLUTION INTRAMUSCULAR; INTRAVENOUS CODE/TRAUMA/SEDATION MEDICATION
Status: COMPLETED | OUTPATIENT
Start: 2022-08-03 | End: 2022-08-03

## 2022-08-03 RX ORDER — SODIUM CHLORIDE 9 MG/ML
125 INJECTION, SOLUTION INTRAVENOUS CONTINUOUS
Status: DISCONTINUED | OUTPATIENT
Start: 2022-08-03 | End: 2022-08-07 | Stop reason: HOSPADM

## 2022-08-03 RX ORDER — SODIUM CHLORIDE 9 MG/ML
75 INJECTION, SOLUTION INTRAVENOUS CONTINUOUS
Status: DISCONTINUED | OUTPATIENT
Start: 2022-08-03 | End: 2022-08-07 | Stop reason: HOSPADM

## 2022-08-03 RX ORDER — HEPARIN SODIUM 1000 [USP'U]/ML
INJECTION, SOLUTION INTRAVENOUS; SUBCUTANEOUS CODE/TRAUMA/SEDATION MEDICATION
Status: COMPLETED | OUTPATIENT
Start: 2022-08-03 | End: 2022-08-03

## 2022-08-03 RX ORDER — MIDAZOLAM HYDROCHLORIDE 2 MG/2ML
INJECTION, SOLUTION INTRAMUSCULAR; INTRAVENOUS CODE/TRAUMA/SEDATION MEDICATION
Status: COMPLETED | OUTPATIENT
Start: 2022-08-03 | End: 2022-08-03

## 2022-08-03 RX ORDER — CLOPIDOGREL BISULFATE 75 MG/1
75 TABLET ORAL DAILY
Qty: 90 TABLET | Refills: 0 | Status: SHIPPED | OUTPATIENT
Start: 2022-08-03 | End: 2022-10-05 | Stop reason: ALTCHOICE

## 2022-08-03 RX ADMIN — MIDAZOLAM HYDROCHLORIDE 1 MG: 1 INJECTION, SOLUTION INTRAMUSCULAR; INTRAVENOUS at 08:27

## 2022-08-03 RX ADMIN — FENTANYL CITRATE 50 MCG: 50 INJECTION, SOLUTION INTRAMUSCULAR; INTRAVENOUS at 08:13

## 2022-08-03 RX ADMIN — FENTANYL CITRATE 50 MCG: 50 INJECTION, SOLUTION INTRAMUSCULAR; INTRAVENOUS at 08:28

## 2022-08-03 RX ADMIN — Medication 10 ML: at 08:29

## 2022-08-03 RX ADMIN — HEPARIN SODIUM 5000 UNITS: 1000 INJECTION, SOLUTION INTRAVENOUS; SUBCUTANEOUS at 09:30

## 2022-08-03 RX ADMIN — MIDAZOLAM HYDROCHLORIDE 1 MG: 1 INJECTION, SOLUTION INTRAMUSCULAR; INTRAVENOUS at 08:13

## 2022-08-03 RX ADMIN — FENTANYL CITRATE 25 MCG: 50 INJECTION, SOLUTION INTRAMUSCULAR; INTRAVENOUS at 09:43

## 2022-08-03 RX ADMIN — IODIXANOL 60 ML: 320 INJECTION, SOLUTION INTRAVASCULAR at 09:29

## 2022-08-03 RX ADMIN — CLOPIDOGREL BISULFATE 150 MG: 75 TABLET ORAL at 11:08

## 2022-08-03 NOTE — DISCHARGE INSTRUCTIONS
Please restart your Eliquis tomorrow 8/4/22 in the AM  DISCHARGE INSTRUCTIONS  ARTERIOGRAM/ANGIOPLASTY/STENT    ACTIVITY: On the evening following the procedure, you should be mostly resting  Someone should remain with you during the evening and overnight following the procedure  On the day after your procedure, limit your activity to walking  Avoid heavy lifting (no more than 15 lbs) for the first three days  Walking up steps and normal activities may be resumed as you feel ready  You should not drive a car for at least two days following discharge from the hospital  You may ride in a car  If you have any questions regarding a particular activity, please discuss with your doctor or nurse before you are discharged  DIET:  Resume your normal diet  Drink more water than usual for the next 24 hours  PROCEDURE SITE: You may have a procedure site in your groin, arm, or foot  You may have surgical glue at your procedure site  The glue is used to cover the procedure site, assist in closure, and prevent contamination  This adhesive will darken and peel away on its own within one to two weeks  Do not pick at it  You should shower daily  Wash incision daily with soap and water, but do not rub or scrub the incision; rinse thoroughly and pat dry  Do not bathe in a tub or swim for the first 2 week following your procedure or if you have any open wounds  It is normal to have some bruising, swelling or discoloration around the procedure site  IF increasing redness, pain, or a bulge develops, call our office immediately  If present, you may remove the band-aid or steri-strips over your procedure site after two days  If you notice any active bleeding at the site, apply pressure to the site and call our office ([phone]) or [phone]  FOLLOW UP STUDIES:  Your doctor will discuss whether further treatments or follow-up studies are necessary at your first post procedure visit      FOLLOW UP APPOINTMENTS:  Making and keeping follow up appointments and ultrasound tests are important to your recovery  If you have difficulty making it to or keeping your follow up appointments, call the office  If you have increased pain, fever >101 5, increased drainage, redness or a bad smell at your surgery site, new coldness/numbness of your arm or leg, please call us immediately and GO directly to the ER  PLEASE CALL THE OFFICE IF YOU HAVE ANY QUESTIONS  [phone]  [phone]  [address] [facility] , [address], [place], [address]  [address], [address], [place], 210 [facility]  [address], [facility], [address] [facility]  611 [facility], [facility], [address]    [address], [address], [address]  [facility], [address] [facility], [address]  [address] [place] [address], 8614 [facility], [address] [place] [address]  One [facility], [address] [facility], [facility],[address], Tyler Godoy 6  201 [facility] [address] e [facility], [address] Gene BURGESSgamadhaviUNC [facility]

## 2022-08-03 NOTE — PROCEDURES
Procedures    Preop Dx: PAD w/ short distance BLE claudication    Procedure: Aortogram  RLE runoff  LLE runoff  L EIA PTA w/ 5x60mm  balloon  L EIA stent w/ [id number] Innova stent    B PT signals at completion      Vascular Quality Initiative - Peripheral Vascular Intervention     Urgency: Elective    Functional Status:  Restricted in physically strenuous activity but ambulatory and able to carry out work of a light or sedentary nature  Ambulation: Amb = independently ambulatory    Leg Symptoms    Right: Moderate Claudication:  ischemic limb muscle pain that limits walking 1-2 blocks (300-600 feet, or 1-2 football fields)  Left: Moderate Claudication:  ischemic limb muscle pain that limits walking 1-2 blocks (300-600 feet, or 1-2 football fields)    COVID Information  COVID Symptoms Pre-Procedure: Asymptomatic    Treatment Delayed by Pandemic: None    Access   Number of Sites: 1     Access Site 1:     Side 1: Left    Site 1: Femoral Retrograde    Access Guidance 1:U/S    Largest Sheath Size 1: 6 Fr  Closure Device 1: MynxGrip      Number of Closure Devices: 1     Closure Device Outcome: Closure device failed, intervention         Procedure  Fluoro Time: 18 3 minutes  Contrast Volume: Visipaque 60 ml  DAP: 121 23 Gy cm2  CO2: no  Anticoagulant: Heparin  Protamine: No  If Creatinine is > 1 2 or missing, JUSTIN Prophylaxis IV saline     Treatment Details  Indication: Occlusive Disease,  None     Post Procedure  Patient currently taking: Statin, Yes      Antiplatelet Medication, No, Patient is receiving anticoagulation    Procedure Complications: Yes   Complications:  Other  Dissection  Myocardial Infarction: No  Thrombosis: {No  Embolization:  No  Target Lesion Dissection: No  Remote Dissection: Interventional  Perforation: No  Planned Amputation: No        Target Lesion Dissection by Artery:   Artery 1: Interventional       Access Site Complications/Treatments: 1     Access Site 1:   Dissection

## 2022-08-16 ENCOUNTER — TELEPHONE (OUTPATIENT)
Dept: ADMINISTRATIVE | Facility: HOSPITAL | Age: 82
End: 2022-08-16

## 2022-08-16 ENCOUNTER — OFFICE VISIT (OUTPATIENT)
Dept: VASCULAR SURGERY | Facility: CLINIC | Age: 82
End: 2022-08-16
Payer: COMMERCIAL

## 2022-08-16 VITALS
BODY MASS INDEX: 28.91 KG/M2 | DIASTOLIC BLOOD PRESSURE: 76 MMHG | HEART RATE: 70 BPM | WEIGHT: 184.2 LBS | SYSTOLIC BLOOD PRESSURE: 148 MMHG | TEMPERATURE: 96.9 F | HEIGHT: 67 IN | OXYGEN SATURATION: 97 %

## 2022-08-16 DIAGNOSIS — I70.213 ATHEROSCLEROSIS OF NATIVE ARTERIES OF EXTREMITIES WITH INTERMITTENT CLAUDICATION, BILATERAL LEGS (HCC): Primary | ICD-10-CM

## 2022-08-16 DIAGNOSIS — I26.93 SINGLE SUBSEGMENTAL PULMONARY EMBOLISM WITHOUT ACUTE COR PULMONALE (HCC): ICD-10-CM

## 2022-08-16 DIAGNOSIS — Z87.891 FORMER SMOKER: ICD-10-CM

## 2022-08-16 DIAGNOSIS — I82.501 CHRONIC DEEP VEIN THROMBOSIS (DVT) OF RIGHT LOWER EXTREMITY, UNSPECIFIED VEIN (HCC): ICD-10-CM

## 2022-08-16 PROCEDURE — 3078F DIAST BP <80 MM HG: CPT | Performed by: SURGERY

## 2022-08-16 PROCEDURE — 1160F RVW MEDS BY RX/DR IN RCRD: CPT | Performed by: SURGERY

## 2022-08-16 PROCEDURE — 99215 OFFICE O/P EST HI 40 MIN: CPT | Performed by: SURGERY

## 2022-08-16 PROCEDURE — 3077F SYST BP >= 140 MM HG: CPT | Performed by: SURGERY

## 2022-08-16 NOTE — PATIENT INSTRUCTIONS
1) PAD  -your testing showed that you have blockages in the arteries in the legs that is causing your symptoms when you walk  -the angiogram showed blockages on the right side that would be better treated with surgery; the blockages on the left could likely be treated with balloons/stents  -because your symptoms have improved, I do not see a need to do any more interventions now  -if you symptoms get worse again to where they are affecting your lifestyle, or if you get any wounds on the legs or feet that are not healing well or look infected, please come in for another appointment right away  -make sure that your legs are protected and that the cat is not scratching your legs; you are not going to heal because of the decreased blood flow  -we will get another ultrasound in 6 months    2) Medications  -please continue taking aspirin 81mg once daily  -please take the plavix (clopidogril) for 2 months total (end date 10/3/22)  -please continue your pravastatin  -please continue your eliquis No

## 2022-08-16 NOTE — PROGRESS NOTES
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 canal; on the L, there is a mid segment SFA occlusion w/ diffuse stenosis; unfortunately, the closure device on the L CFA access caused a dissection plane as it was inserted and this was treated with EIA stent 8/3/22  -oddly, patient notes improvement in his BLE s/p procedure despite no improvements made in perfusion; did discuss options for revasc if this was needed in the future; on the right, this would best be treated with femoral endarterectomy +/- fem-BK pop bypass w/ treatment of the profunda aneurysm (not necessary purely based on size, but would correct it at same time if operating); on the LLE, this could likely be treated endovascularly with PTA/stent  -advised patient to RTC if symptoms became life-limiting or if he developed any nonhealing wounds; advised being careful not to let the new kitten scratch his legs  -will get repeat LEADs in 6 months  -f/u 1 year or sooner if worsening symptoms or wounds    Chronic deep vein thrombosis (DVT) of right lower extremity, unspecified vein ([address] )  Single subsegmental pulmonary embolism without acute cor pulmonale ([address] )  -from what I can gather, had RLE DVT, unprovoked, about '10 and provoked PE in 4/22  -chronic R CFV/FV/popV noted on art DU in May  -will be on lifelong anticoagulation    Former smoker  -quit in the 70s    Medications  -continue Eliquis (hx of DVT/PE)  -cont ASA 81mg once daily (restarted last visit)  -continue statin    Subjective:      Patient ID: Mita Henderson is a 78 y o  male  Pt presents today to rev BLE agram w/ run off w/ intervention done on 8/3/22  Pt is taking Eliquis, Plavix, and pravastatin  HPI:    Patient presents to review angiogram    Patient notes pain in his R shin and calf  This is a burning pain  This occurs with walking and stops with rest   He also gets symptoms on the left but this is less severe  He can walk about 100yrds before having to rest   He notes these symptoms have been present for about 10 years but worsened over the last year  He used to be able to walk and bike on the trail near his home  About 5 years ago, he could go 15-20miles on a bike  He had a R torn meniscus which limited his activity  Currently, he can only do a few minutes on a stationary bike  Oddly, now s/p angiogram, his BLE claudication is improved, despite the fact that I did not intervene  He reports being able to walk around the whole grocery store  He does still have symptoms after several minutes of biking  His symptoms no longer seem to be life-limiting  In April of '22, he notes Flu, fall w/ rib fractures, and PE  Has hx of RLE DVT about 10 years ago, unprovoked from what I can tell  Chronic RLE DVT was also noted on recent arterial DU    Prior smoker, quit '79    Now back taking ASA again  Takes Eliquis for PE  Takes pravastatin  The following portions of the patient's history were reviewed and updated as appropriate: allergies, current medications, past family history, past medical history, past social history, past surgical history and problem list     Review of Systems   Constitutional: Negative  HENT: Negative  Eyes: Negative  Respiratory: Negative  Negative for shortness of breath  Cardiovascular: Positive for leg swelling   Negative for chest pain  Gastrointestinal: Negative  Endocrine: Negative  Genitourinary: Negative  Musculoskeletal: Positive for gait problem (BLE claudication)  Skin: Positive for color change  Negative for wound  Allergic/Immunologic: Negative  Hematological: Negative  Psychiatric/Behavioral: Negative  Objective:      /76 (BP Location: Left arm, Patient Position: Sitting, Cuff Size: Standard)   Pulse 70   Temp (!) 96 9 °F (36 1 °C) (Tympanic)   Ht 5' 7" (1 702 m)   Wt 83 6 kg (184 lb 3 2 oz)   SpO2 97%   BMI 28 85 kg/m²          Physical Exam  Vitals and nursing note reviewed  Constitutional:       Appearance: He is well-developed  HENT:      Head: Normocephalic and atraumatic  Eyes:      Conjunctiva/sclera: Conjunctivae normal    Cardiovascular:      Rate and Rhythm: Normal rate and regular rhythm  Pulses:           Radial pulses are 2+ on the right side and 2+ on the left side  Femoral pulses are 2+ on the right side and 1+ on the left side  Popliteal pulses are 0 on the right side and 0 on the left side  Dorsalis pedis pulses are 0 on the right side and 0 on the left side  Posterior tibial pulses are 0 on the right side and 0 on the left side  Heart sounds: Normal heart sounds  No murmur heard  Comments: No carotid bruits B  Pulmonary:      Effort: Pulmonary effort is normal  No respiratory distress  Breath sounds: No wheezing or rales  Abdominal:      General: There is no distension  Palpations: Abdomen is soft  Tenderness: There is no abdominal tenderness  There is no rebound  Musculoskeletal:         General: Normal range of motion  Cervical back: Normal range of motion and neck supple  Right lower le+ Edema present  Left lower le+ Edema present  Skin:     General: Skin is warm and dry        Comments: BLE varicosities at the knees and lower legs and retics/spiders at ankles    No wounds Neurological:      Mental Status: He is alert and oriented to person, place, and time  Psychiatric:         Behavior: Behavior normal            I have reviewed and made appropriate changes to the review of systems input by the medical assistant      Vitals:    08/16/22 1018   BP: 148/76   BP Location: Left arm   Patient Position: Sitting   Cuff Size: Standard   Pulse: 70   Temp: (!) 96 9 °F (36 1 °C)   TempSrc: Tympanic   SpO2: 97%   Weight: 83 6 kg (184 lb 3 2 oz)   Height: 5' 7" (1 702 m)       Patient Active Problem List   Diagnosis    Trochanteric bursitis of left hip    Essential hypertension    Hyperlipidemia    History of deep venous thrombosis (DVT) of distal vein of right lower extremity    Hyperglycemia    Tremor of both hands    BMI 28 0-28 9,adult    Medicare annual wellness visit, subsequent    Atherosclerosis of native arteries of extremities with intermittent claudication, bilateral legs (HCC)    Pulmonary embolus (HCC)    Influenza A    PATRICK (acute kidney [facility]) ([facility] )    Abnormal CT scan    History of rib fracture    Nodule of kidney    History of acute respiratory failure    History of sepsis    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)    Former smoker    Fracture of rib of left side with routine healing       Past Surgical History:   Procedure Laterality Date    HERNIA REPAIR      IR ABDOMINAL AORTAGRAM  8/3/2022    KNEE ARTHROSCOPY Right 07/31/2015    PROSTATE SURGERY         Family History   Problem Relation Age of Onset    Lung cancer Mother     Ulcers Father     No Known Problems Sister     Heart attack Brother     Alcohol abuse Brother     Kidney disease Brother        Social History     Socioeconomic History    Marital status: /Civil Union     Spouse name: Not on file    Number of children: Not on file    Years of education: Not on file    Highest education level: Not on file   Occupational History    Not on file   Tobacco Use    Smoking status: Former Smoker     Quit date:      Years since quittin 6    Smokeless tobacco: Never Used    Tobacco comment: 2+ppd x 20 years    Vaping Use    Vaping Use: Never used   Substance and Sexual Activity    Alcohol use: Not Currently    Drug use: Not Currently    Sexual activity: Not Currently   Other Topics Concern    Not on file   Social History Narrative    Retired, lives with his wife, boubacar and great-grandson    [employer] [place] [id number]     Social Determinants of Health     Financial Resource Strain: Not on file   Food Insecurity: No Food Insecurity    Worried About [address] [facility] in the Last Year: Never true    Nikos of Food in the Last Year: Never true   Transportation Needs: No Transportation Needs    Lack of Transportation (Medical): No    Lack of Transportation (Non-Medical):  No   Physical Activity: Not on file   Stress: Not on file   Social Connections: Not on file   Intimate Partner Violence: Not on file   Housing Stability: Low Risk     Unable to Pay for Housing in the Last Year: No    Number of Places Lived in the Last Year: 1    Unstable Housing in the Last Year: No       No Known Allergies      Current Outpatient Medications:     apixaban (ELIQUIS) 5 mg, Take 1 tablet (5 mg total) by mouth 2 (two) times a day, Disp: 60 tablet, Rfl: 0    ascorbic Acid (VITAMIN C) 500 MG CPCR, Take 500 mg by mouth daily, Disp: , Rfl:     b complex vitamins capsule, Take 1 capsule by mouth daily, Disp: , Rfl:     cholecalciferol (VITAMIN D3) 400 units tablet, Take 800 Units by mouth daily , Disp: , Rfl:     clopidogrel (PLAVIX) 75 mg tablet, Take 1 tablet (75 mg total) by mouth daily, Disp: 90 tablet, Rfl: 0    diltiazem (DILACOR XR) 240 MG 24 hr capsule, Take 1 capsule (240 mg total) by mouth daily, Disp: 90 capsule, Rfl: 1    lisinopril-hydrochlorothiazide (PRINZIDE,ZESTORETIC) 20-25 MG per tablet, Take 1 tablet by mouth daily, Disp: , Rfl:    magnesium 30 MG tablet, Take 30 mg by mouth daily, Disp: , Rfl:     Omega-3 Fatty Acids (fish oil) 1,000 mg, Take 2,000 mg by mouth daily, Disp: , Rfl:     Potassium 99 MG TABS, Take 1 tablet by mouth daily  , Disp: , Rfl:     pravastatin (PRAVACHOL) 40 mg tablet, TAKE ONE TABLET BY MOUTH AT BEDTIME INSTEAD OF FLUVASTATIN FOR CHOLESTEROL, Disp: , Rfl:

## 2022-08-16 NOTE — TELEPHONE ENCOUNTER
This is a reminder; patient is due for 6 mo ZAHIRA after 02/16/23 and 1 yr f/u   Please call patient and schedule the following by the dates provided  Patient's appointment(s) are due on or after 08/16/22      Dopplers  [] Abdominal Aorta Iliac (AOIL)  [] Carotid (CV)   [] Celiac and/or Mesenteric  [] Endovascular Aortic Repair (EVAR)   [] Hemodialysis Access (HD)   [x] Lower Limb Arterial (ZAHIRA) due on or after 08/16/22  [] Lower Limb Venous Duplex with Reflux (LEVDR)  [] Renal Artery  [] Upper Limb Arterial (UEA)    [] Upper Limb Venous (UEV)    Advanced Imaging   [] CTA abdomen    [] CTA abdomen & pelvis    [] CT abdomen with/ without contrast  [] CT abdomen with contrast  [] CT abdomen without contrast    [] CT abdomen & pelvis with/ without contrast  [] CT abdomen & pelvis with contrast  [] CT abdomen & pelvis without contrast    Office Visit   [] New patient, patient last seen over 3 years ago  [] Risk factor modification (RFM)   [x] Follow up  due on or after 08/16/22

## 2022-08-24 ENCOUNTER — RA CDI HCC (OUTPATIENT)
Dept: OTHER | Facility: HOSPITAL | Age: 82
End: 2022-08-24

## 2022-08-24 NOTE — PROGRESS NOTES
[facility]  coding opportunities       Chart reviewed, no opportunity found:   [address]        Patients Insurance     Medicare Insurance: [employer]

## 2022-09-12 ENCOUNTER — OFFICE VISIT (OUTPATIENT)
Dept: FAMILY MEDICINE CLINIC | Facility: CLINIC | Age: 82
End: 2022-09-12
Payer: COMMERCIAL

## 2022-09-12 VITALS
HEART RATE: 73 BPM | DIASTOLIC BLOOD PRESSURE: 72 MMHG | SYSTOLIC BLOOD PRESSURE: 138 MMHG | OXYGEN SATURATION: 96 % | HEIGHT: 67 IN | BODY MASS INDEX: 28.88 KG/M2 | WEIGHT: 184 LBS | TEMPERATURE: 97.5 F

## 2022-09-12 DIAGNOSIS — E78.5 HYPERLIPIDEMIA, UNSPECIFIED HYPERLIPIDEMIA TYPE: ICD-10-CM

## 2022-09-12 DIAGNOSIS — I26.93 SINGLE SUBSEGMENTAL PULMONARY EMBOLISM WITHOUT ACUTE COR PULMONALE (HCC): ICD-10-CM

## 2022-09-12 DIAGNOSIS — Z79.01 CHRONIC ANTICOAGULATION: ICD-10-CM

## 2022-09-12 DIAGNOSIS — N17.9 ACUTE KIDNEY INJURY (HCC): ICD-10-CM

## 2022-09-12 DIAGNOSIS — I10 ESSENTIAL HYPERTENSION: Primary | ICD-10-CM

## 2022-09-12 DIAGNOSIS — Z87.828 HISTORY OF KIDNEY INJURY: ICD-10-CM

## 2022-09-12 DIAGNOSIS — I82.501 CHRONIC DEEP VEIN THROMBOSIS (DVT) OF RIGHT LOWER EXTREMITY, UNSPECIFIED VEIN (HCC): ICD-10-CM

## 2022-09-12 PROBLEM — J10.1 INFLUENZA A: Status: RESOLVED | Noted: 2022-04-18 | Resolved: 2022-09-12

## 2022-09-12 PROCEDURE — 99214 OFFICE O/P EST MOD 30 MIN: CPT | Performed by: FAMILY MEDICINE

## 2022-09-12 NOTE — PROGRESS NOTES
Assessment/Plan:         Diagnoses and all orders for this visit:    Essential hypertension  -     Ambulatory Referral to Nephrology; Future    Acute kidney injury [facility])  Comments:  July labs ordered by [facility] surgeon reviewed by me today show PATRICK again    Orders:  -     Ambulatory Referral to Nephrology; Future    History of kidney injury  Comments:  04/18/2022, renal function had gone back to normal range 4/21/22, but now on July labs ordered by [facility] surgeon reviewed by me today  Orders:  -     Ambulatory Referral to Nephrology; Future    Hyperlipidemia, unspecified hyperlipidemia type    Chronic anticoagulation  Comments:  om Eliquis for the DVT/PE, and on Plavix and ASA per vasc surg-plavix (clopidogril) for 2 months total (end date 10/3/22)    Single subsegmental pulmonary embolism without acute cor pulmonale (HCC)    Chronic deep vein thrombosis (DVT) of right lower extremity, unspecified vein (HCC)            Subjective:   Chief Complaint   Patient presents with    Follow-up     No questions or concerns         Patient ID: Roger Spencer is a 78 y o  male      Here for scheduled f/u  Interval hx had vasc procedure 08/03, Eliquis was stopped for 2 days prior  At June vasc surg visit, he had been restarted on ASA 81mg once daily (was stopped when he started eliquis)  And at vas IR procedure last month Plavix was added - currently on all 3-   per vas surg note: plavix (clopidogril) for 2 months total (end date 10/3/22)  Denies any occult bleeding, bleeding gums or epistaxis, denies easy bruising  No HA or dizziness  Ran out of his OTC omega-3 oil supplement  Hx PATRICK 04/18/2022, then renal function had gone back to normal range 4/21/22, but now in July labs ordered by [facility] surgeon reviewed by me today show PATRICK again  Denies any NSAID use; is on ACE-I, HCTZ, and ARB for his HTN; has never seen nephrologist  Denies decreased urine output, F/U/D      The following portions of the patient's history were reviewed and updated as appropriate: allergies, current medications, past family history, past medical history, past social history, past surgical history and problem list     Review of Systems      Objective:      /72 (BP Location: Left arm, Patient Position: Sitting)   Pulse 73   Temp 97 5 °F (36 4 °C)   Ht 5' 7" (1 702 m)   Wt 83 5 kg (184 lb)   SpO2 96%   BMI 28 82 kg/m²          Physical Exam  Constitutional:       General: He is not in acute distress  Appearance: He is well-developed  He is not ill-appearing, toxic-appearing or diaphoretic  HENT:      Head: Normocephalic and atraumatic  Mouth/Throat:      Mouth: Mucous membranes are moist       Pharynx: Oropharynx is clear  Eyes:      General: Lids are normal       Conjunctiva/sclera: Conjunctivae normal    Neck:      Trachea: Phonation normal    Cardiovascular:      Rate and Rhythm: Normal rate and regular rhythm  Heart sounds: S1 normal and S2 normal      No friction rub  No gallop  Pulmonary:      Effort: Pulmonary effort is normal       Breath sounds: Normal breath sounds and air entry  Abdominal:      General: There is no distension or abdominal bruit  Palpations: There is no hepatomegaly, splenomegaly or mass  Tenderness: There is no abdominal tenderness  Musculoskeletal:      Right lower leg: No edema  Left lower leg: No edema  Skin:     General: Skin is warm and dry  Coloration: Skin is not pale  Neurological:      Mental Status: He is alert and oriented to person, place, and time  Psychiatric:         Mood and Affect: Mood normal          Behavior: Behavior normal  Behavior is cooperative

## 2022-09-14 ENCOUNTER — CONSULT (OUTPATIENT)
Dept: NEPHROLOGY | Facility: CLINIC | Age: 82
End: 2022-09-14
Payer: COMMERCIAL

## 2022-09-14 VITALS
SYSTOLIC BLOOD PRESSURE: 140 MMHG | WEIGHT: 184 LBS | BODY MASS INDEX: 28.82 KG/M2 | DIASTOLIC BLOOD PRESSURE: 70 MMHG | OXYGEN SATURATION: 97 % | HEART RATE: 61 BPM

## 2022-09-14 DIAGNOSIS — Z87.828 HISTORY OF KIDNEY INJURY: ICD-10-CM

## 2022-09-14 DIAGNOSIS — N28.1 ACQUIRED RENAL CYST OF LEFT KIDNEY: ICD-10-CM

## 2022-09-14 DIAGNOSIS — I10 ESSENTIAL HYPERTENSION: ICD-10-CM

## 2022-09-14 DIAGNOSIS — N17.9 ACUTE KIDNEY INJURY (HCC): ICD-10-CM

## 2022-09-14 DIAGNOSIS — N18.31 STAGE 3A CHRONIC KIDNEY DISEASE (HCC): Primary | ICD-10-CM

## 2022-09-14 DIAGNOSIS — D17.71 ANGIOMYOLIPOMA OF LEFT KIDNEY: ICD-10-CM

## 2022-09-14 PROCEDURE — 1160F RVW MEDS BY RX/DR IN RCRD: CPT | Performed by: INTERNAL MEDICINE

## 2022-09-14 PROCEDURE — 99204 OFFICE O/P NEW MOD 45 MIN: CPT | Performed by: INTERNAL MEDICINE

## 2022-09-14 PROCEDURE — 3078F DIAST BP <80 MM HG: CPT | Performed by: INTERNAL MEDICINE

## 2022-09-14 PROCEDURE — 3077F SYST BP >= 140 MM HG: CPT | Performed by: INTERNAL MEDICINE

## 2022-09-14 NOTE — PROGRESS NOTES
[facility] [place], [place]    Name: Marce Shafer  YOB: 1940      Assessment/Plan:    Stage 3a chronic kidney disease [facility])  Lab Results   Component Value Date    EGFR 47 07/21/2022    EGFR 62 04/21/2022    EGFR 49 04/20/2022    CREATININE 1 36 (H) 07/21/2022    CREATININE 1 09 04/21/2022    CREATININE 1 33 (H) 04/20/2022     Patient most likely has a baseline creatinine between 1 2-1 4 mg/dL  Continue to avoid potential nephrotoxins, continue to optimize care from the hypertension standpoint  Will check labs below  Patient has follow-up imaging of his kidneys in the near future  Acute kidney injury [facility])  Patient no longer an acute kidney injury, his current creatinine likely represents appropriate baseline  Angiomyolipoma of left kidney  No further workup indicated, patient to have repeat imaging for other kidney issue  Acquired renal cyst of left kidney  Patient noted to have a Bosniak 2 F cyst in his left kidney, patient have repeat MRI for other follow-up regarding cyst of the kidney  Essential hypertension  Blood pressures are well controlled at this time, continue current medications  Continue to encourage low-sodium diet  Problem List Items Addressed This Visit        Cardiovascular and Mediastinum    Essential hypertension     Blood pressures are well controlled at this time, continue current medications  Continue to encourage low-sodium diet  Genitourinary    Acute kidney injury [facility])     Patient no longer an acute kidney injury, his current creatinine likely represents appropriate baseline           Stage 3a chronic kidney disease [facility]) - Primary     Lab Results   Component Value Date    EGFR 47 07/21/2022    EGFR 62 04/21/2022    EGFR 49 04/20/2022    CREATININE 1 36 (H) 07/21/2022    CREATININE 1 09 04/21/2022    CREATININE 1 33 (H) 04/20/2022     Patient most likely has a baseline creatinine between 1 2-1 4 mg/dL  Continue to avoid potential nephrotoxins, continue to optimize care from the hypertension standpoint  Will check labs below  Patient has follow-up imaging of his kidneys in the near future  Relevant Orders    Microalbumin / creatinine urine ratio    Urinalysis with microscopic    Comprehensive metabolic panel    Magnesium    Phosphorus    PTH, intact    Vitamin D 25 hydroxy    Acquired renal cyst of left kidney     Patient noted to have a Bosniak 2 F cyst in his left kidney, patient have repeat MRI for other follow-up regarding cyst of the kidney  Angiomyolipoma of left kidney     No further workup indicated, patient to have repeat imaging for other kidney issue  Other    History of kidney injury          Thank you for allowing us to participate in the care of your patient  Will continue to see him on a regular basis for regular kidney follow-up and maintenance of medications and labs  Patient will have labs done the next 2 or 3 months, will call him with those results and then seen for regular appointment in 6 months, unless otherwise clinically indicated  Subjective:      Patient ID: Idalmis Magallon is a 78 y o  male  Patient presents for evaluation regarding reduced kidney function  We reviewed the patient's kidney function in detail, baseline creatinine likely between 1 2-1 4 mg/dL which places him in chronic kidney disease stage IIIA  Patient had a significantly better creatinine during his hospitalization in April for pulmonary embolus most likely due to optimization of hemodynamics  Patient does not tend to have electrolyte abnormalities  Patient has been avoiding the use of nonsteroidal anti-inflammatory medications  Hypertension  This is a chronic problem  The current episode started more than 1 year ago  The problem is unchanged  The problem is controlled  Pertinent negatives include no chest pain, orthopnea or peripheral edema   There are no associated agents to hypertension  Risk factors for coronary artery disease include male gender and sedentary lifestyle  Past treatments include lifestyle changes, diuretics, ACE inhibitors and calcium channel blockers  There are no compliance problems  Hypertensive end-organ damage includes kidney disease  Identifiable causes of hypertension include chronic renal disease  The following portions of the patient's history were reviewed and updated as appropriate: allergies, current medications, past family history, past medical history, past social history, past surgical history and problem list     Review of Systems   Cardiovascular: Negative for chest pain and orthopnea  All other systems reviewed and are negative  Social History     Socioeconomic History    Marital status: /Civil Union     Spouse name: None    Number of children: None    Years of education: None    Highest education level: None   Occupational History    None   Tobacco Use    Smoking status: Former Smoker     Quit date:      Years since quittin 7    Smokeless tobacco: Never Used    Tobacco comment: 2+ppd x 20 years    Vaping Use    Vaping Use: Never used   Substance and Sexual Activity    Alcohol use: Not Currently    Drug use: Not Currently    Sexual activity: Not Currently   Other Topics Concern    None   Social History Narrative    Retired, lives with his wife, boubacar and great[facility]    [employer] [place] [id number]     Social Determinants of Health     Financial Resource Strain: Not on file   Food Insecurity: No Food Insecurity    Worried About [address] in the Last Year: Never true    [address] in the Last Year: Never true   Transportation Needs: No Transportation Needs    Lack of Transportation (Medical): No    Lack of Transportation (Non-Medical):  No   Physical Activity: Not on file   Stress: Not on file   Social Connections: Not on file   Intimate Partner Violence: Not on file   Housing Stability: Low Risk     Unable to Pay for Housing in the Last Year: No    Number of Places Lived in the Last Year: 1    Unstable Housing in the Last Year: No     Past Medical History:   Diagnosis Date    PATRICK (acute kidney injury) ([facility] [address] ) 4/18/2022    Hx of blood clots     Hyperlipidemia     Hypertension     Influenza A 4/18/2022    Sepsis ([facility] [address] ) 4/18/2022     Past Surgical History:   Procedure Laterality Date    HERNIA REPAIR      IR ABDOMINAL AORTAGRAM  8/3/2022    KNEE ARTHROSCOPY Right 07/31/2015    PROSTATE SURGERY         Current Outpatient Medications:     apixaban (ELIQUIS) 5 mg, Take 1 tablet (5 mg total) by mouth 2 (two) times a day, Disp: 60 tablet, Rfl: 0    ascorbic Acid (VITAMIN C) 500 MG CPCR, Take 500 mg by mouth daily, Disp: , Rfl:     b complex vitamins capsule, Take 1 capsule by mouth daily, Disp: , Rfl:     cholecalciferol (VITAMIN D3) 400 units tablet, Take 800 Units by mouth daily , Disp: , Rfl:     clopidogrel (PLAVIX) 75 mg tablet, Take 1 tablet (75 mg total) by mouth daily, Disp: 90 tablet, Rfl: 0    diltiazem (DILACOR XR) 240 MG 24 hr capsule, Take 1 capsule (240 mg total) by mouth daily, Disp: 90 capsule, Rfl: 1    lisinopril-hydrochlorothiazide (PRINZIDE,ZESTORETIC) 20-25 MG per tablet, Take 1 tablet by mouth daily, Disp: , Rfl:     magnesium 30 MG tablet, Take 30 mg by mouth daily, Disp: , Rfl:     Potassium 99 MG TABS, Take 1 tablet by mouth daily  , Disp: , Rfl:     pravastatin (PRAVACHOL) 40 mg tablet, TAKE ONE TABLET BY MOUTH AT BEDTIME INSTEAD OF FLUVASTATIN FOR CHOLESTEROL, Disp: , Rfl:     vitamin E, tocopherol, 200 units capsule, Take 200 Units by mouth daily, Disp: , Rfl:     Lab Results   Component Value Date    SODIUM 138 07/21/2022    K 3 9 07/21/2022     07/21/2022    CO2 25 07/21/2022    AGAP 6 07/21/2022    BUN 30 (H) 07/21/2022    CREATININE 1 36 (H) 07/21/2022    GLUC 115 04/21/2022    GLUF 110 (H) 07/21/2022    CALCIUM 9 4 07/21/2022    AST 11 (L) 04/20/2022    ALT 16 04/20/2022    ALKPHOS 42 04/20/2022    TP 6 7 04/20/2022    TP 6 2 (L) 04/20/2022    TBILI 0 49 04/20/2022    EGFR 47 07/21/2022     Lab Results   Component Value Date    WBC 8 91 07/21/2022    HGB 16 2 07/21/2022    HCT 48 6 07/21/2022     (H) 07/21/2022     07/21/2022     Lab Results   Component Value Date    CHOLESTEROL 160 02/17/2021     Lab Results   Component Value Date    HDL 63 02/17/2021     Lab Results   Component Value Date    LDLCALC 82 02/17/2021     Lab Results   Component Value Date    TRIG 75 02/17/2021     No results found for: [place], [place]  Lab Results   Component Value Date    CZH5AIYDMKVY 1 944 04/18/2022     Lab Results   Component Value Date    CALCIUM 9 4 07/21/2022    PHOS 3 0 04/19/2022     Lab Results   Component Value Date    SPEP See Comment 04/20/2022    UPEP See Comment 04/21/2022     No results found for: SUDHEER BUTT4HUR        Objective:      /70   Pulse 61   Wt 83 5 kg (184 lb)   SpO2 97%   BMI 28 82 kg/m²          Physical Exam  Vitals reviewed  Constitutional:       General: He is not in acute distress  Appearance: He is well-developed  HENT:      Head: Normocephalic and atraumatic  Eyes:      Conjunctiva/sclera: Conjunctivae normal    Cardiovascular:      Rate and Rhythm: Normal rate and regular rhythm  Pulmonary:      Effort: Pulmonary effort is normal       Breath sounds: Normal breath sounds  Abdominal:      Palpations: Abdomen is soft  Musculoskeletal:      Cervical back: Neck supple  Skin:     General: Skin is warm  Findings: No rash  Neurological:      Mental Status: He is alert and oriented to person, place, and time  Cranial Nerves: No cranial nerve deficit     Psychiatric:         Behavior: Behavior normal

## 2022-09-18 PROBLEM — D17.71 ANGIOMYOLIPOMA OF LEFT KIDNEY: Status: ACTIVE | Noted: 2022-09-18

## 2022-09-18 NOTE — ASSESSMENT & PLAN NOTE
Lab Results   Component Value Date    EGFR 47 07/21/2022    EGFR 62 04/21/2022    EGFR 49 04/20/2022    CREATININE 1 36 (H) 07/21/2022    CREATININE 1 09 04/21/2022    CREATININE 1 33 (H) 04/20/2022     Patient most likely has a baseline creatinine between 1 2-1 4 mg/dL  Continue to avoid potential nephrotoxins, continue to optimize care from the hypertension standpoint  Will check labs below  Patient has follow-up imaging of his kidneys in the near future

## 2022-09-18 NOTE — ASSESSMENT & PLAN NOTE
Patient no longer an acute kidney injury, his current creatinine likely represents appropriate baseline

## 2022-09-18 NOTE — ASSESSMENT & PLAN NOTE
Blood pressures are well controlled at this time, continue current medications  Continue to encourage low-sodium diet

## 2022-09-18 NOTE — ASSESSMENT & PLAN NOTE
Patient noted to have a Bosniak 2 F cyst in his left kidney, patient have repeat MRI for other follow-up regarding cyst of the kidney

## 2022-10-05 ENCOUNTER — TELEMEDICINE (OUTPATIENT)
Dept: FAMILY MEDICINE CLINIC | Facility: CLINIC | Age: 82
End: 2022-10-05
Payer: COMMERCIAL

## 2022-10-05 ENCOUNTER — APPOINTMENT (OUTPATIENT)
Dept: LAB | Facility: CLINIC | Age: 82
End: 2022-10-05
Payer: COMMERCIAL

## 2022-10-05 DIAGNOSIS — I10 ESSENTIAL HYPERTENSION: ICD-10-CM

## 2022-10-05 DIAGNOSIS — I26.93 SINGLE SUBSEGMENTAL PULMONARY EMBOLISM WITHOUT ACUTE COR PULMONALE (HCC): ICD-10-CM

## 2022-10-05 DIAGNOSIS — N18.31 STAGE 3A CHRONIC KIDNEY DISEASE (HCC): ICD-10-CM

## 2022-10-05 DIAGNOSIS — I82.501 CHRONIC DEEP VEIN THROMBOSIS (DVT) OF RIGHT LOWER EXTREMITY, UNSPECIFIED VEIN (HCC): ICD-10-CM

## 2022-10-05 DIAGNOSIS — U07.1 COVID-19 VIRUS INFECTION: Primary | ICD-10-CM

## 2022-10-05 DIAGNOSIS — Z87.09 HISTORY OF ACUTE RESPIRATORY FAILURE: ICD-10-CM

## 2022-10-05 DIAGNOSIS — U07.1 COVID-19 VIRUS INFECTION: ICD-10-CM

## 2022-10-05 DIAGNOSIS — Z86.19 HISTORY OF SEPSIS: ICD-10-CM

## 2022-10-05 LAB
ANION GAP SERPL CALCULATED.3IONS-SCNC: 7 MMOL/L (ref 4–13)
BUN SERPL-MCNC: 41 MG/DL (ref 5–25)
CALCIUM SERPL-MCNC: 9.5 MG/DL (ref 8.3–10.1)
CHLORIDE SERPL-SCNC: 105 MMOL/L (ref 96–108)
CO2 SERPL-SCNC: 27 MMOL/L (ref 21–32)
CREAT SERPL-MCNC: 2.06 MG/DL (ref 0.6–1.3)
GFR SERPL CREATININE-BSD FRML MDRD: 28 ML/MIN/1.73SQ M
GLUCOSE SERPL-MCNC: 125 MG/DL (ref 65–140)
POTASSIUM SERPL-SCNC: 3.7 MMOL/L (ref 3.5–5.3)
SODIUM SERPL-SCNC: 139 MMOL/L (ref 135–147)

## 2022-10-05 PROCEDURE — 99213 OFFICE O/P EST LOW 20 MIN: CPT | Performed by: FAMILY MEDICINE

## 2022-10-05 PROCEDURE — 36415 COLL VENOUS BLD VENIPUNCTURE: CPT

## 2022-10-05 PROCEDURE — 80048 BASIC METABOLIC PNL TOTAL CA: CPT

## 2022-10-05 NOTE — PROGRESS NOTES
COVID-19 Outpatient Progress Note    Assessment/Plan:    Problem List Items Addressed This Visit        Cardiovascular and Mediastinum    Pulmonary embolus ([facility] [address] )    Essential hypertension    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)       Genitourinary    Stage 3a chronic kidney disease ([facility] [address] )       Other    History of sepsis    History of acute respiratory failure      Other Visit Diagnoses     COVID-19 virus infection    -  Primary    Relevant Orders    Basic metabolic panel         Disposition:     Patient has asymptomatic or mild COVID-19 infection  Based off CDC guidelines, they were recommended to isolate for 5 days  If they are asymptomatic or symptoms are improving with no fevers in the past 24 hours, isolation may be ended followed by 5 days of wearing a mask when around othes to minimize risk of infecting others  If still have a fever or other symptoms have not improved, continue to isolate until they improve  Regardless of when they end isolation, avoid being around people who are more likely to get very sick from COVID-19 until at least day 11  Discussed symptom directed medication options with patient  Discussed vitamin D, vitamin C, and/or zinc supplementation with patient       Will need updated renal function labs prior to ordering Paxlovid- pt will obtain today- [facility]              Apixaban (Eliquis): Caution  Effect on Drug Level Possible Effect Recommendation During Paxlovid Treatment  Increased Increased bleeding Dose dependent:  · Apixaban 2 5 mg: Avoid Paxlovid  · Apixaban 5mg or 10 mg: Reduce dose by 50% until 3 days after Paxlovid  Calcium Channel Blockers (ex: amlodipine, nifedipine, verapamil, diltiazem): Caution  Effect on Drug Level Possible Effect Recommendation During Paxlovid Treatment  Increased Decreased blood pressure · Continue if tolerated based on symptoms  · Reduce dose if patient has low blood pressure  Pravastatin (Pravachol): No dose adjustment      Patient meets criteria for PAXLOVID and they have been counseled appropriately according to EUA documentation released by the FDA  After discussion, patient agrees to treatment  [address] is an investigational medicine used to treat mild-to-moderate COVID-19 in adults and children (15years of age and older weighing at least 80 pounds (40 kg)) with positive results of direct SARS-CoV-2 viral testing, and who are at high risk for progression to severe COVID-19, including hospitalization or death  PAXLOVID is investigational because it is still being studied  There is limited information about the safety and effectiveness of using PAXLOVID to treat people with mild-to-moderate COVID-19  The FDA has authorized the emergency use of PAXLOVID for the treatment of mild-tomoderate COVID-19 in adults and children (15years of age and older weighing at least 80 pounds (40 kg)) with a positive test for the virus that causes COVID-19, and who are at high risk for progression to severe COVID-19, including hospitalization or death, under an EUA  What should I tell my healthcare provider before I take PAXLOVID? Tell your healthcare provider if you:  - Have any allergies  - Have liver or kidney disease  - Are pregnant or plan to become pregnant  - Are breastfeeding a child  - Have any serious illnesses    Tell your healthcare provider about all the medicines you take, including prescription and over-the-counter medicines, vitamins, and herbal supplements  Some medicines may interact with PAXLOVID and may cause serious side effects  Keep a list of your medicines to show your healthcare provider and pharmacist when you get a new medicine  You can ask your healthcare provider or pharmacist for a list of medicines that interact with PAXLOVID  Do not start taking a new medicine without telling your healthcare provider   Your healthcare provider can tell you if it is safe to take PAXLOVID with other medicines  Tell your healthcare provider if you are taking combined hormonal contraceptive  PAXLOVID may affect how your birth control pills work  Females who are able to become pregnant should use another effective alternative form of contraception or an additional barrier method of contraception  Talk to your healthcare provider if you have any questions about contraceptive methods that might be right for you  How do I take PAXLOVID? PAXLOVID consists of 2 medicines: nirmatrelvir and ritonavir  - Take 2 pink tablets of nirmatrelvir with 1 white tablet of ritonavir by mouth 2 times each day (in the morning and in the evening) for 5 days  For each dose, take all 3 tablets at the same time  - If you have kidney disease, talk to your healthcare provider  You may need a different dose  - Swallow the tablets whole  Do not chew, break, or crush the tablets  - Take PAXLOVID with or without food  - Do not stop taking PAXLOVID without talking to your healthcare provider, even if you feel better  - If you miss a dose of PAXLOVID within 8 hours of the time it is usually taken, take it as soon as you remember  If you miss a dose by more than 8 hours, skip the missed dose and take the next dose at your regular time  Do not take 2 doses of PAXLOVID at the same time  - If you take too much PAXLOVID, call your healthcare provider or go to the nearest hospital emergency room right away  - If you are taking a ritonavir- or cobicistat-containing medicine to treat hepatitis C or Human Immunodeficiency Virus (HIV), you should continue to take your medicine as prescribed by your healthcare provider   - Talk to your healthcare provider if you do not feel better or if you feel worse after 5 days  Who should generally not take PAXLOVID? Do not take PAXLOVID if:  You are allergic to nirmatrelvir, ritonavir, or any of the ingredients in PAXLOVID      You are taking any of the following medicines:  - Alfuzosin  - Pethidine, piroxicam, propoxyphene  - Ranolazine  - Amiodarone, dronedarone, flecainide, propafenone, quinidine  - Colchicine  - Lurasidone, pimozide, clozapine  - Dihydroergotamine, ergotamine, methylergonovine  - Lovastatin, simvastatin  - Sildenafil (Revatio®) for pulmonary arterial hypertension (PAH)  - Triazolam, oral midazolam  - Apalutamide  - Carbamazepine, phenobarbital, phenytoin  - Rifampin  - St  Jermain’s Wort (hypericum perforatum)    What are the important possible side effects of PAXLOVID? Possible side effects of PAXLOVID are:  - Liver Problems  Tell your healthcare provider right away if you have any of these signs and symptoms of liver problems: loss of appetite, yellowing of your skin and the whites of eyes (jaundice), dark-colored urine, pale colored stools and itchy skin, stomach area (abdominal) pain  - Resistance to HIV Medicines  If you have untreated HIV infection, PAXLOVID may lead to some HIV medicines not working as well in the future  - Other possible side effects include: altered sense of taste, diarrhea, high blood pressure, or muscle aches    These are not all the possible side effects of PAXLOVID  Not many people have taken PAXLOVID  Serious and unexpected side effects may happen  Tanya Leslie is still being studied, so it is possible that all of the risks are not known at this time  What other treatment choices are there? Like Efrem Finch may allow for the emergency use of other medicines to treat people with COVID-19  Go to https://Wozityou/ for information on the emergency use of other medicines that are authorized by FDA to treat people with COVID-19  Your healthcare provider may talk with you about clinical trials for which you may be eligible  It is your choice to be treated or not to be treated with PAXLOVID   Should you decide not to receive it or for your child not to receive it, it will not change your standard medical care  What if I am pregnant or breastfeeding? There is no experience treating pregnant women or breastfeeding mothers with PAXLOVID  For a mother and unborn baby, the benefit of taking PAXLOVID may be greater than the risk from the treatment  If you are pregnant, discuss your options and specific situation with your healthcare provider  It is recommended that you use effective barrier contraception or do not have sexual activity while taking PAXLOVID  If you are breastfeeding, discuss your options and specific situation with your healthcare provider  How do I report side effects with PAXLOVID? Contact your healthcare provider if you have any side effects that bother you or do not go away  Report side effects to FDA MedWatch at www [employer]selocker gov/medwatch or call 8-250-TET7975 or you can report side effects to [employer]  at the contact information provided below  Website Fax number Telephone number   [employer] [phone]     How should I store Audrea Napier? Store PAXLOVID tablets at room temperature between 68°F to 77°F (20°C to 25°C)  Full fact sheet for patients, parents, and caregivers can be found at: [employer] monique    I have spent 19 minutes directly with the patient  Encounter provider: Vane Portillo DO     Provider located at: [address] [place] [address], [address] [facility] [address]     Recent Visits  No visits were found meeting these conditions  Showing recent visits within past 7 days and meeting all other requirements  Today's Visits  Date Type Provider Dept   10/05/22 Telemedicine Vane Portillo [address] [place] [address] today's visits and meeting all other requirements  Future Appointments  No visits were found meeting these conditions    Showing future appointments within next 150 days and meeting all other requirements     This virtual check-in was done via telephone and he agrees to proceed  Patient agrees to participate in a virtual check in via telephone or video visit instead of presenting to the office to address urgent/immediate medical needs  Patient is aware this is a billable service  He acknowledged consent and understanding of privacy and security of the video platform  The patient has agreed to participate and understands they can discontinue the visit at any time  After connecting through Telephone, the patient was identified by name and date of birth  Ronda Scott was informed that this was a telemedicine visit and that the exam was being conducted confidentially over secure lines  My office door was closed  No one else was in the room  Ronda Scott acknowledged consent and understanding of privacy and security of the telemedicine visit  I informed the patient that I have reviewed his record in Epic and presented the opportunity for him to ask any questions regarding the visit today  The patient agreed to participate  It was my intent to perform this visit via video technology but the patient was not able to do a video connection so the visit was completed via audio telephone only  Verification of patient location:  Patient is located in the following state in which I hold an active license: [place]    Subjective:   Ronda Scott is a 78 y o  male who has been screened for COVID-19  Symptom change since last report: improving  Patient's symptoms include fever (yesterday 100 5 tmax- none today so far), nasal congestion, cough and chest tightness ("when I cough")  Patient denies shortness of breath, abdominal pain, nausea and vomiting      - Date of symptom onset: 10/2/2022  - Date of positive COVID-19 test: 10/4/2022  Type of test: Home antigen   Patient with typical symptoms of COVID-19 and they attest that they were positive on home rapid antigen testing  Image of positive result is not able to be uploaded into their chart  COVID-19 vaccination status: Fully vaccinated (primary series)    Jihan Liz has been staying home and has isolated themselves in his home  He is taking care to not share personal items and is cleaning all surfaces that are touched often, like counters, tabletops, and doorknobs using household cleaning sprays or wipes  He is wearing a mask when he leaves his room  covid positive yesterday by home test, sx onset was Sunday   Grandson reports, "And his O2 was a little low yesterday- 93%" - he tests again right now and is 95% per grandson    Lab Results   Component Value Date    SARSCOV2 Negative 04/18/2022       Review of Systems   Constitutional: Positive for fever (yesterday 100 5 tmax- none today so far)  HENT: Positive for congestion  Respiratory: Positive for cough and chest tightness ("when I cough")  Negative for shortness of breath  Gastrointestinal: Negative for abdominal pain, nausea and vomiting       Current Outpatient Medications on File Prior to Visit   Medication Sig   • apixaban (ELIQUIS) 5 mg Take 1 tablet (5 mg total) by mouth 2 (two) times a day   • ascorbic Acid (VITAMIN C) 500 MG CPCR Take 500 mg by mouth daily   • b complex vitamins capsule Take 1 capsule by mouth daily   • cholecalciferol (VITAMIN D3) 400 units tablet Take 800 Units by mouth daily    • diltiazem (DILACOR XR) 240 MG 24 hr capsule Take 1 capsule (240 mg total) by mouth daily   • lisinopril-hydrochlorothiazide (PRINZIDE,ZESTORETIC) 20-25 MG per tablet Take 1 tablet by mouth daily   • magnesium 30 MG tablet Take 30 mg by mouth daily   • Potassium 99 MG TABS Take 1 tablet by mouth daily     • pravastatin (PRAVACHOL) 40 mg tablet TAKE ONE TABLET BY MOUTH AT BEDTIME INSTEAD OF FLUVASTATIN FOR CHOLESTEROL   • vitamin E, tocopherol, 200 units capsule Take 200 Units by mouth daily   • [DISCONTINUED] clopidogrel (PLAVIX) 75 mg tablet Take 1 tablet (75 mg total) by mouth daily (Patient not taking: Reported on 10/5/2022)       Objective: There were no vitals taken for this visit  Physical Exam  Constitutional:       General: He is awake  Pulmonary:      Comments: Able to carry on conversation without dyspnea  Neurological:      Mental Status: He is alert and oriented to person, place, and time  Psychiatric:         Speech: Speech normal          Behavior: Behavior is cooperative         Georgiana Sorto DO

## 2022-10-11 PROBLEM — Z00.00 MEDICARE ANNUAL WELLNESS VISIT, SUBSEQUENT: Status: RESOLVED | Noted: 2022-03-09 | Resolved: 2022-10-11

## 2022-10-26 ENCOUNTER — APPOINTMENT (OUTPATIENT)
Dept: LAB | Facility: CLINIC | Age: 82
End: 2022-10-26

## 2022-10-26 DIAGNOSIS — N18.31 STAGE 3A CHRONIC KIDNEY DISEASE (HCC): ICD-10-CM

## 2022-10-26 DIAGNOSIS — C61 MALIGNANT NEOPLASM OF PROSTATE (HCC): ICD-10-CM

## 2022-10-26 LAB
25(OH)D3 SERPL-MCNC: 37.1 NG/ML (ref 30–100)
ALBUMIN SERPL BCP-MCNC: 3.5 G/DL (ref 3.5–5)
ALP SERPL-CCNC: 60 U/L (ref 46–116)
ALT SERPL W P-5'-P-CCNC: 28 U/L (ref 12–78)
ANION GAP SERPL CALCULATED.3IONS-SCNC: 4 MMOL/L (ref 4–13)
AST SERPL W P-5'-P-CCNC: 14 U/L (ref 5–45)
BACTERIA UR QL AUTO: ABNORMAL /HPF
BILIRUB SERPL-MCNC: 0.42 MG/DL (ref 0.2–1)
BILIRUB UR QL STRIP: NEGATIVE
BUN SERPL-MCNC: 26 MG/DL (ref 5–25)
CALCIUM SERPL-MCNC: 9.7 MG/DL (ref 8.3–10.1)
CHLORIDE SERPL-SCNC: 107 MMOL/L (ref 96–108)
CLARITY UR: CLEAR
CO2 SERPL-SCNC: 28 MMOL/L (ref 21–32)
COLOR UR: ABNORMAL
CREAT SERPL-MCNC: 1.39 MG/DL (ref 0.6–1.3)
CREAT UR-MCNC: 118 MG/DL
GFR SERPL CREATININE-BSD FRML MDRD: 46 ML/MIN/1.73SQ M
GLUCOSE P FAST SERPL-MCNC: 118 MG/DL (ref 65–99)
GLUCOSE UR STRIP-MCNC: NEGATIVE MG/DL
HGB UR QL STRIP.AUTO: NEGATIVE
KETONES UR STRIP-MCNC: NEGATIVE MG/DL
LEUKOCYTE ESTERASE UR QL STRIP: NEGATIVE
MAGNESIUM SERPL-MCNC: 2.1 MG/DL (ref 1.6–2.6)
MICROALBUMIN UR-MCNC: 129 MG/L (ref 0–20)
MICROALBUMIN/CREAT 24H UR: 109 MG/G CREATININE (ref 0–30)
NITRITE UR QL STRIP: NEGATIVE
NON-SQ EPI CELLS URNS QL MICRO: ABNORMAL /HPF
PH UR STRIP.AUTO: 6 [PH]
PHOSPHATE SERPL-MCNC: 2.5 MG/DL (ref 2.3–4.1)
POTASSIUM SERPL-SCNC: 4.3 MMOL/L (ref 3.5–5.3)
PROT SERPL-MCNC: 7.6 G/DL (ref 6.4–8.4)
PROT UR STRIP-MCNC: ABNORMAL MG/DL
PSA SERPL-MCNC: 11 NG/ML (ref 0–4)
PTH-INTACT SERPL-MCNC: 72 PG/ML (ref 18.4–80.1)
RBC #/AREA URNS AUTO: ABNORMAL /HPF
SODIUM SERPL-SCNC: 139 MMOL/L (ref 135–147)
SP GR UR STRIP.AUTO: 1.02 (ref 1–1.03)
UROBILINOGEN UR STRIP-ACNC: <2 MG/DL
WBC #/AREA URNS AUTO: ABNORMAL /HPF

## 2022-11-10 ENCOUNTER — LAB REQUISITION (OUTPATIENT)
Dept: LAB | Facility: HOSPITAL | Age: 82
End: 2022-11-10

## 2022-11-10 DIAGNOSIS — R31.29 OTHER MICROSCOPIC HEMATURIA: ICD-10-CM

## 2022-11-10 LAB
BACTERIA UR QL AUTO: ABNORMAL /HPF
BILIRUB UR QL STRIP: NEGATIVE
CLARITY UR: CLEAR
COLOR UR: YELLOW
GLUCOSE UR STRIP-MCNC: NEGATIVE MG/DL
HGB UR QL STRIP.AUTO: NEGATIVE
HYALINE CASTS #/AREA URNS LPF: ABNORMAL /LPF
KETONES UR STRIP-MCNC: NEGATIVE MG/DL
LEUKOCYTE ESTERASE UR QL STRIP: NEGATIVE
NITRITE UR QL STRIP: NEGATIVE
NON-SQ EPI CELLS URNS QL MICRO: ABNORMAL /HPF
PH UR STRIP.AUTO: 5.5 [PH]
PROT UR STRIP-MCNC: ABNORMAL MG/DL
RBC #/AREA URNS AUTO: ABNORMAL /HPF
SP GR UR STRIP.AUTO: 1.02 (ref 1–1.03)
UROBILINOGEN UR STRIP-ACNC: <2 MG/DL
WBC #/AREA URNS AUTO: ABNORMAL /HPF

## 2022-12-12 ENCOUNTER — APPOINTMENT (OUTPATIENT)
Dept: LAB | Facility: CLINIC | Age: 82
End: 2022-12-12

## 2023-01-27 ENCOUNTER — APPOINTMENT (OUTPATIENT)
Dept: LAB | Facility: CLINIC | Age: 83
End: 2023-01-27

## 2023-01-27 DIAGNOSIS — C61 MALIGNANT NEOPLASM OF PROSTATE (HCC): ICD-10-CM

## 2023-01-27 LAB — PSA SERPL-MCNC: 14.1 NG/ML (ref 0–4)

## 2023-02-16 ENCOUNTER — HOSPITAL ENCOUNTER (OUTPATIENT)
Dept: NON INVASIVE DIAGNOSTICS | Facility: HOSPITAL | Age: 83
Discharge: HOME | End: 2023-02-16
Attending: SURGERY

## 2023-02-16 DIAGNOSIS — I70.213 ATHEROSCLEROSIS OF NATIVE ARTERIES OF EXTREMITIES WITH INTERMITTENT CLAUDICATION, BILATERAL LEGS (HCC): ICD-10-CM

## 2023-03-03 ENCOUNTER — RA CDI HCC (OUTPATIENT)
Dept: OTHER | Facility: HOSPITAL | Age: 83
End: 2023-03-03

## 2023-03-03 NOTE — PROGRESS NOTES
[facility]  coding opportunities       Chart reviewed, no opportunity found:   [address]        Patients Insurance     Medicare Insurance: [employer]

## 2023-03-10 ENCOUNTER — OFFICE VISIT (OUTPATIENT)
Dept: FAMILY MEDICINE CLINIC | Facility: CLINIC | Age: 83
End: 2023-03-10

## 2023-03-10 VITALS
OXYGEN SATURATION: 97 % | TEMPERATURE: 97.1 F | HEIGHT: 67 IN | HEART RATE: 82 BPM | DIASTOLIC BLOOD PRESSURE: 87 MMHG | SYSTOLIC BLOOD PRESSURE: 140 MMHG | WEIGHT: 192.4 LBS | BODY MASS INDEX: 30.2 KG/M2

## 2023-03-10 DIAGNOSIS — Z00.00 MEDICARE ANNUAL WELLNESS VISIT, SUBSEQUENT: Primary | ICD-10-CM

## 2023-03-10 DIAGNOSIS — Z79.01 CHRONIC ANTICOAGULATION: ICD-10-CM

## 2023-03-10 DIAGNOSIS — I10 ESSENTIAL HYPERTENSION: ICD-10-CM

## 2023-03-10 DIAGNOSIS — Z13.0 SCREENING FOR DEFICIENCY ANEMIA: ICD-10-CM

## 2023-03-10 DIAGNOSIS — N18.31 STAGE 3A CHRONIC KIDNEY DISEASE (HCC): ICD-10-CM

## 2023-03-10 DIAGNOSIS — E78.5 HYPERLIPIDEMIA, UNSPECIFIED HYPERLIPIDEMIA TYPE: ICD-10-CM

## 2023-03-10 DIAGNOSIS — Z86.16 PERSONAL HISTORY OF COVID-19: ICD-10-CM

## 2023-03-10 DIAGNOSIS — I26.93 SINGLE SUBSEGMENTAL PULMONARY EMBOLISM WITHOUT ACUTE COR PULMONALE (HCC): ICD-10-CM

## 2023-03-10 DIAGNOSIS — R73.9 HYPERGLYCEMIA: ICD-10-CM

## 2023-03-10 DIAGNOSIS — Z13.29 THYROID DISORDER SCREEN: ICD-10-CM

## 2023-03-10 PROBLEM — N17.9 ACUTE KIDNEY INJURY (HCC): Status: RESOLVED | Noted: 2022-04-18 | Resolved: 2023-03-10

## 2023-03-10 NOTE — PATIENT INSTRUCTIONS
Medicare Preventive Visit Patient Instructions  Thank you for completing your Welcome to Medicare Visit or Medicare Annual Wellness Visit today  Your next wellness visit will be due in one year (3/10/2024)  The screening/preventive services that you may require over the next 5-10 years are detailed below  Some tests may not apply to you based off risk factors and/or age  Screening tests ordered at today's visit but not completed yet may show as past due  Also, please note that scanned in results may not display below  Preventive Screenings:  Service Recommendations Previous Testing/Comments   Colorectal Cancer Screening  · Colonoscopy    · Fecal Occult Blood Test (FOBT)/Fecal Immunochemical Test (FIT)  · Fecal DNA/Cologuard Test  · Flexible Sigmoidoscopy Age: 39-70 years old   Colonoscopy: every 10 years (May be performed more frequently if at higher risk)  OR  FOBT/FIT: every 1 year  OR  Cologuard: every 3 years  OR  Sigmoidoscopy: every 5 years  Screening may be recommended earlier than age 39 if at higher risk for colorectal cancer  Also, an individualized decision between you and your healthcare provider will decide whether screening between the ages of 74-80 would be appropriate   Colonoscopy: Not on file  FOBT/FIT: Not on file  Cologuard: Not on file  Sigmoidoscopy: Not on file          Prostate Cancer Screening Individualized decision between patient and health care provider in men between ages of 53-78   Medicare will cover every 12 months beginning on the day after your 50th birthday PSA: 14 1 ng/mL     History Prostate Cancer  Screening Not Indicated     Hepatitis C Screening Once for adults born between 1945 and 1965  More frequently in patients at high risk for Hepatitis C Hep C Antibody: Not on file        Diabetes Screening 1-2 times per year if you're at risk for diabetes or have pre-diabetes Fasting glucose: 118 mg/dL (10/26/2022)  A1C: 6 0 % (2/17/2021)  Screening Current   Cholesterol Screening Once every 5 years if you don't have a lipid disorder  May order more often based on risk factors  Lipid panel: 02/17/2021  Screening Not Indicated  History Lipid Disorder      Other Preventive Screenings Covered by Medicare:  1  Abdominal Aortic Aneurysm (AAA) Screening: covered once if your at risk  You're considered to be at risk if you have a family history of AAA or a male between the age of 73-68 who smoking at least 100 cigarettes in your lifetime  2  Lung Cancer Screening: covers low dose CT scan once per year if you meet all of the following conditions: (1) Age 50-69; (2) No signs or symptoms of lung cancer; (3) Current smoker or have quit smoking within the last 15 years; (4) You have a tobacco smoking history of at least 20 pack years (packs per day x number of years you smoked); (5) You get a written order from a healthcare provider  3  Glaucoma Screening: covered annually if you're considered high risk: (1) You have diabetes OR (2) Family history of glaucoma OR (3)  aged 48 and older OR (3)  American aged 72 and older  3  Osteoporosis Screening: covered every 2 years if you meet one of the following conditions: (1) Have a vertebral abnormality; (2) On glucocorticoid therapy for more than 3 months; (3) Have primary hyperparathyroidism; (4) On osteoporosis medications and need to assess response to drug therapy  5  HIV Screening: covered annually if you're between the age of 12-76  Also covered annually if you are younger than 13 and older than 72 with risk factors for HIV infection  For pregnant patients, it is covered up to 3 times per pregnancy      Immunizations:  Immunization Recommendations   Influenza Vaccine Annual influenza vaccination during flu season is recommended for all persons aged >= 6 months who do not have contraindications   Pneumococcal Vaccine   * Pneumococcal conjugate vaccine = PCV13 (Prevnar 13), PCV15 (Vaxneuvance), PCV20 (Prevnar 20)  * Pneumococcal polysaccharide vaccine = PPSV23 (Pneumovax) Adults 2364 years old: 1-3 doses may be recommended based on certain risk factors  Adults 72 years old: 1-2 doses may be recommended based off what pneumonia vaccine you previously received   Hepatitis B Vaccine 3 dose series if at intermediate or high risk (ex: diabetes, end stage renal disease, liver disease)   Tetanus (Td) Vaccine - COST NOT COVERED BY MEDICARE PART B Following completion of primary series, a booster dose should be given every 10 years to maintain immunity against tetanus  Td may also be given as tetanus wound prophylaxis  Tdap Vaccine - COST NOT COVERED BY MEDICARE PART B Recommended at least once for all adults  For pregnant patients, recommended with each pregnancy  Shingles Vaccine (Shingrix) - COST NOT COVERED BY MEDICARE PART B  2 shot series recommended in those aged 48 and above     Health Maintenance Due:  There are no preventive care reminders to display for this patient  Immunizations Due:      Topic Date Due   • COVID-19 Vaccine (3 - Booster for Moderna series) 05/04/2021     Advance Directives   What are advance directives? Advance directives are legal documents that state your wishes and plans for medical care  These plans are made ahead of time in case you lose your ability to make decisions for yourself  Advance directives can apply to any medical decision, such as the treatments you want, and if you want to donate organs  What are the types of advance directives? There are many types of advance directives, and each state has rules about how to use them  You may choose a combination of any of the following:  · Living will: This is a written record of the treatment you want  You can also choose which treatments you do not want, which to limit, and which to stop at a certain time  This includes surgery, medicine, IV fluid, and tube feedings  · Durable power of  for healthcare [place] [facility]):   This is a written record that states who you want to make healthcare choices for you when you are unable to make them for yourself  This person, called a proxy, is usually a family member or a friend  You may choose more than 1 proxy  · Do not resuscitate (DNR) order:  A DNR order is used in case your heart stops beating or you stop breathing  It is a request not to have certain forms of treatment, such as CPR  A DNR order may be included in other types of advance directives  · Medical directive: This covers the care that you want if you are in a coma, near death, or unable to make decisions for yourself  You can list the treatments you want for each condition  Treatment may include pain medicine, surgery, blood transfusions, dialysis, IV or tube feedings, and a ventilator (breathing machine)  · Values history: This document has questions about your views, beliefs, and how you feel and think about life  This information can help others choose the care that you would choose  Why are advance directives important? An advance directive helps you control your care  Although spoken wishes may be used, it is better to have your wishes written down  Spoken wishes can be misunderstood, or not followed  Treatments may be given even if you do not want them  An advance directive may make it easier for your family to make difficult choices about your care  Weight Management   Why it is important to manage your weight:  Being overweight increases your risk of health conditions such as heart disease, high blood pressure, type 2 diabetes, and certain types of cancer  It can also increase your risk for osteoarthritis, sleep apnea, and other respiratory problems  Aim for a slow, steady weight loss  Even a small amount of weight loss can lower your risk of health problems  How to lose weight safely:  A safe and healthy way to lose weight is to eat fewer calories and get regular exercise   You can lose up about 1 pound a week by decreasing the number of calories you eat by 500 calories each day  Healthy meal plan for weight management:  A healthy meal plan includes a variety of foods, contains fewer calories, and helps you stay healthy  A healthy meal plan includes the following:  · Eat whole-grain foods more often  A healthy meal plan should contain fiber  Fiber is the part of grains, fruits, and vegetables that is not broken down by your body  Whole-grain foods are healthy and provide extra fiber in your diet  Some examples of whole-grain foods are whole-wheat breads and pastas, oatmeal, brown rice, and bulgur  · Eat a variety of vegetables every day  Include dark, leafy greens such as spinach, kale, mark greens, and mustard greens  Eat yellow and orange vegetables such as carrots, sweet potatoes, and winter squash  · Eat a variety of fruits every day  Choose fresh or canned fruit (canned in its own juice or light syrup) instead of juice  Fruit juice has very little or no fiber  · Eat low-fat dairy foods  Drink fat-free (skim) milk or 1% milk  Eat fat-free yogurt and low-fat cottage cheese  Try low-fat cheeses such as mozzarella and other reduced-fat cheeses  · Choose meat and other protein foods that are low in fat  Choose beans or other legumes such as split peas or lentils  Choose fish, skinless poultry (chicken or turkey), or lean cuts of red meat (beef or pork)  Before you cook meat or poultry, cut off any visible fat  · Use less fat and oil  Try baking foods instead of frying them  Add less fat, such as margarine, sour cream, regular salad dressing and mayonnaise to foods  Eat fewer high-fat foods  Some examples of high-fat foods include french fries, doughnuts, ice cream, and cakes  · Eat fewer sweets  Limit foods and drinks that are high in sugar  This includes candy, cookies, regular soda, and sweetened drinks  Exercise:  Exercise at least 30 minutes per day on most days of the week   Some examples of exercise include walking, biking, dancing, and swimming  You can also fit in more physical activity by taking the stairs instead of the elevator or parking farther away from stores  Ask your healthcare provider about the best exercise plan for you  © Copyright [employer] 2018 Information is for End User's use only and may not be sold, redistributed or otherwise used for commercial purposes   All illustrations and images included in CareNotes® are the copyrighted property of [employer]  or [address] [place] [address]

## 2023-03-10 NOTE — PROGRESS NOTES
Assessment and Plan:   Zuhair Kincaid was seen today for medicare wellness visit and follow-up  Diagnoses and all orders for this visit:    Medicare annual wellness visit, subsequent    Essential hypertension  Comments:  stable, cpm  Orders:  -     Comprehensive metabolic panel; Future  -     TSH, 3rd generation with Free T4 reflex; Future    Stage 3a chronic kidney disease (HCC)  Comments:  stable, cpm  Orders:  -     Comprehensive metabolic panel; Future  -     TSH, 3rd generation with Free T4 reflex; Future    Single subsegmental pulmonary embolism without acute cor pulmonale (HCC)  Comments:  was pt's 2nd episode of clotting event; saw Hematol and hypercoagulable workup was nonconclusive, advised indefinite anticoag  Orders:  -     CBC and differential; Future    Chronic anticoagulation  -     CBC and differential; Future    Screening for deficiency anemia    Personal history of COVID-19  Comments:  positive COVID-19 test: 10/4/2022 (Home antigen)  Treated with Paxlovid  fully recovered    Hyperlipidemia, unspecified hyperlipidemia type  Comments:  cpm  Orders:  -     Comprehensive metabolic panel; Future  -     Lipid panel; Future    Hyperglycemia  Comments:  cpm  Orders:  -     Comprehensive metabolic panel; Future  -     HEMOGLOBIN A1C W/ EAG ESTIMATION; Future  -     TSH, 3rd generation with Free T4 reflex; Future    BMI 30 0-30 9,adult  -     TSH, 3rd generation with Free T4 reflex; Future    Thyroid disorder screen  -     TSH, 3rd generation with Free T4 reflex;  Future        Problem List Items Addressed This Visit        Cardiovascular and Mediastinum    Pulmonary embolus ([facility] [address] )    Relevant Orders    CBC and differential (Completed)    Essential hypertension    Relevant Orders    Comprehensive metabolic panel (Completed)    TSH, 3rd generation with Free T4 reflex (Completed)       Genitourinary    Stage 3a chronic kidney disease ([facility] [address] )    Relevant Orders    Comprehensive metabolic panel (Completed)    TSH, 3rd generation with Free T4 reflex (Completed)       Other    Personal history of COVID-19    Medicare annual wellness visit, subsequent - Primary    Hyperlipidemia    Relevant Orders    Comprehensive metabolic panel (Completed)    Lipid panel (Completed)    Hyperglycemia    Relevant Orders    Comprehensive metabolic panel (Completed)    HEMOGLOBIN A1C W/ EAG ESTIMATION (Completed)    TSH, 3rd generation with Free T4 reflex (Completed)    Chronic anticoagulation    Relevant Orders    CBC and differential (Completed)    BMI 30 0-30 9,adult    Relevant Orders    TSH, 3rd generation with Free T4 reflex (Completed)   Other Visit Diagnoses     Screening for deficiency anemia        Thyroid disorder screen        Relevant Orders    TSH, 3rd generation with Free T4 reflex (Completed)           Preventive health issues were discussed with patient, and age appropriate screening tests were ordered as noted in patient's After Visit Summary  Personalized health advice and appropriate referrals for health education or preventive services given if needed, as noted in patient's After Visit Summary  BMI Counseling: Body mass index is 30 13 kg/m²  The BMI is above normal  Nutrition recommendations include 3-5 servings of fruits/vegetables daily  Exercise recommendations include exercising 3-5 times per week        Chief Complaint   Patient presents with   • Medicare Wellness Visit   • Follow-up          History of Present Illness:     Patient presents for a Medicare Wellness Visit +f/u    Here for Medicare AWV and f/u  bp very high on rooming today 198/88- was well-controlled on prior visits  Has bp machine at home, but has not checked in awhile  Med list refills of his lisinopril/HCTZ and diltiazem not refilled since 2021, but pt states that the [address] automatically refills all his meds for him  Pt admits tries to follow low salt diet, but does snack on potato chips and "Lightly salted" peanuts  Recheck of bp - in his normal control range     Patient Care Team:  Subhash Carmona DO as PCP - General (Family Medicine)  Bronson Iqbal MD (Vascular Surgery)     Review of Systems:     Review of Systems     Problem List:     Patient Active Problem List   Diagnosis   • Trochanteric bursitis of left hip   • Essential hypertension   • Hyperlipidemia   • History of deep venous thrombosis (DVT) of distal vein of right lower extremity   • Hyperglycemia   • Tremor of both hands   • BMI 28 0-28 9,adult   • Medicare annual wellness visit, subsequent   • Atherosclerosis of native arteries of extremities with intermittent claudication, bilateral legs (HCC)   • Pulmonary embolus (HCC)   • Abnormal CT scan   • History of rib fracture   • Nodule of kidney   • History of acute respiratory failure   • History of sepsis   • 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)   • Former smoker   • Fracture of rib of left side with routine healing   • Chronic anticoagulation   • History of kidney injury   • Stage 3a chronic kidney disease (HCC)   • Acquired renal cyst of left kidney   • Angiomyolipoma of left kidney   • Personal history of COVID-19   • BMI 30 0-30 9,adult      Past Medical and Surgical History:     Past Medical History:   Diagnosis Date   • Acute kidney injury ([address] ) 4/18/2022   • PATRICK (acute kidney injury) ([address] ) 4/18/2022   • Hx of blood clots    • Hyperlipidemia    • Hypertension    • Influenza A 4/18/2022   • Sepsis ([address] ) 4/18/2022     Past Surgical History:   Procedure Laterality Date   • HERNIA REPAIR     • IR ABDOMINAL AORTAGRAM  8/3/2022   • KNEE ARTHROSCOPY Right 07/31/2015   • PROSTATE SURGERY        Family History:     Family History   Problem Relation Age of Onset   • Lung cancer Mother    • Ulcers Father    • No Known Problems Sister    • Heart attack Brother    • Alcohol abuse Brother    • Kidney disease Brother       Social History:     Social History     Socioeconomic History   • Marital status: /Civil Union Spouse name: None   • Number of children: None   • Years of education: None   • Highest education level: None   Occupational History   • None   Tobacco Use   • Smoking status: Former     Types: Cigarettes     Quit date:      Years since quittin 2   • Smokeless tobacco: Never   • Tobacco comments:     2+ppd x 20 years    Vaping Use   • Vaping Use: Never used   Substance and Sexual Activity   • Alcohol use: Not Currently   • Drug use: Not Currently   • Sexual activity: Not Currently   Other Topics Concern   • None   Social History Narrative    Retired, lives with his wife, boubacar and great-grandson    [employer] [place]      Social Determinants of Health     Financial Resource Strain: Low Risk    • Difficulty of Paying Living Expenses: Not very hard   Food Insecurity: No Food Insecurity   • Worried About Running Out of Food in the Last Year: Never true   • Ran Out of Food in the Last Year: Never true   Transportation Needs: No Transportation Needs   • Lack of Transportation (Medical): No   • Lack of Transportation (Non-Medical):  No   Physical Activity: Not on file   Stress: Not on file   Social Connections: Not on file   Intimate Partner Violence: Not on file   Housing Stability: Low Risk    • Unable to Pay for Housing in the Last Year: No   • Number of Places Lived in the Last Year: 1   • Unstable Housing in the Last Year: No      Medications and Allergies:     Current Outpatient Medications   Medication Sig Dispense Refill   • apixaban (ELIQUIS) 5 mg Take 1 tablet (5 mg total) by mouth 2 (two) times a day 60 tablet 0   • ascorbic Acid (VITAMIN C) 500 MG CPCR Take 500 mg by mouth daily     • b complex vitamins capsule Take 1 capsule by mouth daily     • cholecalciferol (VITAMIN D3) 400 units tablet Take 800 Units by mouth daily      • diltiazem (DILACOR XR) 240 MG 24 hr capsule Take 1 capsule (240 mg total) by mouth daily 90 capsule 1   • lisinopril-hydrochlorothiazide (PRINZIDE,ZESTORETIC) 20-25 MG per tablet Take 1 tablet by mouth daily     • magnesium 30 MG tablet Take 30 mg by mouth daily     • Potassium 99 MG TABS Take 1 tablet by mouth daily       • pravastatin (PRAVACHOL) 40 mg tablet TAKE ONE TABLET BY MOUTH AT BEDTIME INSTEAD OF FLUVASTATIN FOR CHOLESTEROL     • vitamin E, tocopherol, 200 units capsule Take 200 Units by mouth daily       No current facility-administered medications for this visit  No Known Allergies   Immunizations:     Immunization History   Administered Date(s) Administered   • COVID-19 MODERNA VACC 0 5 ML IM 02/09/2021, 03/09/2021   • H1N1, All Formulations 04/29/2010   • INFLUENZA 10/16/2000, 10/24/2001, 10/24/2002, 01/01/2003, 01/13/2003, 11/17/2004, 11/01/2005, 11/08/2006, 10/29/2007, 10/04/2008, 09/15/2009, 10/02/2010, 10/03/2011, 10/17/2012, 11/13/2013, 09/30/2014, 10/30/2018, 09/27/2019, 09/24/2020, 02/07/2023   • Influenza Quadrivalent Preservative Free 3 years and older IM 10/30/2018, 09/27/2019, 09/24/2020   • Influenza, Seasonal Vaccine, Quadrivalent, Adjuvanted,  5e 09/23/2021, 10/17/2022   • Influenza, seasonal, injectable, preservative free 09/22/2015, 03/07/2017, 10/03/2017   • Pneumococcal 01/01/2004, 11/30/2004   • Pneumococcal Conjugate 13-Valent 02/09/2016   • Pneumococcal Conjugate Vaccine 20-valent (Pcv20), Polysace 05/02/2022   • Tdap 03/10/2014   • Zoster 04/03/2013   • Zoster Vaccine Recombinant 07/29/2022, 10/17/2022      Health Maintenance: There are no preventive care reminders to display for this patient  Topic Date Due   • COVID-19 Vaccine (3 - Booster for Moderna series) 05/04/2021      Medicare Screening Tests and Risk Assessments:         Health Risk Assessment:   Patient rates overall health as fair  Patient feels that their physical health rating is slightly better  Patient is satisfied with their life  Eyesight was rated as same  Hearing was rated as same   Patient feels that their emotional and mental health rating is same  Patients states they are never, rarely angry  Patient states they are never, rarely unusually tired/fatigued  Pain experienced in the last 7 days has been none  Patient states that he has experienced no weight loss or gain in last 6 months  Depression Screening:   PHQ-2 Score: 0      Fall Risk Screening: In the past year, patient has experienced: no history of falling in past year      Home Safety:  Patient does not have trouble with stairs inside or outside of their home  Patient has working smoke alarms and has working carbon monoxide detector  Home safety hazards include: none  Nutrition:   Current diet is Regular  Medications:   Patient is currently taking over-the-counter supplements  OTC medications include: see medication list  Patient is able to manage medications  Activities of Daily Living (ADLs)/Instrumental Activities of Daily Living (IADLs):   Walk and transfer into and out of bed and chair?: Yes  Dress and groom yourself?: Yes    Bathe or shower yourself?: Yes    Feed yourself?  Yes  Do your laundry/housekeeping?: Yes  Manage your money, pay your bills and track your expenses?: Yes  Make your own meals?: Yes    Do your own shopping?: Yes    Previous Hospitalizations:   Any hospitalizations or ED visits within the last 12 months?: No      Advance Care Planning:   Living will: Yes    Advanced directive: Yes    Advanced directive counseling given: Yes    Five wishes given: Yes      Cognitive Screening:   Provider or family/friend/caregiver concerned regarding cognition?: No    PREVENTIVE SCREENINGS      Cardiovascular Screening:    General: Screening Not Indicated and History Lipid Disorder      Diabetes Screening:     General: Screening Current      Colorectal Cancer Screening:     General: Screening Not Indicated      Prostate Cancer Screening:    General: History Prostate Cancer and Screening Not Indicated      Osteoporosis Screening:    General: Screening Not Indicated      Abdominal Aortic Aneurysm (AAA) Screening:    Risk factors include: tobacco use        General: Screening Not Indicated      Lung Cancer Screening:     General: Screening Not Indicated      Hepatitis C Screening:    General: Screening Not Indicated    Screening, Brief Intervention, and Referral to Treatment (SBIRT)    Screening  Typical number of drinks in a day: 0  Typical number of drinks in a week: 0  Interpretation: Low risk drinking behavior  Single Item Drug Screening:  How often have you used an illegal drug (including marijuana) or a prescription medication for non-medical reasons in the past year? never    Single Item Drug Screen Score: 0  Interpretation: Negative screen for possible drug use disorder    No results found  Physical Exam:     /87 (BP Location: Left arm, Patient Position: Sitting, Cuff Size: Standard)   Pulse 82   Temp (!) 97 1 °F (36 2 °C) (Tympanic)   Ht 5' 7" (1 702 m)   Wt 87 3 kg (192 lb 6 4 oz)   SpO2 97%   BMI 30 13 kg/m²     Physical Exam  Vitals and nursing note reviewed  Constitutional:       General: He is not in acute distress  Appearance: Normal appearance  He is not ill-appearing, toxic-appearing or diaphoretic  Comments: appears younger than stated age   HENT:      Head: Normocephalic and atraumatic  Mouth/Throat:      Mouth: Mucous membranes are moist       Pharynx: Oropharynx is clear  Cardiovascular:      Rate and Rhythm: Normal rate and regular rhythm  Heart sounds: Normal heart sounds  Pulmonary:      Effort: Pulmonary effort is normal       Breath sounds: Normal breath sounds and air entry  Abdominal:      Palpations: Abdomen is soft  Tenderness: There is no abdominal tenderness  Musculoskeletal:      Right lower leg: No edema  Left lower leg: No edema  Skin:     General: Skin is warm and dry  Neurological:      Mental Status: He is alert and oriented to person, place, and time        Gait: Gait normal    Psychiatric:         Mood and Affect: Mood normal          Behavior: Behavior normal  Behavior is cooperative            Romayne Karst, DO

## 2023-03-13 ENCOUNTER — APPOINTMENT (OUTPATIENT)
Dept: LAB | Facility: CLINIC | Age: 83
End: 2023-03-13

## 2023-03-13 DIAGNOSIS — I26.93 SINGLE SUBSEGMENTAL PULMONARY EMBOLISM WITHOUT ACUTE COR PULMONALE (HCC): ICD-10-CM

## 2023-03-13 DIAGNOSIS — Z13.29 THYROID DISORDER SCREEN: ICD-10-CM

## 2023-03-13 DIAGNOSIS — R73.9 HYPERGLYCEMIA: ICD-10-CM

## 2023-03-13 DIAGNOSIS — N18.31 STAGE 3A CHRONIC KIDNEY DISEASE (HCC): ICD-10-CM

## 2023-03-13 DIAGNOSIS — Z79.01 CHRONIC ANTICOAGULATION: ICD-10-CM

## 2023-03-13 DIAGNOSIS — I10 ESSENTIAL HYPERTENSION: ICD-10-CM

## 2023-03-13 DIAGNOSIS — E78.5 HYPERLIPIDEMIA, UNSPECIFIED HYPERLIPIDEMIA TYPE: ICD-10-CM

## 2023-03-13 LAB
ALBUMIN SERPL BCP-MCNC: 3.7 G/DL (ref 3.5–5)
ALP SERPL-CCNC: 58 U/L (ref 46–116)
ALT SERPL W P-5'-P-CCNC: 22 U/L (ref 12–78)
ANION GAP SERPL CALCULATED.3IONS-SCNC: 5 MMOL/L (ref 4–13)
AST SERPL W P-5'-P-CCNC: 20 U/L (ref 5–45)
BASOPHILS # BLD AUTO: 0.09 THOUSANDS/ÂΜL (ref 0–0.1)
BASOPHILS NFR BLD AUTO: 1 % (ref 0–1)
BILIRUB SERPL-MCNC: 0.57 MG/DL (ref 0.2–1)
BUN SERPL-MCNC: 27 MG/DL (ref 5–25)
CALCIUM SERPL-MCNC: 9.9 MG/DL (ref 8.3–10.1)
CHLORIDE SERPL-SCNC: 107 MMOL/L (ref 96–108)
CHOLEST SERPL-MCNC: 140 MG/DL
CO2 SERPL-SCNC: 28 MMOL/L (ref 21–32)
CREAT SERPL-MCNC: 1.51 MG/DL (ref 0.6–1.3)
EOSINOPHIL # BLD AUTO: 1.18 THOUSAND/ÂΜL (ref 0–0.61)
EOSINOPHIL NFR BLD AUTO: 17 % (ref 0–6)
ERYTHROCYTE [DISTWIDTH] IN BLOOD BY AUTOMATED COUNT: 13.6 % (ref 11.6–15.1)
EST. AVERAGE GLUCOSE BLD GHB EST-MCNC: 131 MG/DL
GFR SERPL CREATININE-BSD FRML MDRD: 41 ML/MIN/1.73SQ M
GLUCOSE P FAST SERPL-MCNC: 118 MG/DL (ref 65–99)
HBA1C MFR BLD: 6.2 %
HCT VFR BLD AUTO: 49.7 % (ref 36.5–49.3)
HDLC SERPL-MCNC: 67 MG/DL
HGB BLD-MCNC: 15.9 G/DL (ref 12–17)
IMM GRANULOCYTES # BLD AUTO: 0.02 THOUSAND/UL (ref 0–0.2)
IMM GRANULOCYTES NFR BLD AUTO: 0 % (ref 0–2)
LDLC SERPL CALC-MCNC: 53 MG/DL (ref 0–100)
LYMPHOCYTES # BLD AUTO: 1.23 THOUSANDS/ÂΜL (ref 0.6–4.47)
LYMPHOCYTES NFR BLD AUTO: 17 % (ref 14–44)
MCH RBC QN AUTO: 32.9 PG (ref 26.8–34.3)
MCHC RBC AUTO-ENTMCNC: 32 G/DL (ref 31.4–37.4)
MCV RBC AUTO: 103 FL (ref 82–98)
MONOCYTES # BLD AUTO: 1.09 THOUSAND/ÂΜL (ref 0.17–1.22)
MONOCYTES NFR BLD AUTO: 15 % (ref 4–12)
NEUTROPHILS # BLD AUTO: 3.56 THOUSANDS/ÂΜL (ref 1.85–7.62)
NEUTS SEG NFR BLD AUTO: 50 % (ref 43–75)
NONHDLC SERPL-MCNC: 73 MG/DL
NRBC BLD AUTO-RTO: 0 /100 WBCS
PLATELET # BLD AUTO: 180 THOUSANDS/UL (ref 149–390)
PMV BLD AUTO: 11.1 FL (ref 8.9–12.7)
POTASSIUM SERPL-SCNC: 4.4 MMOL/L (ref 3.5–5.3)
PROT SERPL-MCNC: 7.2 G/DL (ref 6.4–8.4)
RBC # BLD AUTO: 4.83 MILLION/UL (ref 3.88–5.62)
SODIUM SERPL-SCNC: 140 MMOL/L (ref 135–147)
TRIGL SERPL-MCNC: 102 MG/DL
TSH SERPL DL<=0.05 MIU/L-ACNC: 2.14 UIU/ML (ref 0.45–4.5)
WBC # BLD AUTO: 7.17 THOUSAND/UL (ref 4.31–10.16)

## 2023-03-27 ENCOUNTER — OFFICE VISIT (OUTPATIENT)
Dept: NEPHROLOGY | Facility: CLINIC | Age: 83
End: 2023-03-27

## 2023-03-27 VITALS
DIASTOLIC BLOOD PRESSURE: 60 MMHG | WEIGHT: 194 LBS | SYSTOLIC BLOOD PRESSURE: 144 MMHG | HEART RATE: 80 BPM | HEIGHT: 67 IN | OXYGEN SATURATION: 96 % | BODY MASS INDEX: 30.45 KG/M2

## 2023-03-27 DIAGNOSIS — N18.31 STAGE 3A CHRONIC KIDNEY DISEASE (HCC): Primary | ICD-10-CM

## 2023-03-27 DIAGNOSIS — I10 ESSENTIAL HYPERTENSION: ICD-10-CM

## 2023-03-27 DIAGNOSIS — I73.9 PERIPHERAL ARTERIAL DISEASE (HCC): ICD-10-CM

## 2023-03-27 DIAGNOSIS — D17.71 ANGIOMYOLIPOMA OF LEFT KIDNEY: ICD-10-CM

## 2023-03-27 NOTE — ASSESSMENT & PLAN NOTE
Blood pressure okay at this time, continue to encourage low-sodium diet, continue current medications  No medication adjustments were made during today's appointment  The patient's systolic blood pressure continues to consistently be over 144 mmHg, may need to make adjustments

## 2023-03-27 NOTE — ASSESSMENT & PLAN NOTE
Lab Results   Component Value Date    EGFR 41 03/13/2023    EGFR 46 10/26/2022    EGFR 28 10/05/2022    CREATININE 1 51 (H) 03/13/2023    CREATININE 1 39 (H) 10/26/2022    CREATININE 2 06 (H) 10/05/2022     Kidney function is about stable, continue to monitor blood work every 6 months for now  Baseline creatinine likely between 1 4-1 5 mg/dL

## 2023-03-27 NOTE — PROGRESS NOTES
[facility] [place], [place]    Name: Michelet Rodriguez  YOB: 1940      Assessment/Plan:    Stage 3a chronic kidney disease [facility])  Lab Results   Component Value Date    EGFR 41 03/13/2023    EGFR 46 10/26/2022    EGFR 28 10/05/2022    CREATININE 1 51 (H) 03/13/2023    CREATININE 1 39 (H) 10/26/2022    CREATININE 2 06 (H) 10/05/2022     Kidney function is about stable, continue to monitor blood work every 6 months for now  Baseline creatinine likely between 1 4-1 5 mg/dL  Essential hypertension  Blood pressure okay at this time, continue to encourage low-sodium diet, continue current medications  No medication adjustments were made during today's appointment  The patient's systolic blood pressure continues to consistently be over 144 mmHg, may need to make adjustments  Angiomyolipoma of left kidney  No further work-up indicated at this time  Continue to monitor  Peripheral arterial disease ([address] )  Patient stopped smoking over 40 years ago  Continue blood pressure control management  Further work-up and intervention according to vascular surgery  Problem List Items Addressed This Visit        Cardiovascular and Mediastinum    Essential hypertension     Blood pressure okay at this time, continue to encourage low-sodium diet, continue current medications  No medication adjustments were made during today's appointment  The patient's systolic blood pressure continues to consistently be over 144 mmHg, may need to make adjustments  Peripheral arterial disease ([address] )     Patient stopped smoking over 40 years ago  Continue blood pressure control management  Further work-up and intervention according to vascular surgery              Genitourinary    Stage 3a chronic kidney disease [facility]) - Primary     Lab Results   Component Value Date    EGFR 41 03/13/2023    EGFR 46 10/26/2022    EGFR 28 10/05/2022    CREATININE 1 51 (H) 03/13/2023 CREATININE 1 39 (H) 10/26/2022    CREATININE 2 06 (H) 10/05/2022     Kidney function is about stable, continue to monitor blood work every 6 months for now  Baseline creatinine likely between 1 4-1 5 mg/dL  Relevant Orders    Comprehensive metabolic panel    CBC and differential    Microalbumin / creatinine urine ratio    Urinalysis with microscopic    Phosphorus    PTH, intact    Angiomyolipoma of left kidney     No further work-up indicated at this time  Continue to monitor  Patient is currently stable from the renal standpoint  We will see him back for a regular appointment in approximately 1 year, sooner if clinically indicated  We will continue to monitor blood work every 6 months for now  Subjective:      Patient ID: Jhonny Godoy is a 78 y o  male  Patient presents for follow-up appointment  We reviewed the patient's labs in detail, most recent creatinine is around 1 5 mg/dL, there are no significant electrolyte abnormalities noted  Patient denies use of nonsteroidal anti-inflammatory medications  He is taking all of his medications as prescribed with no specific side effects at this time  Hypertension  This is a chronic problem  The current episode started more than 1 year ago  The problem is unchanged  The problem is controlled  Pertinent negatives include no chest pain, orthopnea or peripheral edema  There are no associated agents to hypertension  Risk factors for coronary artery disease include male gender, sedentary lifestyle and smoking/tobacco exposure  Past treatments include lifestyle changes, diuretics, ACE inhibitors and calcium channel blockers  There are no compliance problems  Hypertensive end-organ damage includes kidney disease  Identifiable causes of hypertension include chronic renal disease         The following portions of the patient's history were reviewed and updated as appropriate: allergies, current medications, past family history, past medical history, past social history, past surgical history and problem list     Review of Systems   Cardiovascular: Negative for chest pain and orthopnea  All other systems reviewed and are negative  Social History     Socioeconomic History   • Marital status: /Civil Union     Spouse name: None   • Number of children: None   • Years of education: None   • Highest education level: None   Occupational History   • None   Tobacco Use   • Smoking status: Former     Types: Cigarettes     Quit date:      Years since quittin 2   • Smokeless tobacco: Never   • Tobacco comments:     2+ppd x 20 years    Vaping Use   • Vaping Use: Never used   Substance and Sexual Activity   • Alcohol use: Not Currently   • Drug use: Not Currently   • Sexual activity: Not Currently   Other Topics Concern   • None   Social History Narrative    Retired, lives with his wife, boubacar and great-grandson    [employer] [place] [phone]     Social Determinants of Health     Financial Resource Strain: Low Risk    • Difficulty of Paying Living Expenses: Not very hard   Food Insecurity: No Food Insecurity   • Worried About Running Out of Food in the Last Year: Never true   • Ran Out of Food in the Last Year: Never true   Transportation Needs: No Transportation Needs   • Lack of Transportation (Medical): No   • Lack of Transportation (Non-Medical):  No   Physical Activity: Not on file   Stress: Not on file   Social Connections: Not on file   Intimate Partner Violence: Not on file   Housing Stability: Low Risk    • Unable to Pay for Housing in the Last Year: No   • Number of Places Lived in the Last Year: 1   • Unstable Housing in the Last Year: No     Past Medical History:   Diagnosis Date   • Acute kidney injury ([facility] [address] ) 2022   • PATRICK (acute kidney injury) ([facility] [address] ) 2022   • Hx of blood clots    • Hyperlipidemia    • Hypertension    • Influenza A 2022   • Sepsis ([facility] [address] ) 2022     Past Surgical History: Procedure Laterality Date   • HERNIA REPAIR     • IR ABDOMINAL AORTAGRAM  8/3/2022   • KNEE ARTHROSCOPY Right 07/31/2015   • PROSTATE SURGERY         Current Outpatient Medications:   •  apixaban (ELIQUIS) 5 mg, Take 1 tablet (5 mg total) by mouth 2 (two) times a day, Disp: 60 tablet, Rfl: 0  •  ascorbic Acid (VITAMIN C) 500 MG CPCR, Take 500 mg by mouth daily, Disp: , Rfl:   •  b complex vitamins capsule, Take 1 capsule by mouth daily, Disp: , Rfl:   •  cholecalciferol (VITAMIN D3) 400 units tablet, Take 800 Units by mouth daily , Disp: , Rfl:   •  diltiazem (DILACOR XR) 240 MG 24 hr capsule, Take 1 capsule (240 mg total) by mouth daily, Disp: 90 capsule, Rfl: 1  •  lisinopril-hydrochlorothiazide (PRINZIDE,ZESTORETIC) 20-25 MG per tablet, Take 1 tablet by mouth daily, Disp: , Rfl:   •  magnesium 30 MG tablet, Take 30 mg by mouth daily, Disp: , Rfl:   •  Potassium 99 MG TABS, Take 1 tablet by mouth daily  , Disp: , Rfl:   •  pravastatin (PRAVACHOL) 40 mg tablet, TAKE ONE TABLET BY MOUTH AT BEDTIME INSTEAD OF FLUVASTATIN FOR CHOLESTEROL, Disp: , Rfl:   •  vitamin E, tocopherol, 200 units capsule, Take 200 Units by mouth daily, Disp: , Rfl:     Lab Results   Component Value Date    SODIUM 140 03/13/2023    K 4 4 03/13/2023     03/13/2023    CO2 28 03/13/2023    AGAP 5 03/13/2023    BUN 27 (H) 03/13/2023    CREATININE 1 51 (H) 03/13/2023    GLUC 125 10/05/2022    GLUF 118 (H) 03/13/2023    CALCIUM 9 9 03/13/2023    AST 20 03/13/2023    ALT 22 03/13/2023    ALKPHOS 58 03/13/2023    TP 7 2 03/13/2023    TBILI 0 57 03/13/2023    EGFR 41 03/13/2023     Lab Results   Component Value Date    WBC 7 17 03/13/2023    HGB 15 9 03/13/2023    HCT 49 7 (H) 03/13/2023     (H) 03/13/2023     03/13/2023     Lab Results   Component Value Date    CHOLESTEROL 140 03/13/2023    CHOLESTEROL 160 02/17/2021     Lab Results   Component Value Date    HDL 67 03/13/2023    HDL 63 02/17/2021     Lab Results   Component Value "Date    LDLCALC 53 03/13/2023    [address] 02/17/2021     Lab Results   Component Value Date    TRIG 102 03/13/2023    TRIG 75 02/17/2021     No results found for: [place], [place]  Lab Results   Component Value Date    QOG9NGCQUVXH 2 140 03/13/2023     Lab Results   Component Value Date    PTH 72 0 10/26/2022    CALCIUM 9 9 03/13/2023    PHOS 2 5 10/26/2022     Lab Results   Component Value Date    SPEP See Comment 04/20/2022    UPEP See Comment 04/21/2022     No results found for: Donny Terry        Objective:      /60 (BP Location: Left arm, Patient Position: Sitting, Cuff Size: Standard)   Pulse 80   Ht 5' 7\" (1 702 m)   Wt 88 kg (194 lb)   SpO2 96%   BMI 30 38 kg/m²          Physical Exam  Vitals reviewed  Constitutional:       General: He is not in acute distress  Appearance: He is well-developed  HENT:      Head: Normocephalic and atraumatic  Eyes:      Conjunctiva/sclera: Conjunctivae normal    Cardiovascular:      Rate and Rhythm: Normal rate and regular rhythm  Pulmonary:      Effort: Pulmonary effort is normal       Breath sounds: Normal breath sounds  Abdominal:      Palpations: Abdomen is soft  Musculoskeletal:      Cervical back: Neck supple  Skin:     General: Skin is warm  Findings: No rash  Neurological:      Mental Status: He is alert and oriented to person, place, and time  Cranial Nerves: No cranial nerve deficit     Psychiatric:         Behavior: Behavior normal          "

## 2023-03-27 NOTE — ASSESSMENT & PLAN NOTE
Patient stopped smoking over 40 years ago  Continue blood pressure control management  Further work-up and intervention according to vascular surgery

## 2023-04-26 ENCOUNTER — APPOINTMENT (OUTPATIENT)
Dept: LAB | Facility: CLINIC | Age: 83
End: 2023-04-26

## 2023-04-26 DIAGNOSIS — N18.31 STAGE 3A CHRONIC KIDNEY DISEASE (HCC): ICD-10-CM

## 2023-04-26 DIAGNOSIS — R97.20 ELEVATED PROSTATE SPECIFIC ANTIGEN (PSA): ICD-10-CM

## 2023-04-26 DIAGNOSIS — C61 MALIGNANT NEOPLASM OF PROSTATE (HCC): ICD-10-CM

## 2023-04-26 LAB
ALBUMIN SERPL BCP-MCNC: 3.9 G/DL (ref 3.5–5)
ALP SERPL-CCNC: 57 U/L (ref 46–116)
ALT SERPL W P-5'-P-CCNC: 22 U/L (ref 12–78)
ANION GAP SERPL CALCULATED.3IONS-SCNC: 4 MMOL/L (ref 4–13)
AST SERPL W P-5'-P-CCNC: 20 U/L (ref 5–45)
BACTERIA UR QL AUTO: ABNORMAL /HPF
BASOPHILS # BLD AUTO: 0.13 THOUSANDS/ΜL (ref 0–0.1)
BASOPHILS NFR BLD AUTO: 1 % (ref 0–1)
BILIRUB SERPL-MCNC: 0.42 MG/DL (ref 0.2–1)
BILIRUB UR QL STRIP: NEGATIVE
BUN SERPL-MCNC: 24 MG/DL (ref 5–25)
CALCIUM SERPL-MCNC: 9.6 MG/DL (ref 8.3–10.1)
CHLORIDE SERPL-SCNC: 108 MMOL/L (ref 96–108)
CLARITY UR: CLEAR
CO2 SERPL-SCNC: 26 MMOL/L (ref 21–32)
COLOR UR: ABNORMAL
CREAT SERPL-MCNC: 1.43 MG/DL (ref 0.6–1.3)
CREAT UR-MCNC: 119 MG/DL
EOSINOPHIL # BLD AUTO: 1.26 THOUSAND/ΜL (ref 0–0.61)
EOSINOPHIL NFR BLD AUTO: 14 % (ref 0–6)
ERYTHROCYTE [DISTWIDTH] IN BLOOD BY AUTOMATED COUNT: 13.5 % (ref 11.6–15.1)
GFR SERPL CREATININE-BSD FRML MDRD: 44 ML/MIN/1.73SQ M
GLUCOSE P FAST SERPL-MCNC: 115 MG/DL (ref 65–99)
GLUCOSE UR STRIP-MCNC: NEGATIVE MG/DL
HCT VFR BLD AUTO: 49.9 % (ref 36.5–49.3)
HGB BLD-MCNC: 16.3 G/DL (ref 12–17)
HGB UR QL STRIP.AUTO: NEGATIVE
HYALINE CASTS #/AREA URNS LPF: ABNORMAL /LPF
IMM GRANULOCYTES # BLD AUTO: 0.02 THOUSAND/UL (ref 0–0.2)
IMM GRANULOCYTES NFR BLD AUTO: 0 % (ref 0–2)
KETONES UR STRIP-MCNC: NEGATIVE MG/DL
LEUKOCYTE ESTERASE UR QL STRIP: NEGATIVE
LYMPHOCYTES # BLD AUTO: 1.39 THOUSANDS/ΜL (ref 0.6–4.47)
LYMPHOCYTES NFR BLD AUTO: 15 % (ref 14–44)
MCH RBC QN AUTO: 33.3 PG (ref 26.8–34.3)
MCHC RBC AUTO-ENTMCNC: 32.7 G/DL (ref 31.4–37.4)
MCV RBC AUTO: 102 FL (ref 82–98)
MICROALBUMIN UR-MCNC: 140 MG/L (ref 0–20)
MICROALBUMIN/CREAT 24H UR: 118 MG/G CREATININE (ref 0–30)
MONOCYTES # BLD AUTO: 0.8 THOUSAND/ΜL (ref 0.17–1.22)
MONOCYTES NFR BLD AUTO: 9 % (ref 4–12)
MUCOUS THREADS UR QL AUTO: ABNORMAL
NEUTROPHILS # BLD AUTO: 5.69 THOUSANDS/ΜL (ref 1.85–7.62)
NEUTS SEG NFR BLD AUTO: 61 % (ref 43–75)
NITRITE UR QL STRIP: NEGATIVE
NON-SQ EPI CELLS URNS QL MICRO: ABNORMAL /HPF
NRBC BLD AUTO-RTO: 0 /100 WBCS
OTHER STN SPEC: ABNORMAL
PH UR STRIP.AUTO: 6.5 [PH]
PHOSPHATE SERPL-MCNC: 3.3 MG/DL (ref 2.3–4.1)
PLATELET # BLD AUTO: 156 THOUSANDS/UL (ref 149–390)
PMV BLD AUTO: 12.1 FL (ref 8.9–12.7)
POTASSIUM SERPL-SCNC: 5.2 MMOL/L (ref 3.5–5.3)
PROT SERPL-MCNC: 7.7 G/DL (ref 6.4–8.4)
PROT UR STRIP-MCNC: ABNORMAL MG/DL
PSA SERPL-MCNC: 18.2 NG/ML (ref 0–4)
PTH-INTACT SERPL-MCNC: 78 PG/ML (ref 18.4–80.1)
RBC # BLD AUTO: 4.9 MILLION/UL (ref 3.88–5.62)
RBC #/AREA URNS AUTO: ABNORMAL /HPF
SODIUM SERPL-SCNC: 138 MMOL/L (ref 135–147)
SP GR UR STRIP.AUTO: 1.02 (ref 1–1.03)
UROBILINOGEN UR STRIP-ACNC: <2 MG/DL
WBC # BLD AUTO: 9.29 THOUSAND/UL (ref 4.31–10.16)
WBC #/AREA URNS AUTO: ABNORMAL /HPF

## 2023-05-09 PROBLEM — Z00.00 MEDICARE ANNUAL WELLNESS VISIT, SUBSEQUENT: Status: RESOLVED | Noted: 2022-03-09 | Resolved: 2023-05-09

## 2023-08-04 ENCOUNTER — APPOINTMENT (OUTPATIENT)
Dept: LAB | Facility: CLINIC | Age: 83
End: 2023-08-04
Payer: COMMERCIAL

## 2023-08-04 DIAGNOSIS — C61 MALIGNANT NEOPLASM OF PROSTATE (HCC): ICD-10-CM

## 2023-08-04 LAB — PSA SERPL-MCNC: 26.6 NG/ML (ref 0–4)

## 2023-08-04 PROCEDURE — G0103 PSA SCREENING: HCPCS

## 2023-08-04 PROCEDURE — 36415 COLL VENOUS BLD VENIPUNCTURE: CPT

## 2023-08-25 ENCOUNTER — HOSPITAL ENCOUNTER (OUTPATIENT)
Dept: NUCLEAR MEDICINE | Facility: HOSPITAL | Age: 83
End: 2023-08-25
Payer: COMMERCIAL

## 2023-08-25 DIAGNOSIS — C61 MALIGNANT NEOPLASM OF PROSTATE (HCC): ICD-10-CM

## 2023-08-25 DIAGNOSIS — R97.20 ELEVATED PROSTATE SPECIFIC ANTIGEN (PSA): ICD-10-CM

## 2023-08-25 PROCEDURE — 78815 PET IMAGE W/CT SKULL-THIGH: CPT

## 2023-08-25 PROCEDURE — G1004 CDSM NDSC: HCPCS

## 2023-08-25 PROCEDURE — A9588 FLUCICLOVINE F-18: HCPCS

## 2023-09-01 ENCOUNTER — RA CDI HCC (OUTPATIENT)
Dept: OTHER | Facility: HOSPITAL | Age: 83
End: 2023-09-01

## 2023-09-01 NOTE — PROGRESS NOTES
[address] [facility] coding opportunities       Chart reviewed, no opportunity found: [address]        Patients Insurance     Medicare Insurance: [employer]

## 2023-09-11 ENCOUNTER — OFFICE VISIT (OUTPATIENT)
Dept: FAMILY MEDICINE CLINIC | Facility: CLINIC | Age: 83
End: 2023-09-11
Payer: COMMERCIAL

## 2023-09-11 VITALS
SYSTOLIC BLOOD PRESSURE: 150 MMHG | WEIGHT: 187 LBS | BODY MASS INDEX: 29.35 KG/M2 | TEMPERATURE: 96.9 F | OXYGEN SATURATION: 96 % | HEART RATE: 67 BPM | DIASTOLIC BLOOD PRESSURE: 88 MMHG | HEIGHT: 67 IN

## 2023-09-11 DIAGNOSIS — N28.1 ACQUIRED RENAL CYST OF LEFT KIDNEY: ICD-10-CM

## 2023-09-11 DIAGNOSIS — Z79.01 CHRONIC ANTICOAGULATION: ICD-10-CM

## 2023-09-11 DIAGNOSIS — I26.93 SINGLE SUBSEGMENTAL PULMONARY EMBOLISM WITHOUT ACUTE COR PULMONALE (HCC): ICD-10-CM

## 2023-09-11 DIAGNOSIS — E78.5 HYPERLIPIDEMIA, UNSPECIFIED HYPERLIPIDEMIA TYPE: ICD-10-CM

## 2023-09-11 DIAGNOSIS — I10 ESSENTIAL HYPERTENSION: Primary | ICD-10-CM

## 2023-09-11 DIAGNOSIS — D17.71 ANGIOMYOLIPOMA OF LEFT KIDNEY: ICD-10-CM

## 2023-09-11 DIAGNOSIS — I82.501 CHRONIC DEEP VEIN THROMBOSIS (DVT) OF RIGHT LOWER EXTREMITY, UNSPECIFIED VEIN (HCC): ICD-10-CM

## 2023-09-11 PROBLEM — S22.32XD: Status: RESOLVED | Noted: 2022-05-11 | Resolved: 2023-09-11

## 2023-09-11 PROCEDURE — 99214 OFFICE O/P EST MOD 30 MIN: CPT | Performed by: FAMILY MEDICINE

## 2023-09-11 NOTE — PROGRESS NOTES
Name: Gary Montenegro      : 1940      MRN: [id number]  Encounter Provider: Mariam Patel DO  Encounter Date: 2023   Encounter department: FAMILY PRACTICE AT [address] [place] [address]     1. Essential hypertension    2. Hyperlipidemia, unspecified hyperlipidemia type    3. Acquired renal cyst of left kidney    4. Angiomyolipoma of left kidney    5. Chronic anticoagulation    6. Single subsegmental pulmonary embolism without acute cor pulmonale (HCC)    7. Chronic deep vein thrombosis (DVT) of right lower extremity, unspecified vein (HCC)    hyperlipidemia controlled, cpm  HTN- bp elevated today, pt states home bp's normal most of the time, asymptomatic, continue monitoring  Pt did not obtain the repeat MRI that had been ordered to be done in November last year, and now is also overdue for the 12 month f/u MRI          Subjective      Chief Complaint   Patient presents with   • Follow-up     6 month       scheduled f/u visit   doing well  Pt seeing Dr. Nicole Dukes for urological issues- no notes in record as this clinician not part of Epic - pt states sees again in December  Sees nephrol routinely-  Pt reports checks home bp - "most of the time it's good, sometimes like today 150/88"  denies CP, SOB, palp, HA, dizziness  Still have trouble with my legs- go to the vascular surgeon- see her in November or December   Pt did not obtain the repeat MRI that had been ordered to be done in November last year for large left renal cyst, and now is also overdue for the 12 month f/u MRI   He did have PET CT last month for his prostate CA- per chart review showed "CT images: Left renal cyst similar to prior study"      2022  [facility]  Assessment/Plan:   Nodule of kidney  Comments:  found on CT during hospitalization, requires further w/u  Orders:  -     CT renal protocol;  Future      May 9, 2022  All Conversations: Ct Scan  Me  to Peter Aden      22  2:13 PM  Note  Please call pt and let him know that there is a shortage of CT contrast material, and MRI was advised as other option for further eval of the abnormal US findings, so I am switching the order to MRI instead- as long as he has no indwelling metal.   The CT has been cancelled. 5/18/2022   MRI abdomen w wo contrast  IMPRESSION:  1. 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 Recommend follow-up contrast   enhanced MRI abdomen in 6 months and 12 months and subsequent annual surveillance for a total of 5 years to assess for morphologic change. REFERENCE: [facility] et al. Bosniak classification of cystic renal masses, Version 2019:   an update proposal and needs assessment. Radiology 2019; [id number]. 2.  The renal lesions noted on prior ultrasound and CT respectively correspond to a left lower pole 1.2 cm angiomyolipoma (AML) and a left upper pole 1.3 cm hemorrhagic or proteinaceous cyst.  The study was marked in EPIC for significant notification, as well as for follow-up.       6/14/2022  [facility]  Subjective:   Chief Complaint  Patient presents with  • Follow-up      Review results. No concerns today. No refills needed today.   Here to discuss MRI results- ordered to further w/u Nodule of kidney that had been found on CT during hospitalization in April for acute PE, sepsis, acute influenza A, acute resp failure and PATRICK  The renal nodule required further w/u with CT renal protocol recommended, but due to IV contrast shortage, MRI was advised instead   Was new pt here last year  Pt has been doing well in interim  States today that he sees urologist, Dr. 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 "Recommend follow-up contrast enhanced MRI abdomen in 6 months and 12 months and subsequent annual surveillance for a total of 5 years to assess for morphologic change"  Orders:  -     MRI abdomen w wo contrast; Future  Abnormal CT scan          renal lesions noted on prior ultrasound and CT respectively correspond to a left lower pole 1.2 cm angiomyolipoma (AML) and a left upper pole 1.3 cm hemorrhagic or proteinaceous cyst.  Pt states he sees urologist, Dr. Bushra Broderick twice a year- no records in chart from urology- will have our staff call for notes       April 13, 2023       4/13/23 11:59 AM  [place] attempted to contact Jaymadhavi Dolly (Left Message)    Artemasmalissa Gonzales      4/13/23 12:01 PM  Note      Lmom for pt to schedule a repeat MRI to f/u from 5/18/22 MRI. Review of Systems   Constitutional: Negative. HENT: Negative for nosebleeds. Respiratory: Negative. Cardiovascular: Negative. Gastrointestinal: Negative. Genitourinary: Negative for hematuria. Hematological: Negative.         Current Outpatient Medications on File Prior to Visit   Medication Sig   • apixaban (ELIQUIS) 5 mg Take 1 tablet (5 mg total) by mouth 2 (two) times a day   • ascorbic Acid (VITAMIN C) 500 MG CPCR Take 500 mg by mouth daily   • b complex vitamins capsule Take 1 capsule by mouth daily   • cholecalciferol (VITAMIN D3) 400 units tablet Take 800 Units by mouth daily    • diltiazem (DILACOR XR) 240 MG 24 hr capsule Take 1 capsule (240 mg total) by mouth daily   • lisinopril-hydrochlorothiazide (PRINZIDE,ZESTORETIC) 20-25 MG per tablet Take 1 tablet by mouth daily   • magnesium 30 MG tablet Take 30 mg by mouth daily   • Potassium 99 MG TABS Take 1 tablet by mouth daily     • pravastatin (PRAVACHOL) 40 mg tablet TAKE ONE TABLET BY MOUTH AT BEDTIME INSTEAD OF FLUVASTATIN FOR CHOLESTEROL   • vitamin E, tocopherol, 200 units capsule Take 200 Units by mouth daily       Objective     /88 (BP Location: Left arm, Patient Position: Sitting, Cuff Size: Standard)   Pulse 67   Temp (!) 96.9 °F (36.1 °C) (Tympanic)   Ht 5' 7" (1.702 m)   Wt 84.8 kg (187 lb)   SpO2 96%   BMI 29.29 kg/m²     Physical Exam  Vitals and nursing note reviewed. Constitutional:       General: He is not in acute distress. Appearance: He is well-developed and well-groomed. He is not ill-appearing, toxic-appearing or diaphoretic. Comments: Appears somewhat younger than stated age   HENT:      Head: Normocephalic and atraumatic. Eyes:      General: Lids are normal.      Conjunctiva/sclera: Conjunctivae normal.   Neck:      Trachea: Phonation normal.   Cardiovascular:      Rate and Rhythm: Normal rate and regular rhythm. Heart sounds: Normal heart sounds. Pulmonary:      Effort: Pulmonary effort is normal.      Breath sounds: Normal breath sounds and air entry. Abdominal:      General: Bowel sounds are normal.      Palpations: Abdomen is soft. There is no hepatomegaly, splenomegaly or mass. Tenderness: There is no abdominal tenderness. Musculoskeletal:      Right lower leg: No edema. Left lower leg: No edema. Skin:     General: Skin is warm and dry. Coloration: Skin is not pale. Neurological:      Mental Status: He is alert and oriented to person, place, and time. Gait: Gait normal.   Psychiatric:         Mood and Affect: Mood normal.         Behavior: Behavior normal. Behavior is cooperative.        Dale York DO

## 2023-11-22 ENCOUNTER — APPOINTMENT (OUTPATIENT)
Dept: LAB | Facility: CLINIC | Age: 83
End: 2023-11-22
Payer: COMMERCIAL

## 2023-11-22 DIAGNOSIS — C61 MALIGNANT NEOPLASM OF PROSTATE (HCC): ICD-10-CM

## 2023-11-22 LAB — PSA SERPL-MCNC: 36.8 NG/ML (ref 0–4)

## 2023-11-22 PROCEDURE — 36415 COLL VENOUS BLD VENIPUNCTURE: CPT

## 2023-11-22 PROCEDURE — G0103 PSA SCREENING: HCPCS

## 2024-01-26 ENCOUNTER — VBI (OUTPATIENT)
Dept: ADMINISTRATIVE | Facility: OTHER | Age: 84
End: 2024-01-26

## 2024-02-18 DIAGNOSIS — Z79.01 CHRONIC ANTICOAGULATION: ICD-10-CM

## 2024-02-18 DIAGNOSIS — E78.5 HYPERLIPIDEMIA, UNSPECIFIED HYPERLIPIDEMIA TYPE: ICD-10-CM

## 2024-02-18 DIAGNOSIS — Z13.29 THYROID DISORDER SCREEN: ICD-10-CM

## 2024-02-18 DIAGNOSIS — I10 ESSENTIAL HYPERTENSION: Primary | ICD-10-CM

## 2024-02-18 DIAGNOSIS — R73.03 PREDIABETES: ICD-10-CM

## 2024-02-20 ENCOUNTER — TRANSCRIBE ORDERS (OUTPATIENT)
Dept: LAB | Facility: CLINIC | Age: 84
End: 2024-02-20

## 2024-02-20 ENCOUNTER — APPOINTMENT (OUTPATIENT)
Dept: LAB | Facility: CLINIC | Age: 84
End: 2024-02-20
Payer: COMMERCIAL

## 2024-02-20 DIAGNOSIS — I10 ESSENTIAL HYPERTENSION: ICD-10-CM

## 2024-02-20 DIAGNOSIS — C61 MALIGNANT NEOPLASM OF PROSTATE (HCC): Primary | ICD-10-CM

## 2024-02-20 DIAGNOSIS — E78.5 HYPERLIPIDEMIA, UNSPECIFIED HYPERLIPIDEMIA TYPE: ICD-10-CM

## 2024-02-20 DIAGNOSIS — C61 MALIGNANT NEOPLASM OF PROSTATE (HCC): ICD-10-CM

## 2024-02-20 DIAGNOSIS — R73.03 PREDIABETES: ICD-10-CM

## 2024-02-20 DIAGNOSIS — Z13.29 THYROID DISORDER SCREEN: ICD-10-CM

## 2024-02-20 DIAGNOSIS — Z79.01 CHRONIC ANTICOAGULATION: ICD-10-CM

## 2024-02-20 LAB
ALBUMIN SERPL BCP-MCNC: 4.1 G/DL (ref 3.5–5)
ALP SERPL-CCNC: 46 U/L (ref 34–104)
ALT SERPL W P-5'-P-CCNC: 20 U/L (ref 7–52)
ANION GAP SERPL CALCULATED.3IONS-SCNC: 8 MMOL/L
AST SERPL W P-5'-P-CCNC: 18 U/L (ref 13–39)
BASOPHILS # BLD AUTO: 0.11 THOUSANDS/ÂΜL (ref 0–0.1)
BASOPHILS NFR BLD AUTO: 1 % (ref 0–1)
BILIRUB SERPL-MCNC: 0.6 MG/DL (ref 0.2–1)
BUN SERPL-MCNC: 27 MG/DL (ref 5–25)
CALCIUM SERPL-MCNC: 10 MG/DL (ref 8.4–10.2)
CHLORIDE SERPL-SCNC: 102 MMOL/L (ref 96–108)
CHOLEST SERPL-MCNC: 157 MG/DL
CO2 SERPL-SCNC: 31 MMOL/L (ref 21–32)
CREAT SERPL-MCNC: 1.55 MG/DL (ref 0.6–1.3)
CREAT UR-MCNC: 117 MG/DL
EOSINOPHIL # BLD AUTO: 1.01 THOUSAND/ÂΜL (ref 0–0.61)
EOSINOPHIL NFR BLD AUTO: 11 % (ref 0–6)
ERYTHROCYTE [DISTWIDTH] IN BLOOD BY AUTOMATED COUNT: 13.3 % (ref 11.6–15.1)
EST. AVERAGE GLUCOSE BLD GHB EST-MCNC: 143 MG/DL
GFR SERPL CREATININE-BSD FRML MDRD: 40 ML/MIN/1.73SQ M
GLUCOSE P FAST SERPL-MCNC: 118 MG/DL (ref 65–99)
HBA1C MFR BLD: 6.6 %
HCT VFR BLD AUTO: 46.2 % (ref 36.5–49.3)
HDLC SERPL-MCNC: 63 MG/DL
HGB BLD-MCNC: 15.2 G/DL (ref 12–17)
IMM GRANULOCYTES # BLD AUTO: 0.03 THOUSAND/UL (ref 0–0.2)
IMM GRANULOCYTES NFR BLD AUTO: 0 % (ref 0–2)
LDLC SERPL CALC-MCNC: 74 MG/DL (ref 0–100)
LYMPHOCYTES # BLD AUTO: 1.46 THOUSANDS/ÂΜL (ref 0.6–4.47)
LYMPHOCYTES NFR BLD AUTO: 16 % (ref 14–44)
MCH RBC QN AUTO: 33.2 PG (ref 26.8–34.3)
MCHC RBC AUTO-ENTMCNC: 32.9 G/DL (ref 31.4–37.4)
MCV RBC AUTO: 101 FL (ref 82–98)
MICROALBUMIN UR-MCNC: 145 MG/L
MICROALBUMIN/CREAT 24H UR: 124 MG/G CREATININE (ref 0–30)
MONOCYTES # BLD AUTO: 0.83 THOUSAND/ÂΜL (ref 0.17–1.22)
MONOCYTES NFR BLD AUTO: 9 % (ref 4–12)
NEUTROPHILS # BLD AUTO: 5.55 THOUSANDS/ÂΜL (ref 1.85–7.62)
NEUTS SEG NFR BLD AUTO: 63 % (ref 43–75)
NONHDLC SERPL-MCNC: 94 MG/DL
NRBC BLD AUTO-RTO: 0 /100 WBCS
PLATELET # BLD AUTO: 121 THOUSANDS/UL (ref 149–390)
PMV BLD AUTO: 12.1 FL (ref 8.9–12.7)
POTASSIUM SERPL-SCNC: 3.9 MMOL/L (ref 3.5–5.3)
PROT SERPL-MCNC: 6.8 G/DL (ref 6.4–8.4)
PSA SERPL-MCNC: 38.1 NG/ML (ref 0–4)
RBC # BLD AUTO: 4.58 MILLION/UL (ref 3.88–5.62)
SODIUM SERPL-SCNC: 141 MMOL/L (ref 135–147)
TRIGL SERPL-MCNC: 102 MG/DL
TSH SERPL DL<=0.05 MIU/L-ACNC: 2.81 UIU/ML (ref 0.45–4.5)
WBC # BLD AUTO: 8.99 THOUSAND/UL (ref 4.31–10.16)

## 2024-02-20 PROCEDURE — 84443 ASSAY THYROID STIM HORMONE: CPT

## 2024-02-20 PROCEDURE — G0103 PSA SCREENING: HCPCS

## 2024-02-20 PROCEDURE — 80061 LIPID PANEL: CPT

## 2024-02-20 PROCEDURE — 82043 UR ALBUMIN QUANTITATIVE: CPT

## 2024-02-20 PROCEDURE — 36415 COLL VENOUS BLD VENIPUNCTURE: CPT

## 2024-02-20 PROCEDURE — 82570 ASSAY OF URINE CREATININE: CPT

## 2024-02-20 PROCEDURE — 83036 HEMOGLOBIN GLYCOSYLATED A1C: CPT

## 2024-02-20 PROCEDURE — 80053 COMPREHEN METABOLIC PANEL: CPT

## 2024-02-20 PROCEDURE — 85025 COMPLETE CBC W/AUTO DIFF WBC: CPT

## 2024-03-12 ENCOUNTER — OFFICE VISIT (OUTPATIENT)
Dept: FAMILY MEDICINE CLINIC | Facility: CLINIC | Age: 84
End: 2024-03-12
Payer: COMMERCIAL

## 2024-03-12 VITALS
HEART RATE: 57 BPM | BODY MASS INDEX: 30.54 KG/M2 | SYSTOLIC BLOOD PRESSURE: 140 MMHG | OXYGEN SATURATION: 96 % | HEIGHT: 67 IN | DIASTOLIC BLOOD PRESSURE: 60 MMHG | TEMPERATURE: 97.3 F | WEIGHT: 194.6 LBS

## 2024-03-12 DIAGNOSIS — E78.5 HYPERLIPIDEMIA, UNSPECIFIED HYPERLIPIDEMIA TYPE: ICD-10-CM

## 2024-03-12 DIAGNOSIS — N18.31 STAGE 3A CHRONIC KIDNEY DISEASE (HCC): ICD-10-CM

## 2024-03-12 DIAGNOSIS — I26.93 SINGLE SUBSEGMENTAL PULMONARY EMBOLISM WITHOUT ACUTE COR PULMONALE (HCC): ICD-10-CM

## 2024-03-12 DIAGNOSIS — C61 MALIGNANT NEOPLASM OF PROSTATE (HCC): ICD-10-CM

## 2024-03-12 DIAGNOSIS — Z00.00 MEDICARE ANNUAL WELLNESS VISIT, SUBSEQUENT: Primary | ICD-10-CM

## 2024-03-12 DIAGNOSIS — D69.6 PLATELETS DECREASED (HCC): ICD-10-CM

## 2024-03-12 DIAGNOSIS — I10 ESSENTIAL HYPERTENSION: ICD-10-CM

## 2024-03-12 DIAGNOSIS — I82.501 CHRONIC DEEP VEIN THROMBOSIS (DVT) OF RIGHT LOWER EXTREMITY, UNSPECIFIED VEIN (HCC): ICD-10-CM

## 2024-03-12 DIAGNOSIS — Z79.01 CHRONIC ANTICOAGULATION: ICD-10-CM

## 2024-03-12 DIAGNOSIS — R73.03 PREDIABETES: ICD-10-CM

## 2024-03-12 PROCEDURE — G0439 PPPS, SUBSEQ VISIT: HCPCS | Performed by: FAMILY MEDICINE

## 2024-03-12 PROCEDURE — 99214 OFFICE O/P EST MOD 30 MIN: CPT | Performed by: FAMILY MEDICINE

## 2024-03-12 RX ORDER — AMOXICILLIN 500 MG
CAPSULE ORAL
COMMUNITY

## 2024-03-12 NOTE — PROGRESS NOTES
Assessment and Plan:   Daryn was seen today for medicare wellness visit and follow-up.    Diagnoses and all orders for this visit:    Medicare annual wellness visit, subsequent    Essential hypertension    Hyperlipidemia, unspecified hyperlipidemia type    Chronic deep vein thrombosis (DVT) of right lower extremity, unspecified vein (HCC)    Chronic anticoagulation    Single subsegmental pulmonary embolism without acute cor pulmonale (HCC)    Prediabetes    Platelets decreased (HCC)    Stage 3a chronic kidney disease (HCC)  Comments:  managed by [facility] nephrologist    Malignant neoplasm of prostate (HCC)  Comments:  managed by urologist, Dr. Delgado    Chronic conditions stable, [facility]    Problem List Items Addressed This Visit          Cardiovascular and Mediastinum    Pulmonary embolus (HCC)    Essential hypertension    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)       Genitourinary    Stage 3a chronic kidney disease (HCC)    Malignant neoplasm of prostate (HCC)       Blood    Platelets decreased (HCC)       Other    Prediabetes    Medicare annual wellness visit, subsequent - Primary    Hyperlipidemia    Chronic anticoagulation        Preventive health issues were discussed with patient, and age appropriate screening tests were ordered as noted in patient's After Visit Summary.  Personalized health advice and appropriate referrals for health education or preventive services given if needed, as noted in patient's After Visit Summary.     History of Present Illness:     Chief Complaint   Patient presents with    Medicare Wellness Visit    Follow-up       Patient presents for a Medicare Wellness Visit + f/u    Medicare AWV + f/u visit  Doing well, no interval acute problems       Patient Care Team:  Ashlee Fleming DO as PCP - General (Family Medicine)  Ju Iqbal MD (Vascular Surgery)  Gabriel Amaya DO (Nephrology)     Review of Systems:     Review of Systems   Constitutional:  Negative.    Respiratory: Negative.     Cardiovascular: Negative.    Gastrointestinal: Negative.    Neurological:  Positive for tremors (unchanged). Negative for dizziness, seizures, syncope, facial asymmetry, speech difficulty, weakness, light-headedness, numbness and headaches.   Hematological: Negative.         Denies any night sweats        Problem List:     Patient Active Problem List   Diagnosis    Trochanteric bursitis of left hip    Essential hypertension    Hyperlipidemia    History of deep venous thrombosis (DVT) of distal vein of right lower extremity    Prediabetes    Tremor of both hands    Medicare annual wellness visit, subsequent    Atherosclerosis of native arteries of extremities with intermittent claudication, bilateral legs (HCC)    Pulmonary embolus (HCC)    Abnormal CT scan    History of rib fracture    Nodule of kidney    History of acute respiratory failure    History of sepsis    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)    Former smoker    Chronic anticoagulation    History of kidney injury    Stage 3a chronic kidney disease (HCC)    Acquired renal cyst of left kidney    Angiomyolipoma of left kidney    Personal history of COVID-19    Peripheral arterial disease (HCC)    Platelets decreased (HCC)    Malignant neoplasm of prostate (HCC)      Past Medical and Surgical History:     Past Medical History:   Diagnosis Date    Acute kidney injury (HCC) 4/18/2022    PATRICK (acute kidney injury) (HCC) 4/18/2022    BMI 28.0-28.9,adult 2/4/2021    BMI 30.0-30.9,adult 3/15/2023    Fracture of rib of left side with routine healing 5/11/2022    Hx of blood clots     Hyperlipidemia     Hypertension     Influenza A 4/18/2022    Sepsis (HCC) 4/18/2022     Past Surgical History:   Procedure Laterality Date    HERNIA REPAIR      IR ABDOMINAL AORTAGRAM  8/3/2022    KNEE ARTHROSCOPY Right 07/31/2015    PROSTATE SURGERY        Family History:     Family History   Problem Relation Age of  Onset    Lung cancer Mother     Ulcers Father     No Known Problems Sister     Heart attack Brother     Alcohol abuse Brother     Kidney disease Brother       Social History:     Social History     Socioeconomic History    Marital status: /Civil Union     Spouse name: None    Number of children: None    Years of education: None    Highest education level: None   Occupational History    None   Tobacco Use    Smoking status: Former     Current packs/day: 0.00     Types: Cigarettes     Quit date:      Years since quittin.2    Smokeless tobacco: Never    Tobacco comments:     2+ppd x 20 years    Vaping Use    Vaping status: Never Used   Substance and Sexual Activity    Alcohol use: Not Currently    Drug use: Not Currently    Sexual activity: Not Currently   Other Topics Concern    None   Social History Narrative    Retired, lives with his wife, boubacar and great-grandson    [employer] [phone]     Social Determinants of Health     Financial Resource Strain: Low Risk  (3/10/2023)    Overall Financial Resource Strain (CARDIA)     Difficulty of Paying Living Expenses: Not very hard   Food Insecurity: No Food Insecurity (3/12/2024)    Hunger Vital Sign     Worried About Running Out of Food in the Last Year: Never true     Ran Out of Food in the Last Year: Never true   Transportation Needs: No Transportation Needs (3/12/2024)    PRAPARE - Transportation     Lack of Transportation (Medical): No     Lack of Transportation (Non-Medical): No   Physical Activity: Not on file   Stress: Not on file   Social Connections: Not on file   Intimate Partner Violence: Not on file   Housing Stability: Unknown (3/12/2024)    Housing Stability Vital Sign     Unable to Pay for Housing in the Last Year: No     Number of Places Lived in the Last Year: Not on file     Unstable Housing in the Last Year: No      Medications and Allergies:     Current Outpatient Medications   Medication Sig Dispense Refill     apixaban (ELIQUIS) 5 mg Take 1 tablet (5 mg total) by mouth 2 (two) times a day 60 tablet 0    ascorbic Acid (VITAMIN C) 500 MG CPCR Take 500 mg by mouth daily      b complex vitamins capsule Take 1 capsule by mouth daily      cholecalciferol (VITAMIN D3) 400 units tablet Take 800 Units by mouth daily       diltiazem (DILACOR XR) 240 MG 24 hr capsule Take 1 capsule (240 mg total) by mouth daily 90 capsule 1    lisinopril-hydrochlorothiazide (PRINZIDE,ZESTORETIC) 20-25 MG per tablet Take 1 tablet by mouth daily      magnesium 30 MG tablet Take 30 mg by mouth daily      Omega-3 Fatty Acids (Fish Oil) 1200 MG CAPS Take by mouth      patient supplied medication RED BEET EXTRACT      Potassium 99 MG TABS Take 1 tablet by mouth daily        pravastatin (PRAVACHOL) 40 mg tablet TAKE ONE TABLET BY MOUTH AT BEDTIME INSTEAD OF FLUVASTATIN FOR CHOLESTEROL      vitamin E, tocopherol, 200 units capsule Take 200 Units by mouth daily       No current facility-administered medications for this visit.     No Known Allergies   Immunizations:     Immunization History   Administered Date(s) Administered    COVID-19 MODERNA VACC 0.5 ML IM 02/09/2021, 03/09/2021, 10/26/2021, 04/11/2022    COVID-19 Moderna mRNA Vaccine 12 Yr+ 50 mcg/0.5 mL (Spikevax) 10/23/2023    H1N1, All Formulations 04/29/2010    INFLUENZA 10/16/2000, 10/24/2001, 10/24/2002, 01/01/2003, 01/13/2003, 11/17/2004, 11/01/2005, 11/08/2006, 10/29/2007, 10/04/2008, 09/15/2009, 10/02/2010, 10/03/2011, 10/17/2012, 11/13/2013, 09/30/2014, 10/30/2018, 09/27/2019, 09/24/2020, 02/07/2023, 09/18/2023    Influenza Quadrivalent Preservative Free 3 years and older IM 10/30/2018, 09/27/2019, 09/24/2020    Influenza, Seasonal Vaccine, Quadrivalent, Adjuvanted, .5e 09/23/2021, 10/17/2022    Influenza, seasonal, injectable, preservative free 09/22/2015, 03/07/2017, 10/03/2017    Pneumococcal 01/01/2004, 11/30/2004    Pneumococcal Conjugate 13-Valent 02/09/2016    Pneumococcal Conjugate  Vaccine 20-valent (Pcv20), Polysace 05/02/2022    Tdap 03/10/2014    Zoster 04/03/2013    Zoster Vaccine Recombinant 07/29/2022, 10/17/2022      Health Maintenance:     There are no preventive care reminders to display for this patient.      Topic Date Due    COVID-19 Vaccine (6 - 2023-24 season) 12/18/2023      Medicare Screening Tests and Risk Assessments:     Daryn is here for his Subsequent Wellness visit. Last Medicare Wellness visit information reviewed, patient interviewed and updates made to the record today.      Health Risk Assessment:   Patient rates overall health as fair. Patient feels that their physical health rating is same. Patient is satisfied with their life. Eyesight was rated as same. Hearing was rated as same. Patient feels that their emotional and mental health rating is same. Patients states they are never, rarely angry. Patient states they are sometimes unusually tired/fatigued. Pain experienced in the last 7 days has been none. Patient states that he has experienced no weight loss or gain in last 6 months.     Depression Screening:   PHQ-2 Score: 0      Fall Risk Screening:   In the past year, patient has experienced: no history of falling in past year      Home Safety:  Patient does not have trouble with stairs inside or outside of their home. Patient has working smoke alarms and has working carbon monoxide detector. Home safety hazards include: none.     Nutrition:   Current diet is Regular.     Medications:   Patient is currently taking over-the-counter supplements. OTC medications include: see medication list. Patient is able to manage medications.     Activities of Daily Living (ADLs)/Instrumental Activities of Daily Living (IADLs):   Walk and transfer into and out of bed and chair?: Yes  Dress and groom yourself?: Yes    Bathe or shower yourself?: Yes    Feed yourself? Yes  Do your laundry/housekeeping?: Yes  Manage your money, pay your bills and track your expenses?: Yes  Make your  "own meals?: Yes    Do your own shopping?: Yes    Previous Hospitalizations:   Any hospitalizations or ED visits within the last 12 months?: No      PREVENTIVE SCREENINGS      Cardiovascular Screening:    General: Screening Not Indicated and History Lipid Disorder      Diabetes Screening:     General: Screening Current      Colorectal Cancer Screening:     General: Screening Not Indicated      Prostate Cancer Screening:    General: History Prostate Cancer and Screening Not Indicated      Abdominal Aortic Aneurysm (AAA) Screening:    Risk factors include: tobacco use        Lung Cancer Screening:     General: Screening Not Indicated    Screening, Brief Intervention, and Referral to Treatment (SBIRT)    Screening  Typical number of drinks in a day: 0  Typical number of drinks in a week: 0  Interpretation: Low risk drinking behavior.    Single Item Drug Screening:  How often have you used an illegal drug (including marijuana) or a prescription medication for non-medical reasons in the past year? never    Single Item Drug Screen Score: 0  Interpretation: Negative screen for possible drug use disorder    No results found.     Physical Exam:     /60 (BP Location: Left arm, Patient Position: Sitting, Cuff Size: Standard)   Pulse 57   Temp (!) 97.3 °F (36.3 °C) (Tympanic)   Ht 5' 7\" (1.702 m)   Wt 88.3 kg (194 lb 9.6 oz)   SpO2 96%   BMI 30.48 kg/m²     Physical Exam  Vitals and nursing note reviewed.   Constitutional:       General: He is not in acute distress.     Appearance: He is well-developed and well-groomed. He is not ill-appearing, toxic-appearing or diaphoretic.   HENT:      Head: Normocephalic and atraumatic.      Right Ear: Tympanic membrane, ear canal and external ear normal.      Left Ear: Tympanic membrane, ear canal and external ear normal.      Mouth/Throat:      Lips: Pink.      Mouth: Mucous membranes are moist.      Pharynx: Oropharynx is clear. Uvula midline.   Eyes:      General: Lids are " normal.      Conjunctiva/sclera: Conjunctivae normal.      Pupils: Pupils are equal, round, and reactive to light.   Neck:      Thyroid: No thyroid mass, thyromegaly or thyroid tenderness.      Vascular: No JVD.      Trachea: Trachea and phonation normal.   Cardiovascular:      Rate and Rhythm: Normal rate.      Pulses: Normal pulses.      Heart sounds: S1 normal and S2 normal.      No friction rub. No gallop.   Pulmonary:      Effort: Pulmonary effort is normal.      Breath sounds: Normal breath sounds and air entry.   Abdominal:      General: Bowel sounds are normal.      Palpations: Abdomen is soft. There is no hepatomegaly, splenomegaly or mass.      Tenderness: There is no abdominal tenderness.   Musculoskeletal:      Cervical back: Neck supple.      Right lower leg: No edema.      Left lower leg: No edema.   Lymphadenopathy:      Cervical: No cervical adenopathy.   Neurological:      Mental Status: He is alert and oriented to person, place, and time.      Gait: Gait normal.   Psychiatric:         Mood and Affect: Mood normal.         Behavior: Behavior normal. Behavior is cooperative.         Cognition and Memory: Cognition normal.         Judgment: Judgment normal.          Ashlee Fleming DO

## 2024-03-12 NOTE — PATIENT INSTRUCTIONS
[facility]    Medicare Preventive Visit Patient Instructions  Thank you for completing your Welcome to Medicare Visit or Medicare Annual Wellness Visit today. Your next wellness visit will be due in one year (3/13/2025).  The screening/preventive services that you may require over the next 5-10 years are detailed below. Some tests may not apply to you based off risk factors and/or age. Screening tests ordered at today's visit but not completed yet may show as past due. Also, please note that scanned in results may not display below.  Preventive Screenings:  Service Recommendations Previous Testing/Comments   Colorectal Cancer Screening  Colonoscopy    Fecal Occult Blood Test (FOBT)/Fecal Immunochemical Test (FIT)  Fecal DNA/Cologuard Test  Flexible Sigmoidoscopy Age: 45-75 years old   Colonoscopy: every 10 years (May be performed more frequently if at higher risk)  OR  FOBT/FIT: every 1 year  OR  Cologuard: every 3 years  OR  Sigmoidoscopy: every 5 years  Screening may be recommended earlier than age 45 if at higher risk for colorectal cancer. Also, an individualized decision between you and your healthcare provider will decide whether screening between the ages of 76-85 would be appropriate. Colonoscopy: Not on file  FOBT/FIT: Not on file  Cologuard: Not on file  Sigmoidoscopy: Not on file    Screening Not Indicated     Prostate Cancer Screening Individualized decision between patient and health care provider in men between ages of 55-69   Medicare will cover every 12 months beginning on the day after your 50th birthday PSA: 38.10 ng/mL     History Prostate Cancer  Screening Not Indicated     Hepatitis C Screening Once for adults born between 1945 and 1965  More frequently in patients at high risk for Hepatitis C Hep C Antibody: Not on file        Diabetes Screening 1-2 times per year if you're at risk for diabetes or have pre-diabetes Fasting glucose: 118 mg/dL  (2/20/2024)  A1C: 6.6 % (2/20/2024)  Screening Current   Cholesterol Screening Once every 5 years if you don't have a lipid disorder. May order more often based on risk factors. Lipid panel: 02/20/2024  Screening Not Indicated  History Lipid Disorder      Other Preventive Screenings Covered by Medicare:  Abdominal Aortic Aneurysm (AAA) Screening: covered once if your at risk. You're considered to be at risk if you have a family history of AAA or a male between the age of 65-75 who smoking at least 100 cigarettes in your lifetime.  Lung Cancer Screening: covers low dose CT scan once per year if you meet all of the following conditions: (1) Age 55-77; (2) No signs or symptoms of lung cancer; (3) Current smoker or have quit smoking within the last 15 years; (4) You have a tobacco smoking history of at least 20 pack years (packs per day x number of years you smoked); (5) You get a written order from a healthcare provider.  Glaucoma Screening: covered annually if you're considered high risk: (1) You have diabetes OR (2) Family history of glaucoma OR (3)  aged 50 and older OR (4)  American aged 65 and older  Osteoporosis Screening: covered every 2 years if you meet one of the following conditions: (1) Have a vertebral abnormality; (2) On glucocorticoid therapy for more than 3 months; (3) Have primary hyperparathyroidism; (4) On osteoporosis medications and need to assess response to drug therapy.  HIV Screening: covered annually if you're between the age of 15-65. Also covered annually if you are younger than 15 and older than 65 with risk factors for HIV infection. For pregnant patients, it is covered up to 3 times per pregnancy.    Immunizations:  Immunization Recommendations   Influenza Vaccine Annual influenza vaccination during flu season is recommended for all persons aged >= 6 months who do not have contraindications   Pneumococcal Vaccine   * Pneumococcal conjugate vaccine = PCV13 (Prevnar  13), PCV15 (Vaxneuvance), PCV20 (Prevnar 20)  * Pneumococcal polysaccharide vaccine = PPSV23 (Pneumovax) Adults 19-65 yo with certain risk factors or if 65+ yo  If never received any pneumonia vaccine: recommend Prevnar 20 (PCV20)  Give PCV20 if previously received 1 dose of PCV13 or PPSV23   Hepatitis B Vaccine 3 dose series if at intermediate or high risk (ex: diabetes, end stage renal disease, liver disease)   Respiratory syncytial virus (RSV) Vaccine - COVERED BY MEDICARE PART D  * RSVPreF3 (Arexvy) CDC recommends that adults 60 years of age and older may receive a single dose of RSV vaccine using shared clinical decision-making (SCDM)   Tetanus (Td) Vaccine - COST NOT COVERED BY MEDICARE PART B Following completion of primary series, a booster dose should be given every 10 years to maintain immunity against tetanus. Td may also be given as tetanus wound prophylaxis.   Tdap Vaccine - COST NOT COVERED BY MEDICARE PART B Recommended at least once for all adults. For pregnant patients, recommended with each pregnancy.   Shingles Vaccine (Shingrix) - COST NOT COVERED BY MEDICARE PART B  2 shot series recommended in those 19 years and older who have or will have weakened immune systems or those 50 years and older     Health Maintenance Due:  There are no preventive care reminders to display for this patient.  Immunizations Due:      Topic Date Due    COVID-19 Vaccine (6 - 2023-24 season) 12/18/2023     Advance Directives   What are advance directives?  Advance directives are legal documents that state your wishes and plans for medical care. These plans are made ahead of time in case you lose your ability to make decisions for yourself. Advance directives can apply to any medical decision, such as the treatments you want, and if you want to donate organs.   What are the types of advance directives?  There are many types of advance directives, and each state has rules about how to use them. You may choose a combination  of any of the following:  Living will:  This is a written record of the treatment you want. You can also choose which treatments you do not want, which to limit, and which to stop at a certain time. This includes surgery, medicine, IV fluid, and tube feedings.   Durable power of  for healthcare (DPAHC):  This is a written record that states who you want to make healthcare choices for you when you are unable to make them for yourself. This person, called a proxy, is usually a family member or a friend. You may choose more than 1 proxy.  Do not resuscitate (DNR) order:  A DNR order is used in case your heart stops beating or you stop breathing. It is a request not to have certain forms of treatment, such as CPR. A DNR order may be included in other types of advance directives.  Medical directive:  This covers the care that you want if you are in a coma, near death, or unable to make decisions for yourself. You can list the treatments you want for each condition. Treatment may include pain medicine, surgery, blood transfusions, dialysis, IV or tube feedings, and a ventilator (breathing machine).  Values history:  This document has questions about your views, beliefs, and how you feel and think about life. This information can help others choose the care that you would choose.  Why are advance directives important?  An advance directive helps you control your care. Although spoken wishes may be used, it is better to have your wishes written down. Spoken wishes can be misunderstood, or not followed. Treatments may be given even if you do not want them. An advance directive may make it easier for your family to make difficult choices about your care.   Weight Management   Why it is important to manage your weight:  Being overweight increases your risk of health conditions such as heart disease, high blood pressure, type 2 diabetes, and certain types of cancer. It can also increase your risk for osteoarthritis,  sleep apnea, and other respiratory problems. Aim for a slow, steady weight loss. Even a small amount of weight loss can lower your risk of health problems.  How to lose weight safely:  A safe and healthy way to lose weight is to eat fewer calories and get regular exercise. You can lose up about 1 pound a week by decreasing the number of calories you eat by 500 calories each day.   Healthy meal plan for weight management:  A healthy meal plan includes a variety of foods, contains fewer calories, and helps you stay healthy. A healthy meal plan includes the following:  Eat whole-grain foods more often.  A healthy meal plan should contain fiber. Fiber is the part of grains, fruits, and vegetables that is not broken down by your body. Whole-grain foods are healthy and provide extra fiber in your diet. Some examples of whole-grain foods are whole-wheat breads and pastas, oatmeal, brown rice, and bulgur.  Eat a variety of vegetables every day.  Include dark, leafy greens such as spinach, kale, mark greens, and mustard greens. Eat yellow and orange vegetables such as carrots, sweet potatoes, and winter squash.   Eat a variety of fruits every day.  Choose fresh or canned fruit (canned in its own juice or light syrup) instead of juice. Fruit juice has very little or no fiber.  Eat low-fat dairy foods.  Drink fat-free (skim) milk or 1% milk. Eat fat-free yogurt and low-fat cottage cheese. Try low-fat cheeses such as mozzarella and other reduced-fat cheeses.  Choose meat and other protein foods that are low in fat.  Choose beans or other legumes such as split peas or lentils. Choose fish, skinless poultry (chicken or turkey), or lean cuts of red meat (beef or pork). Before you cook meat or poultry, cut off any visible fat.   Use less fat and oil.  Try baking foods instead of frying them. Add less fat, such as margarine, sour cream, regular salad dressing and mayonnaise to foods. Eat fewer high-fat foods. Some examples of  high-fat foods include french fries, doughnuts, ice cream, and cakes.  Eat fewer sweets.  Limit foods and drinks that are high in sugar. This includes candy, cookies, regular soda, and sweetened drinks.  Exercise:  Exercise at least 30 minutes per day on most days of the week. Some examples of exercise include walking, biking, dancing, and swimming. You can also fit in more physical activity by taking the stairs instead of the elevator or parking farther away from stores. Ask your healthcare provider about the best exercise plan for you.      © Copyright [employer] 2018 Information is for End User's use only and may not be sold, redistributed or otherwise used for commercial purposes. All illustrations and images included in CareNotes® are the copyrighted property of [employer].A[employer]M., Inc. or [employer]

## 2024-03-26 ENCOUNTER — OFFICE VISIT (OUTPATIENT)
Dept: NEPHROLOGY | Facility: CLINIC | Age: 84
End: 2024-03-26
Payer: COMMERCIAL

## 2024-03-26 VITALS
BODY MASS INDEX: 30.61 KG/M2 | WEIGHT: 195 LBS | HEIGHT: 67 IN | SYSTOLIC BLOOD PRESSURE: 140 MMHG | OXYGEN SATURATION: 96 % | DIASTOLIC BLOOD PRESSURE: 74 MMHG | HEART RATE: 60 BPM

## 2024-03-26 DIAGNOSIS — N18.31 STAGE 3A CHRONIC KIDNEY DISEASE (HCC): Primary | ICD-10-CM

## 2024-03-26 DIAGNOSIS — R60.0 BILATERAL LOWER EXTREMITY EDEMA: ICD-10-CM

## 2024-03-26 DIAGNOSIS — R06.2 WHEEZING: ICD-10-CM

## 2024-03-26 DIAGNOSIS — I10 ESSENTIAL HYPERTENSION: ICD-10-CM

## 2024-03-26 PROCEDURE — G2211 COMPLEX E/M VISIT ADD ON: HCPCS | Performed by: INTERNAL MEDICINE

## 2024-03-26 PROCEDURE — 99214 OFFICE O/P EST MOD 30 MIN: CPT | Performed by: INTERNAL MEDICINE

## 2024-03-26 NOTE — ASSESSMENT & PLAN NOTE
Patient has trace bilateral lower extremity edema, continue to encourage low-sodium diet.  We deferred additional diuretic at this time and preference for the patient's lifestyle modification.

## 2024-03-26 NOTE — ASSESSMENT & PLAN NOTE
Lab Results   Component Value Date    EGFR 40 02/20/2024    EGFR 44 04/26/2023    EGFR 41 03/13/2023    CREATININE 1.55 (H) 02/20/2024    CREATININE 1.43 (H) 04/26/2023    CREATININE 1.51 (H) 03/13/2023     Patient's kidney function is about stable, slightly higher creatinine than typical between 1.4-1.5 mg/dL.

## 2024-03-26 NOTE — PROGRESS NOTES
[facility] [place]  Gabriel Amaya DO    Name: Daryn Goss  YOB: 1940      Assessment/Plan:    Stage 3a chronic kidney disease (HCC)  Lab Results   Component Value Date    EGFR 40 02/20/2024    EGFR 44 04/26/2023    EGFR 41 03/13/2023    CREATININE 1.55 (H) 02/20/2024    CREATININE 1.43 (H) 04/26/2023    CREATININE 1.51 (H) 03/13/2023     Patient's kidney function is about stable, slightly higher creatinine than typical between 1.4-1.5 mg/dL.    Essential hypertension  Blood pressure is well-controlled this time, continue current medications.    Wheezing  Patient is experiencing mild volume overloaded state versus asthma from allergens.  I am concerned of the increased volume due to mild edema bilaterally associated with significant increase in sodium in foods around the Easter holiday season.  I strongly recommended for the patient to eliminate all sodium intake as much as possible for the next 2 or 3 days.  He declined having a chest x-ray to further evaluate volume and pulmonary status in general.    Patient deferred more aggressive diuresis with a loop diuretic at this time.  In truth, if we will do more aggressive diuresis it would be short term, in order to get ahead of the volume status at this time which appears to be mildly volume overloaded.    Bilateral lower extremity edema  Patient has trace bilateral lower extremity edema, continue to encourage low-sodium diet.  We deferred additional diuretic at this time and preference for the patient's lifestyle modification.         Problem List Items Addressed This Visit          Cardiovascular and Mediastinum    Essential hypertension     Blood pressure is well-controlled this time, continue current medications.            Genitourinary    Stage 3a chronic kidney disease (HCC) - Primary     Lab Results   Component Value Date    EGFR 40 02/20/2024    EGFR 44 04/26/2023    EGFR 41 03/13/2023    CREATININE  1.55 (H) 02/20/2024    CREATININE 1.43 (H) 04/26/2023    CREATININE 1.51 (H) 03/13/2023     Patient's kidney function is about stable, slightly higher creatinine than typical between 1.4-1.5 mg/dL.            Surgery/Wound/Pain    Bilateral lower extremity edema     Patient has trace bilateral lower extremity edema, continue to encourage low-sodium diet.  We deferred additional diuretic at this time and preference for the patient's lifestyle modification.            Other    Wheezing     Patient is experiencing mild volume overloaded state versus asthma from allergens.  I am concerned of the increased volume due to mild edema bilaterally associated with significant increase in sodium in foods around the Easter holiday season.  I strongly recommended for the patient to eliminate all sodium intake as much as possible for the next 2 or 3 days.  He declined having a chest x-ray to further evaluate volume and pulmonary status in general.    Patient deferred more aggressive diuresis with a loop diuretic at this time.  In truth, if we will do more aggressive diuresis it would be short term, in order to get ahead of the volume status at this time which appears to be mildly volume overloaded.            Patient is stable for the renal standpoint.  I am concerned of potential volume overload state.  Patient deferred more aggressive measures including loop diuretic use as well as checking a chest x-ray.  We will see him back for regular appointment in 1 year, sooner if clinically indicated.    Subjective:      Patient ID: Daryn Goss is a 83 y.o. male.    Patient presents for follow up.    We reviewed labs in detail, most recent creatinine noted to be 1.55 mg/dL, estimated GFR is 40 mL/min.    There were no significant electrolyte abnormalities noted.  Patient is taking all medications as prescribed with no specific side effects and denies use of nonsteroid anti-inflammatory medications.          Hypertension  This is a  chronic problem. The current episode started more than 1 year ago. The problem is unchanged. The problem is controlled. Associated symptoms include peripheral edema. Pertinent negatives include no chest pain, orthopnea or shortness of breath. There are no associated agents to hypertension. Risk factors for coronary artery disease include male gender and sedentary lifestyle. Past treatments include lifestyle changes, diuretics and ACE inhibitors. Compliance problems include diet.  Hypertensive end-organ damage includes kidney disease. Identifiable causes of hypertension include chronic renal disease.       The following portions of the patient's history were reviewed and updated as appropriate: allergies, current medications, past family history, past medical history, past social history, past surgical history and problem list.    Review of Systems   Respiratory:  Negative for shortness of breath.    Cardiovascular:  Negative for chest pain and orthopnea.   All other systems reviewed and are negative.        Social History     Socioeconomic History    Marital status: /Civil Union     Spouse name: None    Number of children: None    Years of education: None    Highest education level: None   Occupational History    None   Tobacco Use    Smoking status: Former     Current packs/day: 0.00     Types: Cigarettes     Quit date:      Years since quittin.2    Smokeless tobacco: Never    Tobacco comments:     2+ppd x 20 years    Vaping Use    Vaping status: Never Used   Substance and Sexual Activity    Alcohol use: Not Currently    Drug use: Not Currently    Sexual activity: Not Currently   Other Topics Concern    None   Social History Narrative    Retired, lives with his wife, boubacar and great-grandson    [employer] service [place] [id number]     Social Determinants of Health     Financial Resource Strain: Low Risk  (3/10/2023)    Overall Financial Resource Strain (CARDIA)     Difficulty of Paying Living  Expenses: Not very hard   Food Insecurity: No Food Insecurity (3/12/2024)    Hunger Vital Sign     Worried About Running Out of Food in the Last Year: Never true     Ran Out of Food in the Last Year: Never true   Transportation Needs: No Transportation Needs (3/12/2024)    PRAPARE - Transportation     Lack of Transportation (Medical): No     Lack of Transportation (Non-Medical): No   Physical Activity: Not on file   Stress: Not on file   Social Connections: Not on file   Intimate Partner Violence: Not on file   Housing Stability: Unknown (3/12/2024)    Housing Stability Vital Sign     Unable to Pay for Housing in the Last Year: No     Number of Places Lived in the Last Year: Not on file     Unstable Housing in the Last Year: No     Past Medical History:   Diagnosis Date    Acute kidney injury (HCC) 4/18/2022    PATRICK (acute kidney injury) (HCC) 4/18/2022    BMI 28.0-28.9,adult 2/4/2021    BMI 30.0-30.9,adult 3/15/2023    Fracture of rib of left side with routine healing 5/11/2022    Hx of blood clots     Hyperlipidemia     Hypertension     Influenza A 4/18/2022    Sepsis (HCC) 4/18/2022     Past Surgical History:   Procedure Laterality Date    HERNIA REPAIR      IR ABDOMINAL AORTAGRAM  8/3/2022    KNEE ARTHROSCOPY Right 07/31/2015    PROSTATE SURGERY         Current Outpatient Medications:     apixaban (ELIQUIS) 5 mg, Take 1 tablet (5 mg total) by mouth 2 (two) times a day, Disp: 60 tablet, Rfl: 0    ascorbic Acid (VITAMIN C) 500 MG CPCR, Take 500 mg by mouth daily, Disp: , Rfl:     ASPIRIN 81 PO, Take by mouth, Disp: , Rfl:     b complex vitamins capsule, Take 1 capsule by mouth daily, Disp: , Rfl:     cholecalciferol (VITAMIN D3) 400 units tablet, Take 800 Units by mouth daily , Disp: , Rfl:     diltiazem (DILACOR XR) 240 MG 24 hr capsule, Take 1 capsule (240 mg total) by mouth daily, Disp: 90 capsule, Rfl: 1    lisinopril-hydrochlorothiazide (PRINZIDE,ZESTORETIC) 20-25 MG per tablet, Take 1 tablet by mouth daily,  "Disp: , Rfl:     magnesium 30 MG tablet, Take 30 mg by mouth daily, Disp: , Rfl:     Omega-3 Fatty Acids (Fish Oil) 1200 MG CAPS, Take by mouth, Disp: , Rfl:     patient supplied medication, RED BEET EXTRACT, Disp: , Rfl:     Potassium 99 MG TABS, Take 1 tablet by mouth daily  , Disp: , Rfl:     pravastatin (PRAVACHOL) 40 mg tablet, TAKE ONE TABLET BY MOUTH AT BEDTIME INSTEAD OF FLUVASTATIN FOR CHOLESTEROL, Disp: , Rfl:     vitamin E, tocopherol, 200 units capsule, Take 200 Units by mouth daily, Disp: , Rfl:     Lab Results   Component Value Date    SODIUM 141 02/20/2024    K 3.9 02/20/2024     02/20/2024    CO2 31 02/20/2024    AGAP 8 02/20/2024    BUN 27 (H) 02/20/2024    CREATININE 1.55 (H) 02/20/2024    GLUC 125 10/05/2022    GLUF 118 (H) 02/20/2024    CALCIUM 10.0 02/20/2024    AST 18 02/20/2024    ALT 20 02/20/2024    ALKPHOS 46 02/20/2024    TP 6.8 02/20/2024    TBILI 0.60 02/20/2024    EGFR 40 02/20/2024     Lab Results   Component Value Date    WBC 8.99 02/20/2024    HGB 15.2 02/20/2024    HCT 46.2 02/20/2024     (H) 02/20/2024     (L) 02/20/2024     Lab Results   Component Value Date    CHOLESTEROL 157 02/20/2024    CHOLESTEROL 140 03/13/2023    CHOLESTEROL 160 02/17/2021     Lab Results   Component Value Date    HDL 63 02/20/2024    HDL 67 03/13/2023    HDL 63 02/17/2021     Lab Results   Component Value Date    LDLCALC 74 02/20/2024    LDLCALC 53 03/13/2023    LDLCALC 82 02/17/2021     Lab Results   Component Value Date    TRIG 102 02/20/2024    TRIG 102 03/13/2023    TRIG 75 02/17/2021     No results found for: \"CHOLHDL\"  Lab Results   Component Value Date    YEY2QCXWTFLP 2.813 02/20/2024     Lab Results   Component Value Date    PTH 78.0 04/26/2023    CALCIUM 10.0 02/20/2024    PHOS 3.3 04/26/2023     Lab Results   Component Value Date    SPEP See Comment 04/20/2022    UPEP See Comment 04/21/2022     No results found for: \"MICROALBUR\", \"[id number]\"        Objective:      /74 " "(BP Location: Left arm, Patient Position: Sitting, Cuff Size: Standard)   Pulse 60   Ht 5' 7\" (1.702 m)   Wt 88.5 kg (195 lb)   SpO2 96%   BMI 30.54 kg/m²          Physical Exam  Vitals reviewed.   Constitutional:       General: He is not in acute distress.     Appearance: Normal appearance.   HENT:      Head: Normocephalic and atraumatic.      Right Ear: External ear normal.      Left Ear: External ear normal.   Eyes:      Conjunctiva/sclera: Conjunctivae normal.   Cardiovascular:      Rate and Rhythm: Normal rate and regular rhythm.      Heart sounds: Normal heart sounds.   Pulmonary:      Effort: No respiratory distress.      Breath sounds: No wheezing.   Abdominal:      Palpations: Abdomen is soft.   Skin:     General: Skin is warm and dry.   Neurological:      General: No focal deficit present.      Mental Status: He is alert and oriented to person, place, and time.   Psychiatric:         Mood and Affect: Mood normal.         Behavior: Behavior normal.         "

## 2024-03-26 NOTE — ASSESSMENT & PLAN NOTE
Patient is experiencing mild volume overloaded state versus asthma from allergens.  I am concerned of the increased volume due to mild edema bilaterally associated with significant increase in sodium in foods around the Easter holiday season.  I strongly recommended for the patient to eliminate all sodium intake as much as possible for the next 2 or 3 days.  He declined having a chest x-ray to further evaluate volume and pulmonary status in general.    Patient deferred more aggressive diuresis with a loop diuretic at this time.  In truth, if we will do more aggressive diuresis it would be short term, in order to get ahead of the volume status at this time which appears to be mildly volume overloaded.

## 2024-05-01 PROBLEM — Z00.00 MEDICARE ANNUAL WELLNESS VISIT, SUBSEQUENT: Status: RESOLVED | Noted: 2022-03-09 | Resolved: 2024-05-01

## 2024-05-31 ENCOUNTER — APPOINTMENT (OUTPATIENT)
Dept: LAB | Facility: CLINIC | Age: 84
End: 2024-05-31
Payer: COMMERCIAL

## 2024-05-31 DIAGNOSIS — C61 PROSTATE CANCER (HCC): ICD-10-CM

## 2024-05-31 LAB — PSA SERPL-MCNC: 67.25 NG/ML (ref 0–4)

## 2024-05-31 PROCEDURE — G0103 PSA SCREENING: HCPCS

## 2024-05-31 PROCEDURE — 36415 COLL VENOUS BLD VENIPUNCTURE: CPT

## 2024-06-24 ENCOUNTER — NURSE TRIAGE (OUTPATIENT)
Age: 84
End: 2024-06-24

## 2024-06-24 DIAGNOSIS — I70.213 ATHEROSCLEROSIS OF NATIVE ARTERIES OF EXTREMITIES WITH INTERMITTENT CLAUDICATION, BILATERAL LEGS (HCC): Primary | ICD-10-CM

## 2024-06-24 NOTE — TELEPHONE ENCOUNTER
"Pt was last seen on 8/16/2022 with Dr. Dodd. Pt had an ZAHIRA on 2/16/2023. Per consensus, pt is to have a repeat ZAHIRA in one year (around 2/16/2024). No order is currently placed for this.    Pt is calling to make a follow up appt for his b/l leg pain. He stated he is having the same pain as he did when he saw Dr. Dodd. Not any worse. He states the pain is in his b/l calves after walking for 15min. Denies any pain at rest. Denies redness, wounds, numbness/tingling, fever, chills, chest pain, sob.    Attempted to make f/u appt with Dr. Dodd however per the decision tree, the order for the ZAHIRA has to be placed and pt has to be scheduled for the ZAHIRA prior to making an appt in the office. Please place order if appropriate.         Answer Assessment - Initial Assessment Questions  1. ONSET: \"When did the pain start?\"       B/l leg pain starting 2 years ago. Staying the same.   2. LOCATION: \"Where is the pain located?\"       B/l calves. Pt states he can walk for 15 min before getting the pain. No pain at rest  3. PAIN: \"How bad is the pain?\"    (Scale 1-10; or mild, moderate, severe)    -  MILD (1-3): doesn't interfere with normal activities     -  MODERATE (4-7): interferes with normal activities (e.g., work or school) or awakens from sleep, limping     -  SEVERE (8-10): excruciating pain, unable to do any normal activities, unable to walk      7/10 when walking   4. WORK OR EXERCISE: \"Has there been any recent work or exercise that involved this part of the body?\"       denies  5. CAUSE: \"What do you think is causing the leg pain?\"      unsure  6. OTHER SYMPTOMS: \"Do you have any other symptoms?\" (e.g., chest pain, back pain, breathing difficulty, swelling, rash, fever, numbness, weakness)      Denies redness, wounds, rest pain, numbness/tingling, fever, chills, chest pain, sob    Protocols used: Leg Pain-ADULT-[place]    "

## 2024-06-24 NOTE — TELEPHONE ENCOUNTER
Reviewed. Patient due for updated ZAHIRA- order placed. Please schedule study and f/u with Dr. Dodd. Thanks!

## 2024-06-24 NOTE — TELEPHONE ENCOUNTER
Regarding: Bilateral leg pain  ----- Message from Freda THOMPSON sent at 6/24/2024 10:32 AM EDT -----  Patient is calling to re-establish care with Dr. Ju Iqbal. He was last seen in 2022, and has been having increasing pain in both of his legs since then. Last ZAHIRA 2/16/23.

## 2024-07-23 ENCOUNTER — HOSPITAL ENCOUNTER (OUTPATIENT)
Dept: NON INVASIVE DIAGNOSTICS | Facility: HOSPITAL | Age: 84
Discharge: HOME | End: 2024-07-23
Payer: COMMERCIAL

## 2024-07-23 DIAGNOSIS — I70.213 ATHEROSCLEROSIS OF NATIVE ARTERIES OF EXTREMITIES WITH INTERMITTENT CLAUDICATION, BILATERAL LEGS (HCC): ICD-10-CM

## 2024-07-23 PROCEDURE — 93925 LOWER EXTREMITY STUDY: CPT

## 2024-07-23 PROCEDURE — 93923 UPR/LXTR ART STDY 3+ LVLS: CPT

## 2024-07-24 PROCEDURE — 93922 UPR/L XTREMITY ART 2 LEVELS: CPT | Performed by: SURGERY

## 2024-07-24 PROCEDURE — 93925 LOWER EXTREMITY STUDY: CPT | Performed by: SURGERY

## 2024-07-30 ENCOUNTER — HOSPITAL ENCOUNTER (OUTPATIENT)
Dept: NUCLEAR MEDICINE | Facility: HOSPITAL | Age: 84
Discharge: HOME | End: 2024-07-30
Attending: UROLOGY
Payer: COMMERCIAL

## 2024-07-30 DIAGNOSIS — R97.20 ELEVATED PROSTATE SPECIFIC ANTIGEN (PSA): ICD-10-CM

## 2024-07-30 DIAGNOSIS — C61 MALIGNANT NEOPLASM OF PROSTATE (HCC): ICD-10-CM

## 2024-07-30 PROCEDURE — 78815 PET IMAGE W/CT SKULL-THIGH: CPT

## 2024-07-30 PROCEDURE — A9595 HB PIFLUFOLASTAT F-18, DIAGNOSTIC, 1 MILLICURIE: HCPCS

## 2024-08-07 PROCEDURE — 87086 URINE CULTURE/COLONY COUNT: CPT | Performed by: UROLOGY

## 2024-08-08 ENCOUNTER — LAB REQUISITION (OUTPATIENT)
Dept: LAB | Facility: HOSPITAL | Age: 84
End: 2024-08-08
Payer: COMMERCIAL

## 2024-08-08 DIAGNOSIS — N39.0 URINARY TRACT INFECTION, SITE NOT SPECIFIED: ICD-10-CM

## 2024-08-09 LAB — BACTERIA UR CULT: NORMAL

## 2024-08-20 ENCOUNTER — OFFICE VISIT (OUTPATIENT)
Dept: VASCULAR SURGERY | Facility: CLINIC | Age: 84
End: 2024-08-20
Payer: COMMERCIAL

## 2024-08-20 VITALS
HEIGHT: 67 IN | HEART RATE: 91 BPM | TEMPERATURE: 98 F | WEIGHT: 187.4 LBS | SYSTOLIC BLOOD PRESSURE: 148 MMHG | RESPIRATION RATE: 16 BRPM | BODY MASS INDEX: 29.41 KG/M2 | DIASTOLIC BLOOD PRESSURE: 80 MMHG | OXYGEN SATURATION: 97 %

## 2024-08-20 DIAGNOSIS — I70.213 ATHEROSCLEROSIS OF NATIVE ARTERIES OF EXTREMITIES WITH INTERMITTENT CLAUDICATION, BILATERAL LEGS (HCC): Primary | ICD-10-CM

## 2024-08-20 DIAGNOSIS — N18.31 STAGE 3A CHRONIC KIDNEY DISEASE (HCC): ICD-10-CM

## 2024-08-20 DIAGNOSIS — C61 MALIGNANT NEOPLASM OF PROSTATE (HCC): ICD-10-CM

## 2024-08-20 DIAGNOSIS — E78.5 HYPERLIPIDEMIA, UNSPECIFIED HYPERLIPIDEMIA TYPE: ICD-10-CM

## 2024-08-20 DIAGNOSIS — Z86.718 HISTORY OF DEEP VENOUS THROMBOSIS (DVT) OF DISTAL VEIN OF RIGHT LOWER EXTREMITY: ICD-10-CM

## 2024-08-20 DIAGNOSIS — I26.93 SINGLE SUBSEGMENTAL PULMONARY EMBOLISM WITHOUT ACUTE COR PULMONALE (HCC): ICD-10-CM

## 2024-08-20 PROBLEM — I73.9 PERIPHERAL ARTERIAL DISEASE (HCC): Status: RESOLVED | Noted: 2023-03-27 | Resolved: 2024-08-20

## 2024-08-20 PROCEDURE — 99214 OFFICE O/P EST MOD 30 MIN: CPT | Performed by: SURGERY

## 2024-08-20 RX ORDER — CILOSTAZOL 100 MG/1
100 TABLET ORAL 2 TIMES DAILY
Qty: 180 TABLET | Refills: 4 | Status: SHIPPED | OUTPATIENT
Start: 2024-08-20

## 2024-08-20 NOTE — PROGRESS NOTES
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; there is complete occlusion of the SFA from the origin to [place]; on the L, there is a mid segment SFA occlusion w/ diffuse stenosis; unfortunately, the closure device on the L CFA access caused a dissection plane as it was inserted and this was treated with EIA stent 8/3/22  -oddly, patient noted improvement in his BLE s/p procedure despite no improvements made in perfusion and no further intervention was done; lost to followup since then; did discuss options for revasc if this was needed in the future; on the right, this would best be treated with femoral endarterectomy +/- fem-BK pop bypass w/ treatment of the profunda aneurysm (not necessary purely based on size, but would correct it at same time if operating); on the LLE, this could likely be treated endovascularly with PTA/stent  -reviewed LEADs which show R: 0.55/127/62 and L: 0.70/119/76 w/ R SFA occlusions, L SFA stenosis, R profunda aneurysm 14mm  -symptoms are stable since last visit and although bothersome, he is still able to do ADL and some exercise; he has new issues of meta prostate cancer and worsened CKD; thsu I do not recommend intervention at this time; did discuss if he developed rest pain or wounds, would need to RTC and discuss intervention  -will get repeat LEADs in 12 months  -f/u 1 year or sooner if worsening symptoms or wounds    Stage 3a chronic kidney disease (HCC)  -Cr: 1.55  GFR: 40    Single subsegmental pulmonary embolism without acute cor pulmonale (HCC)  History of deep venous thrombosis (DVT) of  distal vein of right lower extremity  -from what I can gather, had RLE DVT, unprovoked, about '10 and provoked PE in 4/22  -will be on lifelong anticoagulation; also w/ malignancy    Prostate neoplasm  -follows with Dr. Delgado  -most recent PET from last month shows progression of prostate cancer and new local mets  -per patient, supposed to start hormone therapy    Hyperlipidemia, unspecified hyperlipidemia type  Medication  -cont ASA, Eliquis (hx of DVT/PE) , statin    Subjective:     Patient ID: Daryn Goss is a 84 y.o. male.    HPI:     Patient presents to review testing and discuss PAD    Patient complains of calf pain with ambulation.  He uses a cane for ambulation.  He can get around the store for shopping if he holds a cart or 1/2 block on stable ground and can get up the 23 steps to his house but at the end of this has to stop and rest.  Symptoms resolve with rest.  SYmptoms are worst on the right.  Denies rest pain or wounds.  He has a stationary bike and uses this 5min/day.    He underwent angiogram in '22 by me which was diagnostic but no further interventions.  Oddly, his symptoms improved after this.     In April of '22, he notes Flu, fall w/ rib fractures, and PE.  Has hx of RLE DVT about 10 years ago, unprovoked from what I can tell. Chronic RLE DVT was also noted on recent arterial DU     Prior smoker, quit '79     Takes Eliquis for PE prevention longterm.  Takes ASA/pravastatin.          Patient is here today to review ZAHIRA he had done on 7/23/24. Patient has c/o pain in feet and legs that worsens with walking or going up steps. Patient denied any numbness or tingling. Patient is on Eliquis, ASA 81mg, and Pravastatin. Patient is a former smoker.      Review of Systems   Constitutional: Negative.    HENT: Negative.     Eyes: Negative.    Respiratory: Negative.     Cardiovascular: Negative.    Gastrointestinal: Negative.    Endocrine: Negative.    Genitourinary: Negative.    Musculoskeletal:  "Negative.    Skin: Negative.    Allergic/Immunologic: Negative.    Neurological: Negative.    Hematological: Negative.    Psychiatric/Behavioral: Negative.           Objective:     Physical Exam  Cardiovascular:      Rate and Rhythm: Normal rate and regular rhythm.      Pulses:           Radial pulses are 2+ on the right side and 2+ on the left side.        Dorsalis pedis pulses are 0 on the right side and 0 on the left side.        Posterior tibial pulses are 0 on the right side and 0 on the left side.      Heart sounds: No murmur heard.  Pulmonary:      Effort: Pulmonary effort is normal.      Breath sounds: Rhonchi present.   Musculoskeletal:      Right lower le+ Edema present.      Left lower le+ Edema present.   Skin:     Comments: No wounds B feet; mild stassis changes with darkening, hair loss, dryness, spiders at B medial ankles with some sclerotic skin changes R>L           I have reviewed and made appropriate changes to the review of systems input by the medical assistant.    Vitals:    24 0927   BP: 148/80   BP Location: Right arm   Patient Position: Sitting   Cuff Size: Adult   Pulse: 91   Resp: 16   Temp: 98 °F (36.7 °C)   TempSrc: Temporal   SpO2: 97%   Weight: 85 kg (187 lb 6.4 oz)   Height: 5' 7\" (1.702 m)       Patient Active Problem List   Diagnosis   • Trochanteric bursitis of left hip   • Essential hypertension   • Hyperlipidemia   • History of deep venous thrombosis (DVT) of distal vein of right lower extremity   • Prediabetes   • Tremor of both hands   • Atherosclerosis of native arteries of extremities with intermittent claudication, bilateral legs (HCC)   • Pulmonary embolus (HCC)   • Abnormal CT scan   • History of rib fracture   • Nodule of kidney   • History of acute respiratory failure   • History of sepsis   • 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)   • Former smoker   • Chronic anticoagulation   • History of kidney injury   • Stage 3a chronic " kidney disease (HCC)   • Acquired renal cyst of left kidney   • Angiomyolipoma of left kidney   • Personal history of COVID-19   • Platelets decreased (HCC)   • Malignant neoplasm of prostate (HCC)   • Wheezing   • Bilateral lower extremity edema       Past Surgical History:   Procedure Laterality Date   • HERNIA REPAIR     • IR ABDOMINAL AORTAGRAM  8/3/2022   • KNEE ARTHROSCOPY Right 2015   • PROSTATE SURGERY         Family History   Problem Relation Age of Onset   • Lung cancer Mother    • Ulcers Father    • No Known Problems Sister    • Heart attack Brother    • Alcohol abuse Brother    • Kidney disease Brother        Social History     Socioeconomic History   • Marital status: /Civil Union     Spouse name: Not on file   • Number of children: Not on file   • Years of education: Not on file   • Highest education level: Not on file   Occupational History   • Not on file   Tobacco Use   • Smoking status: Former     Current packs/day: 0.00     Types: Cigarettes     Quit date:      Years since quittin.6   • Smokeless tobacco: Never   • Tobacco comments:     2+ppd x 20 years    Vaping Use   • Vaping status: Never Used   Substance and Sexual Activity   • Alcohol use: Not Currently   • Drug use: Not Currently   • Sexual activity: Not Currently   Other Topics Concern   • Not on file   Social History Narrative    Retired, lives with his wife, boubacar and great-grandson    [employer] [phone]     Social Determinants of Health     Financial Resource Strain: Low Risk  (3/10/2023)    Overall Financial Resource Strain (CARDIA)    • Difficulty of Paying Living Expenses: Not very hard   Food Insecurity: No Food Insecurity (3/12/2024)    Hunger Vital Sign    • Worried About Running Out of Food in the Last Year: Never true    • Ran Out of Food in the Last Year: Never true   Transportation Needs: No Transportation Needs (3/12/2024)    PRAPARE - Transportation    • Lack of Transportation  (Medical): No    • Lack of Transportation (Non-Medical): No   Physical Activity: Not on file   Stress: Not on file   Social Connections: Not on file   Intimate Partner Violence: Not on file   Housing Stability: Unknown (3/12/2024)    Housing Stability Vital Sign    • Unable to Pay for Housing in the Last Year: No    • Number of Times Moved in the Last Year: Not on file    • Homeless in the Last Year: No       No Known Allergies      Current Outpatient Medications:   •  apixaban (ELIQUIS) 5 mg, Take 1 tablet (5 mg total) by mouth 2 (two) times a day, Disp: 60 tablet, Rfl: 0  •  ascorbic Acid (VITAMIN C) 500 MG CPCR, Take 500 mg by mouth daily, Disp: , Rfl:   •  ASPIRIN 81 PO, Take by mouth, Disp: , Rfl:   •  b complex vitamins capsule, Take 1 capsule by mouth daily, Disp: , Rfl:   •  cholecalciferol (VITAMIN D3) 400 units tablet, Take 800 Units by mouth daily , Disp: , Rfl:   •  cilostazol (PLETAL) 100 mg tablet, Take 1 tablet (100 mg total) by mouth 2 (two) times a day, Disp: 180 tablet, Rfl: 4  •  diltiazem (DILACOR XR) 240 MG 24 hr capsule, Take 1 capsule (240 mg total) by mouth daily, Disp: 90 capsule, Rfl: 1  •  lisinopril-hydrochlorothiazide (PRINZIDE,ZESTORETIC) 20-25 MG per tablet, Take 1 tablet by mouth daily, Disp: , Rfl:   •  magnesium 30 MG tablet, Take 30 mg by mouth daily, Disp: , Rfl:   •  Omega-3 Fatty Acids (Fish Oil) 1200 MG CAPS, Take by mouth, Disp: , Rfl:   •  patient supplied medication, RED BEET EXTRACT, Disp: , Rfl:   •  Potassium 99 MG TABS, Take 1 tablet by mouth daily  , Disp: , Rfl:   •  pravastatin (PRAVACHOL) 40 mg tablet, TAKE ONE TABLET BY MOUTH AT BEDTIME INSTEAD OF FLUVASTATIN FOR CHOLESTEROL, Disp: , Rfl:   •  vitamin E, tocopherol, 200 units capsule, Take 200 Units by mouth daily, Disp: , Rfl:

## 2024-08-28 ENCOUNTER — HOSPITAL ENCOUNTER (OUTPATIENT)
Dept: ULTRASOUND IMAGING | Facility: HOSPITAL | Age: 84
Discharge: HOME | End: 2024-08-28
Attending: UROLOGY
Payer: COMMERCIAL

## 2024-08-28 DIAGNOSIS — R31.0 GROSS HEMATURIA: ICD-10-CM

## 2024-08-28 PROCEDURE — 76775 US EXAM ABDO BACK WALL LIM: CPT

## 2024-09-09 ENCOUNTER — RA CDI HCC (OUTPATIENT)
Dept: OTHER | Facility: HOSPITAL | Age: 84
End: 2024-09-09

## 2024-09-13 ENCOUNTER — OFFICE VISIT (OUTPATIENT)
Dept: FAMILY MEDICINE CLINIC | Facility: CLINIC | Age: 84
End: 2024-09-13
Payer: COMMERCIAL

## 2024-09-13 ENCOUNTER — APPOINTMENT (OUTPATIENT)
Dept: LAB | Facility: CLINIC | Age: 84
End: 2024-09-13
Payer: COMMERCIAL

## 2024-09-13 VITALS
HEIGHT: 67 IN | DIASTOLIC BLOOD PRESSURE: 80 MMHG | SYSTOLIC BLOOD PRESSURE: 130 MMHG | RESPIRATION RATE: 17 BRPM | HEART RATE: 110 BPM | WEIGHT: 187.6 LBS | OXYGEN SATURATION: 96 % | TEMPERATURE: 98.9 F | BODY MASS INDEX: 29.44 KG/M2

## 2024-09-13 DIAGNOSIS — R73.03 PREDIABETES: Primary | ICD-10-CM

## 2024-09-13 DIAGNOSIS — R31.0 GROSS HEMATURIA: ICD-10-CM

## 2024-09-13 DIAGNOSIS — C61 MALIGNANT NEOPLASM OF PROSTATE (HCC): ICD-10-CM

## 2024-09-13 DIAGNOSIS — Z79.01 CHRONIC ANTICOAGULATION: ICD-10-CM

## 2024-09-13 DIAGNOSIS — Z01.818 PREOP TESTING: ICD-10-CM

## 2024-09-13 DIAGNOSIS — S31.819A BUTTOCK WOUND, RIGHT, INITIAL ENCOUNTER: ICD-10-CM

## 2024-09-13 DIAGNOSIS — N18.31 STAGE 3A CHRONIC KIDNEY DISEASE (HCC): ICD-10-CM

## 2024-09-13 DIAGNOSIS — Z86.711 HISTORY OF PULMONARY EMBOLUS (PE): ICD-10-CM

## 2024-09-13 DIAGNOSIS — I82.501 CHRONIC DEEP VEIN THROMBOSIS (DVT) OF RIGHT LOWER EXTREMITY, UNSPECIFIED VEIN (HCC): ICD-10-CM

## 2024-09-13 DIAGNOSIS — I10 ESSENTIAL HYPERTENSION: ICD-10-CM

## 2024-09-13 DIAGNOSIS — Z01.818 PREOPERATIVE CLEARANCE: ICD-10-CM

## 2024-09-13 LAB
ALBUMIN SERPL BCG-MCNC: 4.1 G/DL (ref 3.5–5)
ALP SERPL-CCNC: 44 U/L (ref 34–104)
ALT SERPL W P-5'-P-CCNC: 11 U/L (ref 7–52)
ANION GAP SERPL CALCULATED.3IONS-SCNC: 8 MMOL/L (ref 4–13)
APTT PPP: 33 SECONDS (ref 23–34)
AST SERPL W P-5'-P-CCNC: 12 U/L (ref 13–39)
BILIRUB SERPL-MCNC: 0.77 MG/DL (ref 0.2–1)
BUN SERPL-MCNC: 28 MG/DL (ref 5–25)
CALCIUM SERPL-MCNC: 9.5 MG/DL (ref 8.4–10.2)
CHLORIDE SERPL-SCNC: 104 MMOL/L (ref 96–108)
CO2 SERPL-SCNC: 29 MMOL/L (ref 21–32)
CREAT SERPL-MCNC: 1.65 MG/DL (ref 0.6–1.3)
ERYTHROCYTE [DISTWIDTH] IN BLOOD BY AUTOMATED COUNT: 14.4 % (ref 11.6–15.1)
GFR SERPL CREATININE-BSD FRML MDRD: 37 ML/MIN/1.73SQ M
GLUCOSE P FAST SERPL-MCNC: 123 MG/DL (ref 65–99)
HCT VFR BLD AUTO: 43.5 % (ref 36.5–49.3)
HGB BLD-MCNC: 14 G/DL (ref 12–17)
INR PPP: 1.19 (ref 0.85–1.19)
MCH RBC QN AUTO: 33.2 PG (ref 26.8–34.3)
MCHC RBC AUTO-ENTMCNC: 32.2 G/DL (ref 31.4–37.4)
MCV RBC AUTO: 103 FL (ref 82–98)
PLATELET # BLD AUTO: 154 THOUSANDS/UL (ref 149–390)
PMV BLD AUTO: 11.9 FL (ref 8.9–12.7)
POTASSIUM SERPL-SCNC: 3.8 MMOL/L (ref 3.5–5.3)
PROT SERPL-MCNC: 7.1 G/DL (ref 6.4–8.4)
PROTHROMBIN TIME: 15.4 SECONDS (ref 12.3–15)
RBC # BLD AUTO: 4.22 MILLION/UL (ref 3.88–5.62)
SODIUM SERPL-SCNC: 141 MMOL/L (ref 135–147)
WBC # BLD AUTO: 11.42 THOUSAND/UL (ref 4.31–10.16)

## 2024-09-13 PROCEDURE — G2211 COMPLEX E/M VISIT ADD ON: HCPCS | Performed by: FAMILY MEDICINE

## 2024-09-13 PROCEDURE — 36415 COLL VENOUS BLD VENIPUNCTURE: CPT

## 2024-09-13 PROCEDURE — 85610 PROTHROMBIN TIME: CPT

## 2024-09-13 PROCEDURE — 85730 THROMBOPLASTIN TIME PARTIAL: CPT

## 2024-09-13 PROCEDURE — 85027 COMPLETE CBC AUTOMATED: CPT

## 2024-09-13 PROCEDURE — 99214 OFFICE O/P EST MOD 30 MIN: CPT | Performed by: FAMILY MEDICINE

## 2024-09-13 PROCEDURE — 80053 COMPREHEN METABOLIC PANEL: CPT

## 2024-09-13 NOTE — PROGRESS NOTES
"Ambulatory Visit  Name: Daryn Goss      : 1940      MRN: [id number]  Encounter Provider: Ashlee Fleming DO  Encounter Date: 2024   Encounter department: [facility]    Assessment & Plan  Prediabetes  Stable, controlled, cpm       Essential hypertension  Ideally controlled, cpm       Buttock wound, right, initial encounter    Orders:  •  Ambulatory Referral to Wound Care; Future    Chronic anticoagulation  Eliquis       Chronic deep vein thrombosis (DVT) of right lower extremity, unspecified vein (HCC)         Preoperative clearance    Orders:  •  ECG 12 lead; Future    Preop testing    Orders:  •  ECG 12 lead; Future       Chief Complaint   Patient presents with   • Follow-up     6mo f/u     • Skin Problem     \"Wound on behind\" - couple years, but was embarrassed to mention before       History of Present Illness     Scheduled OV  Interval hx states he had bleeding from his prostate- saw his urol and is going to have to have surgery- no urol note in encounters tab or in media section - pt states he sees dr hayes   No date scheduled yet for the surgery per pt, but pt also brought in paper rx from Dr. Hayes with handwritten request for preop medical clearance   Also reports \"Wound on behind\" - constant for \"couple years, but was embarrassed\" to mention it before - right buttock area, feels rough, hurts sometimes to sit, no bleeding or drainage  Tried cortaid at one time without benefit          Review of Systems        Objective     /80 (BP Location: Left arm, Patient Position: Sitting, Cuff Size: Standard)   Pulse (!) 110   Temp 98.9 °F (37.2 °C) (Tympanic)   Resp 17   Ht 5' 7\" (1.702 m)   Wt 85.1 kg (187 lb 9.6 oz)   SpO2 96%   BMI 29.38 kg/m²     Physical Exam    "

## 2024-09-15 PROBLEM — R06.2 WHEEZING: Status: RESOLVED | Noted: 2024-03-26 | Resolved: 2024-09-15

## 2024-09-15 PROBLEM — Z86.711 HISTORY OF PULMONARY EMBOLUS (PE): Status: ACTIVE | Noted: 2022-04-18

## 2024-09-15 PROBLEM — S31.819A BUTTOCK WOUND, RIGHT, INITIAL ENCOUNTER: Status: ACTIVE | Noted: 2024-09-15

## 2024-09-16 ENCOUNTER — OFFICE VISIT (OUTPATIENT)
Dept: LAB | Facility: HOSPITAL | Age: 84
End: 2024-09-16
Payer: COMMERCIAL

## 2024-09-16 DIAGNOSIS — Z01.818 PREOPERATIVE CLEARANCE: ICD-10-CM

## 2024-09-16 DIAGNOSIS — Z01.818 PREOP TESTING: ICD-10-CM

## 2024-09-16 LAB
ATRIAL RATE: 63 BPM
P AXIS: 62 DEGREES
PR INTERVAL: 238 MS
QRS AXIS: 31 DEGREES
QRSD INTERVAL: 100 MS
QT INTERVAL: 418 MS
QTC INTERVAL: 427 MS
T WAVE AXIS: 65 DEGREES
VENTRICULAR RATE: 63 BPM

## 2024-09-16 PROCEDURE — 93010 ELECTROCARDIOGRAM REPORT: CPT | Performed by: INTERNAL MEDICINE

## 2024-09-16 PROCEDURE — 93005 ELECTROCARDIOGRAM TRACING: CPT

## 2024-09-16 NOTE — PROGRESS NOTES
"PRE-OPERATIVE EVALUATION  [facility]AMILY PRACTICE AT [place]    NAME: Daryn Goss  AGE: 84 y.o. SEX: male  : 1940     DATE: 9/15/2024    Internal Medicine Pre-Operative Evaluation      Chief Complaint: Pre-operative Evaluation     Surgery: prostate  Anticipated Date of Surgery: TBD  Referring Provider: Dr. HEBER Hayes     History of Present Illness:   Visit today scheduled as pt's routine f/u OV  Interval hx pt states he had bleeding from his prostate- saw his urol and is going to have to have surgery- no urol note in encounters tab or in media section - pt states he sees dr hayes   No date scheduled yet for the surgery per pt, but pt also brought in paper rx from Dr. Hayes with handwritten request for preop medical clearance   Also reports \"Wound on behind\" - constant for \"couple years, but was embarrassed\" to mention it before - right buttock area, feels rough, hurts sometimes to sit, no bleeding or drainage  Tried cortaid at one time without benefit    Daryn Goss is a 84 y.o. male who presents to the office today for a preoperative consultation. Planned anesthesia is  choice . Patient has a bleeding risk of: no recent abnormal bleeding, no remote history of abnormal bleeding, and use of Ca-channel blockers (see med list). Patient does not have objections to receiving blood products if needed. Current anti-platelet/anti-coagulation medications that the patient is prescribed includes: Aspirin and Apixaban (Eliquis).      Assessment of Chronic Conditions:    Prediabetes  Stable, controlled, cpm       Essential hypertension  Ideally controlled, stable,cpm               Chronic anticoagulation  Eliquis rx'd; also takes ASA        Chronic deep vein thrombosis (DVT) of right lower extremity, unspecified vein (HCC)         History of pulmonary embolus (PE)      2022       Stage 3a chronic kidney disease (HCC)  Stable, avoid nephrotoxins; cpm                             Assessment of Cardiac Risk:  Denies " unstable or severe angina or MI in the last 6 weeks or history of stent placement in the last year   Denies decompensated heart failure (e.g. New onset heart failure, NYHA functional class IV heart failure, or worsening existing heart failure)  Denies significant arrhythmias such as high grade AV block, symptomatic ventricular arrhythmia, newly recognized ventricular tachycardia, supraventricular tachycardia with resting heart rate >100, or symptomatic bradycardia  Denies severe heart valve disease including aortic stenosis or symptomatic mitral stenosis     Exercise Capacity:  Able to walk 4 blocks without symptoms?: Yes  Able to walk 2 flights without symptoms?: Yes    Prior Anesthesia Reactions: No     Personal history of venous thromboembolic disease? Yes    History of steroid use for >2 weeks within last year? No    STOP-BANG Sleep Apnea Screening Questionnaire:  no witnessed apnea events       Review of Systems:     Review of Systems   Constitutional: Negative.    HENT:  Negative for nosebleeds.    Respiratory: Negative.     Cardiovascular: Negative.    Gastrointestinal: Negative.    Genitourinary:  Negative for hematuria.   Neurological: Negative.    Hematological: Negative.        Current Problem List:     Patient Active Problem List   Diagnosis    Trochanteric bursitis of left hip    Essential hypertension    Hyperlipidemia    History of deep venous thrombosis (DVT) of distal vein of right lower extremity    Prediabetes    Tremor of both hands    Atherosclerosis of native arteries of extremities with intermittent claudication, bilateral legs (HCC)    History of pulmonary embolus (PE)    Abnormal CT scan    History of rib fracture    Nodule of kidney    History of acute respiratory failure    History of sepsis    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)    Former smoker    Chronic anticoagulation    History of kidney injury    Stage 3a chronic kidney disease (HCC)    Acquired renal  "cyst of left kidney    Angiomyolipoma of left kidney    Personal history of COVID-19    Platelets decreased (HCC)    Malignant neoplasm of prostate (HCC)    Bilateral lower extremity edema    Buttock wound, right, initial encounter       Allergies:     No Known Allergies    Physical Exam:     Vitals:    09/13/24 0949   BP: 130/80   BP Location: Left arm   Patient Position: Sitting   Cuff Size: Standard   Pulse: (!) 110   Resp: 17   Temp: 98.9 °F (37.2 °C)   TempSrc: Tympanic   SpO2: 96%   Weight: 85.1 kg (187 lb 9.6 oz)   Height: 5' 7\" (1.702 m)           Current Outpatient Medications:     apixaban (ELIQUIS) 5 mg, Take 1 tablet (5 mg total) by mouth 2 (two) times a day, Disp: 60 tablet, Rfl: 0    ascorbic Acid (VITAMIN C) 500 MG CPCR, Take 500 mg by mouth daily, Disp: , Rfl:     ASPIRIN 81 PO, Take by mouth, Disp: , Rfl:     b complex vitamins capsule, Take 1 capsule by mouth daily, Disp: , Rfl:     cholecalciferol (VITAMIN D3) 400 units tablet, Take 800 Units by mouth daily , Disp: , Rfl:     cilostazol (PLETAL) 100 mg tablet, Take 1 tablet (100 mg total) by mouth 2 (two) times a day, Disp: 180 tablet, Rfl: 4    diltiazem (DILACOR XR) 240 MG 24 hr capsule, Take 1 capsule (240 mg total) by mouth daily, Disp: 90 capsule, Rfl: 1    lisinopril-hydrochlorothiazide (PRINZIDE,ZESTORETIC) 20-25 MG per tablet, Take 1 tablet by mouth daily, Disp: , Rfl:     magnesium 30 MG tablet, Take 30 mg by mouth daily, Disp: , Rfl:     Omega-3 Fatty Acids (Fish Oil) 1200 MG CAPS, Take by mouth, Disp: , Rfl:     patient supplied medication, RED BEET EXTRACT, Disp: , Rfl:     Potassium 99 MG TABS, Take 1 tablet by mouth daily  , Disp: , Rfl:     pravastatin (PRAVACHOL) 40 mg tablet, TAKE ONE TABLET BY MOUTH AT BEDTIME INSTEAD OF FLUVASTATIN FOR CHOLESTEROL, Disp: , Rfl:     vitamin E, tocopherol, 200 units capsule, Take 200 Units by mouth daily, Disp: , Rfl:     Past Medical History:       Past Medical History:   Diagnosis Date    Acute " kidney injury (HCC) 2022    PATRICK (acute kidney injury) (HCC) 2022    BMI 28.0-28.9,adult 2021    BMI 30.0-30.9,adult 3/15/2023    Fracture of rib of left side with routine healing 2022    Hx of blood clots     Hyperlipidemia     Hypertension     Influenza A 2022    Sepsis (HCC) 2022        Past Surgical History:   Procedure Laterality Date    HERNIA REPAIR      IR ABDOMINAL AORTAGRAM  8/3/2022    KNEE ARTHROSCOPY Right 2015    PROSTATE SURGERY          Family History   Problem Relation Age of Onset    Lung cancer Mother     Ulcers Father     No Known Problems Sister     Heart attack Brother     Alcohol abuse Brother     Kidney disease Brother         Social History     Socioeconomic History    Marital status: /Civil Union     Spouse name: Not on file    Number of children: Not on file    Years of education: Not on file    Highest education level: Not on file   Occupational History    Not on file   Tobacco Use    Smoking status: Former     Current packs/day: 0.00     Types: Cigarettes     Quit date:      Years since quittin.7    Smokeless tobacco: Never    Tobacco comments:     2+ppd x 20 years    Vaping Use    Vaping status: Never Used   Substance and Sexual Activity    Alcohol use: Not Currently    Drug use: Not Currently    Sexual activity: Not Currently   Other Topics Concern    Not on file   Social History Narrative    Retired, lives with his wife, grandjuju and great-grandson    [employer] [phone]     Social Determinants of Health     Financial Resource Strain: Low Risk  (3/10/2023)    Overall Financial Resource Strain (CARDIA)     Difficulty of Paying Living Expenses: Not very hard   Food Insecurity: No Food Insecurity (3/12/2024)    Hunger Vital Sign     Worried About Running Out of Food in the Last Year: Never true     Ran Out of Food in the Last Year: Never true   Transportation Needs: No Transportation Needs (3/12/2024)    PRAPARE -  Transportation     Lack of Transportation (Medical): No     Lack of Transportation (Non-Medical): No   Physical Activity: Not on file   Stress: Not on file   Social Connections: Not on file   Intimate Partner Violence: Not on file   Housing Stability: Unknown (3/12/2024)    Housing Stability Vital Sign     Unable to Pay for Housing in the Last Year: No     Number of Times Moved in the Last Year: Not on file     Homeless in the Last Year: No        Physical Exam:     Physical Exam  Vitals and nursing note reviewed.   Constitutional:       General: He is not in acute distress.     Appearance: He is well-developed and well-groomed. He is not ill-appearing, toxic-appearing or diaphoretic.      Comments: Extremely pleasant as always   HENT:      Head: Normocephalic and atraumatic.      Nose: Nose normal.      Mouth/Throat:      Lips: Pink.      Mouth: Oropharynx is clear and moist and mucous membranes are normal. Mucous membranes are moist.      Pharynx: Oropharynx is clear. Uvula midline.   Eyes:      General: Lids are normal.      Extraocular Movements: Extraocular movements intact.      Conjunctiva/sclera: Conjunctivae normal.      Pupils: Pupils are equal, round, and reactive to light.   Neck:      Thyroid: No thyroid mass, thyromegaly or thyroid tenderness.      Vascular: Normal carotid pulses. No JVD.      Trachea: Trachea and phonation normal.   Cardiovascular:      Rate and Rhythm: Normal rate and regular rhythm.      Pulses: Normal pulses.      Heart sounds: Normal heart sounds.   Pulmonary:      Effort: Pulmonary effort is normal.      Breath sounds: Normal breath sounds and air entry.   Abdominal:      General: Bowel sounds are normal. There is no distension.      Palpations: Abdomen is soft. There is no hepatomegaly, splenomegaly or mass.      Tenderness: There is no abdominal tenderness.   Musculoskeletal:      Cervical back: Neck supple.      Right lower leg: No edema.      Left lower leg: No edema.    Lymphadenopathy:      Cervical: No cervical adenopathy.   Skin:     General: Skin is warm and dry.      Capillary Refill: Capillary refill takes less than 2 seconds.      Coloration: Skin is not pale.             Comments: Right buttock decubitus ulcer wound; no drainage or surrounding erythema; chaperone present alongside throughout exam     Neurological:      General: No focal deficit present.      Mental Status: He is alert and oriented to person, place, and time.      Gait: Gait normal.   Psychiatric:         Mood and Affect: Mood and affect and mood normal.         Behavior: Behavior normal. Behavior is cooperative.         Cognition and Memory: Cognition and memory normal.          Data:     Pre-operative work-up    Laboratory Results:  preop labwork pending    EKG: ordered today -  to be obtained by pt at ambulatory test facility  (ADDENDUM)     Narrative & Impression    Sinus rhythm with sinus arrhythmia with 1st degree A-V block  Otherwise normal ECG  When compared with ECG of 18-APR-2022 23:05,  No significant change was found  Confirmed by Casey Lindsey ([id number]) on 9/16/2024 12:12:54 PM       Specimen Collected: 09/16/24 11:38 AM Last Resulted: 09/16/24 12:12 PM                    Chest x-ray: No pertinent imaging studies reviewed.    Previous cardiopulmonary studies within the past year:  Echocardiogram: none  Cardiac Catheterization: none  Stress Test: none  Pulmonary Function Testing: none      Assessment & Recommendations:     1. Prediabetes        2. Essential hypertension        3. Buttock wound, right, initial encounter  Ambulatory Referral to Wound Care      4. Chronic anticoagulation        5. Chronic deep vein thrombosis (DVT) of right lower extremity, unspecified vein (HCC)        6. History of pulmonary embolus (PE)      5/11/2022      7. Stage 3a chronic kidney disease (HCC)        8. Preoperative clearance  ECG 12 lead      9. Preop testing  ECG 12 lead          Pre-Op Evaluation  Assessment  84 y.o. male with planned surgery: prostate.    Known risk factors for perioperative complications: Renal dysfunction.      Cardiac Risk Estimation: per the Revised Cardiac Risk Index (Circ. 100:1043, 1999), the patient's risk factors for cardiac complications include  none , putting him in: RCI RISK CLASS I (0 risk factors, risk of major cardiac compl. appr. 0.5%).    Current medications which may produce withdrawal symptoms if withheld perioperatively: none.    Pre-Op Evaluation Plan  1. Further preoperative workup as follows:   - ECG  - Complete blood count  - Basic metabolic profile  - Hepatic function panel  - Coagulation studies    2. Medication Management/Recommendations:   - Patient has been instructed to avoid herbs or non-directed vitamins the week prior to surgery to ensure no drug interactions with perioperative surgical and anesthetic medications.  - Patient has been instructed to avoid aspirin containing medications or non-steroidal anti-inflammatory drugs for the week preceding surgery.  - Regarding anti-platelet agents: hold Eliquis for 2 days prior to surgery.    3. Prophylaxis for cardiac events with perioperative beta-blockers: not indicated.    4. Patient requires further consultation with:  pending EKG      Clearance   Clearance pending preop labwork and EKG results    ADDENDUM 9/17/24:  Preop labs and EKG were obtained and reviewed by me and patient is CLEARED for above surgical procedure       Ashlee Fleming DO  FAMILY PRACTICE AT [address] [address]  Phone#  [phone]  Fax#  [phone]

## 2024-09-20 ENCOUNTER — OFFICE VISIT (OUTPATIENT)
Facility: HOSPITAL | Age: 84
End: 2024-09-20
Payer: COMMERCIAL

## 2024-09-20 VITALS
TEMPERATURE: 98.4 F | SYSTOLIC BLOOD PRESSURE: 149 MMHG | RESPIRATION RATE: 18 BRPM | DIASTOLIC BLOOD PRESSURE: 79 MMHG | HEART RATE: 87 BPM

## 2024-09-20 DIAGNOSIS — L98.9 SYMPTOMATIC LESION OF SKIN: Primary | ICD-10-CM

## 2024-09-20 PROCEDURE — G0463 HOSPITAL OUTPT CLINIC VISIT: HCPCS | Performed by: STUDENT IN AN ORGANIZED HEALTH CARE EDUCATION/TRAINING PROGRAM

## 2024-09-20 PROCEDURE — 99203 OFFICE O/P NEW LOW 30 MIN: CPT | Performed by: STUDENT IN AN ORGANIZED HEALTH CARE EDUCATION/TRAINING PROGRAM

## 2024-09-20 PROCEDURE — 99212 OFFICE O/P EST SF 10 MIN: CPT | Performed by: STUDENT IN AN ORGANIZED HEALTH CARE EDUCATION/TRAINING PROGRAM

## 2024-09-21 NOTE — PROGRESS NOTES
Patient ID: Daryn Goss is a 84 y.o. male Date of Birth 1940       Chief Complaint   Patient presents with    New Patient Visit     Pt and wife.  Pt presented with pain on rear end and dealt with it for couple years. Phone number given to patient of nearby [facility] office due to no open wound on right buttocks. Dry scaly hardened skin noted to right buttocks.     No Wound Consult       Allergies:  Patient has no known allergies.    Diagnosis:   Diagnosis ICD-10-CM Associated Orders   1. Symptomatic lesion of skin  L98.9            Assessment  & Plan:    Skin of the buttock region evaluated.  There is no open wound present.  There is a small area of raised skin with dry crusting scale present.  No underlying fluctuance.  No erythema to indicate soft tissue infection.  There is a possibility that this lesion is a squamous cell skin carcinoma.  Patient is best served to be seen by a dermatologist.  Phone number provided of a local dermatologist.         Subjective:   09/20/2024: 84-year-old male presents for evaluation of skin on his R buttocks which is sore and has caused discomfort for multiple years now. Does not have an open wound.           The following portions of the patient's history were reviewed and updated as appropriate:   Patient Active Problem List   Diagnosis    Trochanteric bursitis of left hip    Essential hypertension    Hyperlipidemia    History of deep venous thrombosis (DVT) of distal vein of right lower extremity    Prediabetes    Tremor of both hands    Atherosclerosis of native arteries of extremities with intermittent claudication, bilateral legs (HCC)    History of pulmonary embolus (PE)    Abnormal CT scan    History of rib fracture    Nodule of kidney    History of acute respiratory failure    History of sepsis    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)    Former smoker    Chronic anticoagulation    History of kidney injury    Stage 3a  chronic kidney disease (HCC)    Acquired renal cyst of left kidney    Angiomyolipoma of left kidney    Personal history of COVID-19    Platelets decreased (HCC)    Malignant neoplasm of prostate (HCC)    Bilateral lower extremity edema    Buttock wound, right, initial encounter     Past Medical History:   Diagnosis Date    Acute kidney injury (HCC) 2022    PATRICK (acute kidney injury) (HCC) 2022    BMI 28.0-28.9,adult 2021    BMI 30.0-30.9,adult 3/15/2023    Fracture of rib of left side with routine healing 2022    Hx of blood clots     Hyperlipidemia     Hypertension     Influenza A 2022    Sepsis (HCC) 2022     Past Surgical History:   Procedure Laterality Date    HERNIA REPAIR      IR ABDOMINAL AORTAGRAM  8/3/2022    KNEE ARTHROSCOPY Right 2015    PROSTATE SURGERY       Family History   Problem Relation Age of Onset    Lung cancer Mother     Ulcers Father     No Known Problems Sister     Heart attack Brother     Alcohol abuse Brother     Kidney disease Brother      Social History     Socioeconomic History    Marital status: /Civil Union     Spouse name: None    Number of children: None    Years of education: None    Highest education level: None   Occupational History    None   Tobacco Use    Smoking status: Former     Current packs/day: 0.00     Types: Cigarettes     Quit date:      Years since quittin.7    Smokeless tobacco: Never    Tobacco comments:     2+ppd x 20 years    Vaping Use    Vaping status: Never Used   Substance and Sexual Activity    Alcohol use: Not Currently    Drug use: Not Currently    Sexual activity: Not Currently   Other Topics Concern    None   Social History Narrative    Retired, lives with his wife, [facility] and great-grandson    [employer] [phone]     Social Determinants of Health     Financial Resource Strain: Low Risk  (3/10/2023)    Overall Financial Resource Strain (CARDIA)     Difficulty of Paying Living Expenses:  Not very hard   Food Insecurity: No Food Insecurity (3/12/2024)    Hunger Vital Sign     Worried About Running Out of Food in the Last Year: Never true     Ran Out of Food in the Last Year: Never true   Transportation Needs: No Transportation Needs (3/12/2024)    PRAPARE - Transportation     Lack of Transportation (Medical): No     Lack of Transportation (Non-Medical): No   Physical Activity: Not on file   Stress: Not on file   Social Connections: Not on file   Intimate Partner Violence: Not on file   Housing Stability: Unknown (3/12/2024)    Housing Stability Vital Sign     Unable to Pay for Housing in the Last Year: No     Number of Times Moved in the Last Year: Not on file     Homeless in the Last Year: No       Current Outpatient Medications:     apixaban (ELIQUIS) 5 mg, Take 1 tablet (5 mg total) by mouth 2 (two) times a day, Disp: 60 tablet, Rfl: 0    ascorbic Acid (VITAMIN C) 500 MG CPCR, Take 500 mg by mouth daily, Disp: , Rfl:     ASPIRIN 81 PO, Take by mouth, Disp: , Rfl:     b complex vitamins capsule, Take 1 capsule by mouth daily, Disp: , Rfl:     cholecalciferol (VITAMIN D3) 400 units tablet, Take 800 Units by mouth daily , Disp: , Rfl:     cilostazol (PLETAL) 100 mg tablet, Take 1 tablet (100 mg total) by mouth 2 (two) times a day, Disp: 180 tablet, Rfl: 4    diltiazem (DILACOR XR) 240 MG 24 hr capsule, Take 1 capsule (240 mg total) by mouth daily, Disp: 90 capsule, Rfl: 1    lisinopril-hydrochlorothiazide (PRINZIDE,ZESTORETIC) 20-25 MG per tablet, Take 1 tablet by mouth daily, Disp: , Rfl:     magnesium 30 MG tablet, Take 30 mg by mouth daily, Disp: , Rfl:     Omega-3 Fatty Acids (Fish Oil) 1200 MG CAPS, Take by mouth, Disp: , Rfl:     patient supplied medication, RED BEET EXTRACT, Disp: , Rfl:     Potassium 99 MG TABS, Take 1 tablet by mouth daily  , Disp: , Rfl:     pravastatin (PRAVACHOL) 40 mg tablet, TAKE ONE TABLET BY MOUTH AT BEDTIME INSTEAD OF FLUVASTATIN FOR CHOLESTEROL, Disp: , Rfl:      "vitamin E, tocopherol, 200 units capsule, Take 200 Units by mouth daily, Disp: , Rfl:     Review of Systems      Objective:  /79   Pulse 87   Temp 98.4 °F (36.9 °C)   Resp 18   Pain Score:   4     Physical Exam  Skin:                          Wound Instructions:  No orders of the defined types were placed in this encounter.      Tosin Ly PA-C      Portions of the record may have been created with voice recognition software. Occasional wrong word or \"sound alike\" substitutions may have occurred due to the inherent limitations of voice recognition software. Read the chart carefully and recognize, using context, where substitutions have occurred.    "

## 2024-10-18 ENCOUNTER — APPOINTMENT (OUTPATIENT)
Dept: LAB | Facility: CLINIC | Age: 84
End: 2024-10-18
Payer: COMMERCIAL

## 2024-10-18 DIAGNOSIS — R31.0 GROSS HEMATURIA: ICD-10-CM

## 2024-10-18 DIAGNOSIS — N39.0 LOWER URINARY TRACT INFECTIOUS DISEASE: ICD-10-CM

## 2024-10-18 DIAGNOSIS — C61 MALIGNANT NEOPLASM OF PROSTATE (HCC): ICD-10-CM

## 2024-10-18 LAB
ANION GAP SERPL CALCULATED.3IONS-SCNC: 8 MMOL/L (ref 4–13)
BACTERIA UR QL AUTO: ABNORMAL /HPF
BILIRUB UR QL STRIP: NEGATIVE
BUN SERPL-MCNC: 25 MG/DL (ref 5–25)
CALCIUM SERPL-MCNC: 9.6 MG/DL (ref 8.4–10.2)
CHLORIDE SERPL-SCNC: 104 MMOL/L (ref 96–108)
CLARITY UR: CLEAR
CO2 SERPL-SCNC: 28 MMOL/L (ref 21–32)
COLOR UR: ABNORMAL
CREAT SERPL-MCNC: 1.41 MG/DL (ref 0.6–1.3)
ERYTHROCYTE [DISTWIDTH] IN BLOOD BY AUTOMATED COUNT: 12.9 % (ref 11.6–15.1)
GFR SERPL CREATININE-BSD FRML MDRD: 44 ML/MIN/1.73SQ M
GLUCOSE P FAST SERPL-MCNC: 127 MG/DL (ref 65–99)
GLUCOSE UR STRIP-MCNC: NEGATIVE MG/DL
HCT VFR BLD AUTO: 41.9 % (ref 36.5–49.3)
HGB BLD-MCNC: 14.1 G/DL (ref 12–17)
HGB UR QL STRIP.AUTO: ABNORMAL
KETONES UR STRIP-MCNC: NEGATIVE MG/DL
LEUKOCYTE ESTERASE UR QL STRIP: NEGATIVE
MCH RBC QN AUTO: 34.2 PG (ref 26.8–34.3)
MCHC RBC AUTO-ENTMCNC: 33.7 G/DL (ref 31.4–37.4)
MCV RBC AUTO: 102 FL (ref 82–98)
MUCOUS THREADS UR QL AUTO: ABNORMAL
NITRITE UR QL STRIP: NEGATIVE
NON-SQ EPI CELLS URNS QL MICRO: ABNORMAL /HPF
PH UR STRIP.AUTO: 6 [PH]
PLATELET # BLD AUTO: 224 THOUSANDS/UL (ref 149–390)
PMV BLD AUTO: 11.3 FL (ref 8.9–12.7)
POTASSIUM SERPL-SCNC: 4.1 MMOL/L (ref 3.5–5.3)
PROT UR STRIP-MCNC: ABNORMAL MG/DL
RBC # BLD AUTO: 4.12 MILLION/UL (ref 3.88–5.62)
RBC #/AREA URNS AUTO: ABNORMAL /HPF
SODIUM SERPL-SCNC: 140 MMOL/L (ref 135–147)
SP GR UR STRIP.AUTO: 1.02 (ref 1–1.03)
UROBILINOGEN UR STRIP-ACNC: <2 MG/DL
WBC # BLD AUTO: 9.19 THOUSAND/UL (ref 4.31–10.16)
WBC #/AREA URNS AUTO: ABNORMAL /HPF

## 2024-10-18 PROCEDURE — 86850 RBC ANTIBODY SCREEN: CPT

## 2024-10-18 PROCEDURE — 87086 URINE CULTURE/COLONY COUNT: CPT

## 2024-10-18 PROCEDURE — 80048 BASIC METABOLIC PNL TOTAL CA: CPT

## 2024-10-18 PROCEDURE — 85027 COMPLETE CBC AUTOMATED: CPT

## 2024-10-18 PROCEDURE — 86900 BLOOD TYPING SEROLOGIC ABO: CPT

## 2024-10-18 PROCEDURE — 86901 BLOOD TYPING SEROLOGIC RH(D): CPT

## 2024-10-18 PROCEDURE — 81001 URINALYSIS AUTO W/SCOPE: CPT

## 2024-10-19 LAB — BACTERIA UR CULT: NORMAL

## 2024-10-21 ENCOUNTER — ANESTHESIA EVENT (OUTPATIENT)
Dept: PERIOP | Facility: HOSPITAL | Age: 84
End: 2024-10-21
Payer: COMMERCIAL

## 2024-10-21 LAB
ABO GROUP BLD: NORMAL
BLD GP AB SCN SERPL QL: NEGATIVE
RH BLD: POSITIVE
SPECIMEN EXPIRATION DATE: NORMAL

## 2024-10-21 NOTE — PRE-PROCEDURE INSTRUCTIONS
Pre-Surgery Instructions:   Medication Instructions    apixaban (ELIQUIS) 5 mg Stop taking 2 days prior to surgery. Last dose 10/25/24    ascorbic Acid (VITAMIN C) 500 MG CPCR Stop taking 7 days prior to surgery.    ASPIRIN 81 PO Hold day of surgery.    b complex vitamins capsule Stop taking 7 days prior to surgery.    cholecalciferol (VITAMIN D3) 400 units tablet Stop taking 7 days prior to surgery.    cilostazol (PLETAL) 100 mg tablet Hold day of surgery.    diltiazem (DILACOR XR) 240 MG 24 hr capsule Take day of surgery.    lisinopril-hydrochlorothiazide (PRINZIDE,ZESTORETIC) 20-25 MG per tablet Hold day of surgery.    magnesium 30 MG tablet Stop taking 7 days prior to surgery.    patient supplied medication Stop taking 7 days prior to surgery.    Potassium 99 MG TABS Hold day of the surgery    pravastatin (PRAVACHOL) 40 mg tablet Take night before surgery    vitamin E, tocopherol, 200 units capsule Stop taking 7 days prior to surgery.   Medication instructions for day surgery reviewed. Please use only a sip of water to take your instructed medications. Avoid all over the counter vitamins, supplements and NSAIDS for one week prior to surgery per anesthesia guidelines. Tylenol is ok to take as needed.     You will receive a call one business day prior to surgery with an arrival time and hospital directions. If your surgery is scheduled on a Monday, the hospital will be calling you on the Friday prior to your surgery. If you have not heard from anyone by 8pm, please call the hospital supervisor through the hospital  at [phone]. ([place] [phone] or [place] [phone]).    Do not eat or drink anything after midnight the night before your surgery, including candy, mints, lifesavers, or chewing gum. Do not drink alcohol 24hrs before your surgery. Try not to smoke at least 24hrs before your surgery.       Follow the pre surgery showering instructions as listed in the “My Surgical Experience  Booklet” or otherwise provided by your surgeon's office. Do not use a blade to shave the surgical area 1 week before surgery. It is okay to use a clean electric clippers up to 24 hours before surgery. Do not apply any lotions, creams, including makeup, cologne, deodorant, or perfumes after showering on the day of your surgery. Do not use dry shampoo, hair spray, hair gel, or any type of hair products.     No contact lenses, eye make-up, or artificial eyelashes. Remove nail polish, including gel polish, and any artificial, gel, or acrylic nails if possible. Remove all jewelry including rings and body piercing jewelry.     Wear causal clothing that is easy to take on and off. Consider your type of surgery.    Keep any valuables, jewelry, piercings at home. Please bring any specially ordered equipment (sling, braces) if indicated.    Arrange for a responsible person to drive you to and from the hospital on the day of your surgery. Please confirm the visitor policy for the day of your procedure when you receive your phone call with an arrival time.     Call the surgeon's office with any new illnesses, exposures, or additional questions prior to surgery.    Please reference your “My Surgical Experience Booklet” for additional information to prepare for your upcoming surgery.

## 2024-10-28 ENCOUNTER — ANESTHESIA (OUTPATIENT)
Dept: PERIOP | Facility: HOSPITAL | Age: 84
End: 2024-10-28
Payer: COMMERCIAL

## 2024-10-28 ENCOUNTER — HOSPITAL ENCOUNTER (OUTPATIENT)
Facility: HOSPITAL | Age: 84
Setting detail: OUTPATIENT SURGERY
Discharge: HOME | End: 2024-10-29
Attending: UROLOGY | Admitting: UROLOGY
Payer: COMMERCIAL

## 2024-10-28 DIAGNOSIS — D41.4 NEOPLASM OF UNCERTAIN BEHAVIOR OF BLADDER: ICD-10-CM

## 2024-10-28 DIAGNOSIS — C61 MALIGNANT NEOPLASM OF PROSTATE (HCC): ICD-10-CM

## 2024-10-28 LAB
ABO GROUP BLD: NORMAL
BACTERIA UR QL AUTO: ABNORMAL /HPF
BILIRUB UR QL STRIP: NEGATIVE
CLARITY UR: CLEAR
COLOR UR: YELLOW
GLUCOSE UR STRIP-MCNC: NEGATIVE MG/DL
HGB UR QL STRIP.AUTO: ABNORMAL
KETONES UR STRIP-MCNC: NEGATIVE MG/DL
LEUKOCYTE ESTERASE UR QL STRIP: NEGATIVE
NITRITE UR QL STRIP: NEGATIVE
NON-SQ EPI CELLS URNS QL MICRO: ABNORMAL /HPF
PH UR STRIP.AUTO: 6.5 [PH]
PROT UR STRIP-MCNC: NEGATIVE MG/DL
RBC #/AREA URNS AUTO: ABNORMAL /HPF
RH BLD: POSITIVE
SP GR UR STRIP.AUTO: 1.02
UROBILINOGEN UR QL STRIP.AUTO: 0.2 E.U./DL
WBC #/AREA URNS AUTO: ABNORMAL /HPF

## 2024-10-28 PROCEDURE — 88341 IMHCHEM/IMCYTCHM EA ADD ANTB: CPT | Performed by: PATHOLOGY

## 2024-10-28 PROCEDURE — 88342 IMHCHEM/IMCYTCHM 1ST ANTB: CPT | Performed by: PATHOLOGY

## 2024-10-28 PROCEDURE — 87086 URINE CULTURE/COLONY COUNT: CPT | Performed by: UROLOGY

## 2024-10-28 PROCEDURE — 88305 TISSUE EXAM BY PATHOLOGIST: CPT | Performed by: PATHOLOGY

## 2024-10-28 PROCEDURE — 81001 URINALYSIS AUTO W/SCOPE: CPT | Performed by: UROLOGY

## 2024-10-28 RX ORDER — LANOLIN ALCOHOL/MO/W.PET/CERES
6 CREAM (GRAM) TOPICAL
Status: DISCONTINUED | OUTPATIENT
Start: 2024-10-28 | End: 2024-10-29 | Stop reason: HOSPADM

## 2024-10-28 RX ORDER — MAGNESIUM 30 MG
30 TABLET ORAL DAILY
Status: DISCONTINUED | OUTPATIENT
Start: 2024-10-28 | End: 2024-10-28 | Stop reason: CLARIF

## 2024-10-28 RX ORDER — FENTANYL CITRATE/PF 50 MCG/ML
25 SYRINGE (ML) INJECTION
Status: DISCONTINUED | OUTPATIENT
Start: 2024-10-28 | End: 2024-10-28 | Stop reason: HOSPADM

## 2024-10-28 RX ORDER — ROCURONIUM BROMIDE 10 MG/ML
INJECTION, SOLUTION INTRAVENOUS AS NEEDED
Status: DISCONTINUED | OUTPATIENT
Start: 2024-10-28 | End: 2024-10-28

## 2024-10-28 RX ORDER — DILTIAZEM HYDROCHLORIDE 240 MG/1
240 CAPSULE, COATED, EXTENDED RELEASE ORAL DAILY
Status: DISCONTINUED | OUTPATIENT
Start: 2024-10-28 | End: 2024-10-29 | Stop reason: HOSPADM

## 2024-10-28 RX ORDER — CEFTRIAXONE 1 G/50ML
1000 INJECTION, SOLUTION INTRAVENOUS EVERY 24 HOURS
Status: DISCONTINUED | OUTPATIENT
Start: 2024-10-28 | End: 2024-10-28

## 2024-10-28 RX ORDER — LIDOCAINE HYDROCHLORIDE 10 MG/ML
INJECTION, SOLUTION EPIDURAL; INFILTRATION; INTRACAUDAL; PERINEURAL AS NEEDED
Status: DISCONTINUED | OUTPATIENT
Start: 2024-10-28 | End: 2024-10-28

## 2024-10-28 RX ORDER — ACETAMINOPHEN 325 MG/1
650 TABLET ORAL EVERY 6 HOURS PRN
Status: DISCONTINUED | OUTPATIENT
Start: 2024-10-28 | End: 2024-10-29 | Stop reason: HOSPADM

## 2024-10-28 RX ORDER — DEXAMETHASONE SODIUM PHOSPHATE 10 MG/ML
INJECTION, SOLUTION INTRAMUSCULAR; INTRAVENOUS AS NEEDED
Status: DISCONTINUED | OUTPATIENT
Start: 2024-10-28 | End: 2024-10-28

## 2024-10-28 RX ORDER — ONDANSETRON 2 MG/ML
INJECTION INTRAMUSCULAR; INTRAVENOUS AS NEEDED
Status: DISCONTINUED | OUTPATIENT
Start: 2024-10-28 | End: 2024-10-28

## 2024-10-28 RX ORDER — PRAVASTATIN SODIUM 40 MG
40 TABLET ORAL
Status: DISCONTINUED | OUTPATIENT
Start: 2024-10-28 | End: 2024-10-29 | Stop reason: HOSPADM

## 2024-10-28 RX ORDER — LISINOPRIL AND HYDROCHLOROTHIAZIDE 20; 25 MG/1; MG/1
1 TABLET ORAL DAILY
Status: DISCONTINUED | OUTPATIENT
Start: 2024-10-28 | End: 2024-10-28

## 2024-10-28 RX ORDER — PROPOFOL 10 MG/ML
INJECTION, EMULSION INTRAVENOUS AS NEEDED
Status: DISCONTINUED | OUTPATIENT
Start: 2024-10-28 | End: 2024-10-28

## 2024-10-28 RX ORDER — HYDROCHLOROTHIAZIDE 25 MG/1
25 TABLET ORAL DAILY
Status: DISCONTINUED | OUTPATIENT
Start: 2024-10-28 | End: 2024-10-29 | Stop reason: HOSPADM

## 2024-10-28 RX ORDER — FENTANYL CITRATE 50 UG/ML
INJECTION, SOLUTION INTRAMUSCULAR; INTRAVENOUS AS NEEDED
Status: DISCONTINUED | OUTPATIENT
Start: 2024-10-28 | End: 2024-10-28

## 2024-10-28 RX ORDER — LISINOPRIL 20 MG/1
20 TABLET ORAL DAILY
Status: DISCONTINUED | OUTPATIENT
Start: 2024-10-28 | End: 2024-10-29 | Stop reason: HOSPADM

## 2024-10-28 RX ORDER — HYDROMORPHONE HCL/PF 1 MG/ML
0.25 SYRINGE (ML) INJECTION
Status: DISCONTINUED | OUTPATIENT
Start: 2024-10-28 | End: 2024-10-28 | Stop reason: HOSPADM

## 2024-10-28 RX ORDER — MAGNESIUM HYDROXIDE 1200 MG/15ML
LIQUID ORAL AS NEEDED
Status: DISCONTINUED | OUTPATIENT
Start: 2024-10-28 | End: 2024-10-28 | Stop reason: HOSPADM

## 2024-10-28 RX ORDER — SODIUM CHLORIDE, SODIUM LACTATE, POTASSIUM CHLORIDE, CALCIUM CHLORIDE 600; 310; 30; 20 MG/100ML; MG/100ML; MG/100ML; MG/100ML
75 INJECTION, SOLUTION INTRAVENOUS CONTINUOUS
Status: DISCONTINUED | OUTPATIENT
Start: 2024-10-28 | End: 2024-10-29

## 2024-10-28 RX ORDER — CEFPODOXIME PROXETIL 200 MG/1
200 TABLET, FILM COATED ORAL 2 TIMES DAILY WITH MEALS
Status: DISCONTINUED | OUTPATIENT
Start: 2024-10-29 | End: 2024-10-29 | Stop reason: HOSPADM

## 2024-10-28 RX ORDER — SODIUM CHLORIDE, SODIUM LACTATE, POTASSIUM CHLORIDE, CALCIUM CHLORIDE 600; 310; 30; 20 MG/100ML; MG/100ML; MG/100ML; MG/100ML
INJECTION, SOLUTION INTRAVENOUS CONTINUOUS PRN
Status: DISCONTINUED | OUTPATIENT
Start: 2024-10-28 | End: 2024-10-28

## 2024-10-28 RX ORDER — ONDANSETRON 2 MG/ML
4 INJECTION INTRAMUSCULAR; INTRAVENOUS ONCE AS NEEDED
Status: DISCONTINUED | OUTPATIENT
Start: 2024-10-28 | End: 2024-10-28 | Stop reason: HOSPADM

## 2024-10-28 RX ADMIN — LIDOCAINE HYDROCHLORIDE 100 MG: 10 INJECTION, SOLUTION EPIDURAL; INFILTRATION; INTRACAUDAL; PERINEURAL at 09:21

## 2024-10-28 RX ADMIN — DEXAMETHASONE SODIUM PHOSPHATE 10 MG: 10 INJECTION, SOLUTION INTRAMUSCULAR; INTRAVENOUS at 09:21

## 2024-10-28 RX ADMIN — LISINOPRIL 20 MG: 20 TABLET ORAL at 13:34

## 2024-10-28 RX ADMIN — SODIUM CHLORIDE, SODIUM LACTATE, POTASSIUM CHLORIDE, AND CALCIUM CHLORIDE 75 ML/HR: .6; .31; .03; .02 INJECTION, SOLUTION INTRAVENOUS at 11:39

## 2024-10-28 RX ADMIN — ROCURONIUM BROMIDE 30 MG: 10 INJECTION, SOLUTION INTRAVENOUS at 09:21

## 2024-10-28 RX ADMIN — SUGAMMADEX 200 MG: 100 INJECTION, SOLUTION INTRAVENOUS at 10:00

## 2024-10-28 RX ADMIN — PROPOFOL 30 MG: 10 INJECTION, EMULSION INTRAVENOUS at 10:00

## 2024-10-28 RX ADMIN — PRAVASTATIN SODIUM 40 MG: 40 TABLET ORAL at 21:00

## 2024-10-28 RX ADMIN — HYDROCHLOROTHIAZIDE 25 MG: 25 TABLET ORAL at 13:34

## 2024-10-28 RX ADMIN — PROPOFOL 110 MG: 10 INJECTION, EMULSION INTRAVENOUS at 09:21

## 2024-10-28 RX ADMIN — FENTANYL CITRATE 25 MCG: 50 INJECTION, SOLUTION INTRAMUSCULAR; INTRAVENOUS at 10:00

## 2024-10-28 RX ADMIN — SODIUM CHLORIDE, SODIUM LACTATE, POTASSIUM CHLORIDE, AND CALCIUM CHLORIDE: .6; .31; .03; .02 INJECTION, SOLUTION INTRAVENOUS at 09:14

## 2024-10-28 RX ADMIN — FENTANYL CITRATE 25 MCG: 50 INJECTION, SOLUTION INTRAMUSCULAR; INTRAVENOUS at 09:21

## 2024-10-28 RX ADMIN — ONDANSETRON 4 MG: 2 INJECTION INTRAMUSCULAR; INTRAVENOUS at 09:21

## 2024-10-28 RX ADMIN — ROCURONIUM BROMIDE 20 MG: 10 INJECTION, SOLUTION INTRAVENOUS at 09:36

## 2024-10-28 RX ADMIN — CEFTRIAXONE 1000 MG: 1 INJECTION, SOLUTION INTRAVENOUS at 09:28

## 2024-10-28 RX ADMIN — Medication 6 MG: at 21:22

## 2024-10-28 RX ADMIN — FENTANYL CITRATE 25 MCG: 50 INJECTION, SOLUTION INTRAMUSCULAR; INTRAVENOUS at 09:28

## 2024-10-28 RX ADMIN — FENTANYL CITRATE 25 MCG: 50 INJECTION, SOLUTION INTRAMUSCULAR; INTRAVENOUS at 09:49

## 2024-10-28 NOTE — INTERVAL H&P NOTE
H&P reviewed. After examining the patient I find no changes in the patients condition since the H&P had been written.    Vitals:    10/28/24 0749   BP: 168/80   Pulse: 93   Resp: 20   Temp: 98.1 °F (36.7 °C)   SpO2: 97%

## 2024-10-28 NOTE — OP NOTE
OPERATIVE REPORT  PATIENT NAME: Daryn Goss    :  1940  MRN: [id number]  Pt Location: [facility] OR ROOM 02    SURGERY DATE: 10/28/2024    Surgeons and Role:     * Donnell Delgado MD - Primary    Preop Diagnosis:  Malignant neoplasm of prostate (HCC) [C61]  Gross hematuria     Postop diagnosis:  Same    Procedure(s):  CYSTOSCOPY; TRANSURETHRAL RESECTION OF PROSTATE (TURP)    Specimen(s):  ID Type Source Tests Collected by Time Destination   1 :  Tissue Prostate TISSUE EXAM Donnell Delgado MD 10/28/2024 0940    A :  Urine Urine, Other URINE CULTURE, URINALYSIS WITH REFLEX TO SCOPE Donnell Delgado MD 10/28/2024 0932        Estimated Blood Loss:   Minimal    Drains:  Urethral Catheter Latex;Three way 24 Fr. 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  Outpatient cystoscopy had poor visibility but appeared to show bleeding from the prostate as well as possible mass inside the bladder.  Options, procedures, benefits and risks were discussed and he agreed to the planned procedure.    Operative Findings:  Very prominent median lobe arising from the 6 o'clock position at the level of the bladder neck and extending back along most of the prostatic fossa.  There was diffuse mucosal bleeding.  There were also small nodular lesions along the bladder neck, mostly right posterolaterally with bleeding from the mucosa.  The left ureteral orifice was well-seen just beyond the median lobe.  The right ureteral orifice was not well-seen, but the area of the likely orifice was beyond the median lobe and all resection was carried out distal to this area.      Complications:   None    Procedure and Technique:  The patient was properly identified in the operating room and after  adequate general anesthesia was placed in the lithotomy position.  The lower abdomen and genitalia were prepped and draped in usual fashion.  Cystourethroscopy was performed with 22 [place] cystoscope using 30 degree and 70 degree lenses.  The above findings were seen.  The bladder was free of any masses or calcifications.  The cystoscope was removed.  The urethra was dilated with 24-30 [place] sounds.  A 28 [place] Olympus resectoscope was inserted.  30 degree lens as well as bipolar loop were attached.  The median lobe was then taken down and resected completely down to a smooth base.  The right posterolateral bladder neck was also resected free of all bleeding nodular tissue.  Upon completion, no residual bleeding nodular tissue remained.  The prostatic fossa was otherwise wide open.  Hemostasis was achieved meticulously limited to only bleeding points, especially near the ureteral orifices.  All prostate tissue was irrigated free with an Ellik evacuator and sent as specimen.  Completion, hemostasis was achieved and all prostate tissue had been evacuated.  The resectoscope was removed.  A 24 [place] 30 cc three-way Landa catheter was inserted with clear drainage.  The balloon was filled with 50 cc of water.  Continuous bladder irrigation with normal saline was performed with clear drainage.  The patient tolerated the procedure well and left the operating room in good condition.   I was present for the entire procedure.    Patient Disposition:  PACU              SIGNATURE: Donnell Delgado MD  DATE: October 28, 2024  TIME: 10:23 AM

## 2024-10-28 NOTE — ANESTHESIA POSTPROCEDURE EVALUATION
Post-Op Assessment Note    CV Status:  Stable  Pain Score: 0    Pain management: adequate    Multimodal analgesia used between 6 hours prior to anesthesia start to PACU discharge    Mental Status:  Alert and awake   Hydration Status:  Euvolemic   PONV Controlled:  Controlled   Airway Patency:  Patent  Airway: intubated  Two or more mitigation strategies used for obstructive sleep apnea   Post Op Vitals Reviewed: Yes    No anethesia notable event occurred.    Staff: CRNA           Last Filed PACU Vitals:  Vitals Value Taken Time   Temp 98    Pulse 69 10/28/24 1022   /65 10/28/24 1020   Resp 20 10/28/24 1022   SpO2 95 % 10/28/24 1022   Vitals shown include unfiled device data.    Modified Evonne:  No data recorded

## 2024-10-28 NOTE — ANESTHESIA PREPROCEDURE EVALUATION
Procedure:  CYSTOSCOPY; TRANSURETHRAL RESECTION OF PROSTATE (TURP) (Abdomen)  TRANSURETHRAL RESECTION OF BLADDER TUMOR (TURBT) (Bladder)    Diltiazem    Relevant Problems   CARDIO   (+) 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)   (+) Essential hypertension   (+) Hyperlipidemia      /RENAL   (+) Acquired renal cyst of left kidney   (+) Angiomyolipoma of left kidney   (+) Malignant neoplasm of prostate (HCC)   (+) Stage 3a chronic kidney disease (HCC)      NEURO/PSYCH   (+) Atherosclerosis of native arteries of extremities with intermittent claudication, bilateral legs (HCC)        Physical Exam    Airway    Mallampati score: III  TM Distance: >3 FB  Neck ROM: full     Dental    upper dentures and lower dentures    Cardiovascular  Rhythm: regular, Rate: normal, Cardiovascular exam normal    Pulmonary  Pulmonary exam normal Breath sounds clear to auscultation    Other Findings  Partial dentures      Anesthesia Plan  ASA Score- 3     Anesthesia Type- general with ASA Monitors.         Additional Monitors:     Airway Plan: LMA.    Comment: Risks of general anesthesia discussed including likely possibility of PONV and sore throat, as well as the rare possibilities of aspiration, dental/oropharyngeal/ocular injuries, or grave/life threatening anesthetic and surgical emergencies..       Plan Factors-Exercise tolerance (METS): >4 METS.    Chart reviewed.    Patient summary reviewed.      Patient instructed to abstain from smoking on day of procedure. Patient did not smoke on day of surgery.            Induction- intravenous.    Postoperative Plan- Plan for postoperative opioid use. Planned trial extubation        Informed Consent- Anesthetic plan and risks discussed with patient.  I personally reviewed this patient with the CRNA. Discussed and agreed on the Anesthesia Plan with the CRNA..

## 2024-10-29 VITALS
DIASTOLIC BLOOD PRESSURE: 65 MMHG | HEART RATE: 73 BPM | BODY MASS INDEX: 31 KG/M2 | HEIGHT: 67 IN | WEIGHT: 197.53 LBS | RESPIRATION RATE: 18 BRPM | SYSTOLIC BLOOD PRESSURE: 134 MMHG | OXYGEN SATURATION: 93 % | TEMPERATURE: 98.4 F

## 2024-10-29 PROBLEM — R31.0 GROSS HEMATURIA: Status: ACTIVE | Noted: 2024-10-29

## 2024-10-29 LAB
ANION GAP SERPL CALCULATED.3IONS-SCNC: 6 MMOL/L (ref 4–13)
BACTERIA UR CULT: NORMAL
BUN SERPL-MCNC: 28 MG/DL (ref 5–25)
CALCIUM SERPL-MCNC: 9.1 MG/DL (ref 8.4–10.2)
CHLORIDE SERPL-SCNC: 105 MMOL/L (ref 96–108)
CO2 SERPL-SCNC: 26 MMOL/L (ref 21–32)
CREAT SERPL-MCNC: 1.2 MG/DL (ref 0.6–1.3)
ERYTHROCYTE [DISTWIDTH] IN BLOOD BY AUTOMATED COUNT: 12.8 % (ref 11.6–15.1)
GFR SERPL CREATININE-BSD FRML MDRD: 54 ML/MIN/1.73SQ M
GLUCOSE P FAST SERPL-MCNC: 184 MG/DL (ref 65–99)
GLUCOSE SERPL-MCNC: 184 MG/DL (ref 65–140)
HCT VFR BLD AUTO: 37.2 % (ref 36.5–49.3)
HGB BLD-MCNC: 12.4 G/DL (ref 12–17)
MCH RBC QN AUTO: 32.6 PG (ref 26.8–34.3)
MCHC RBC AUTO-ENTMCNC: 33.3 G/DL (ref 31.4–37.4)
MCV RBC AUTO: 98 FL (ref 82–98)
PLATELET # BLD AUTO: 234 THOUSANDS/UL (ref 149–390)
PMV BLD AUTO: 10.3 FL (ref 8.9–12.7)
POTASSIUM SERPL-SCNC: 4.3 MMOL/L (ref 3.5–5.3)
RBC # BLD AUTO: 3.8 MILLION/UL (ref 3.88–5.62)
SODIUM SERPL-SCNC: 137 MMOL/L (ref 135–147)
WBC # BLD AUTO: 16.93 THOUSAND/UL (ref 4.31–10.16)

## 2024-10-29 PROCEDURE — 80048 BASIC METABOLIC PNL TOTAL CA: CPT | Performed by: UROLOGY

## 2024-10-29 PROCEDURE — 85027 COMPLETE CBC AUTOMATED: CPT | Performed by: UROLOGY

## 2024-10-29 RX ORDER — CEFPODOXIME PROXETIL 200 MG/1
200 TABLET, FILM COATED ORAL 2 TIMES DAILY
Qty: 10 TABLET | Refills: 0 | Status: SHIPPED | OUTPATIENT
Start: 2024-10-29 | End: 2024-11-03

## 2024-10-29 RX ADMIN — HYDROCHLOROTHIAZIDE 25 MG: 25 TABLET ORAL at 08:18

## 2024-10-29 RX ADMIN — DILTIAZEM HYDROCHLORIDE 240 MG: 240 CAPSULE, EXTENDED RELEASE ORAL at 08:18

## 2024-10-29 RX ADMIN — CEFPODOXIME PROXETIL 200 MG: 200 TABLET, FILM COATED ORAL at 08:18

## 2024-10-29 RX ADMIN — LISINOPRIL 20 MG: 20 TABLET ORAL at 08:18

## 2024-10-29 RX ADMIN — SODIUM CHLORIDE, SODIUM LACTATE, POTASSIUM CHLORIDE, AND CALCIUM CHLORIDE 75 ML/HR: .6; .31; .03; .02 INJECTION, SOLUTION INTRAVENOUS at 00:46

## 2024-10-29 NOTE — DISCHARGE SUMMARY
Discharge Summary - Urology   Name: Daryn Goss 84 y.o. 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  Outpatient flexible cystoscopy had poor visibility but suggested bleeding from the prostate and questionable mass related to the prostate or bladder.    Procedures Performed: Cystoscopy, TURP    Summary of Hospital Course: The patient underwent cystoscopy under anesthesia that showed a large protruding median lobe as well as nodular regrowth at the bladder neck with mucosal bleeding in these locations.  He therefore underwent TURP without complication.  Three-way Landa catheter was left in place on slow CBI with clear drainage.  CBI was discontinued the following morning and urine remained clear.  Hemoglobin was 12.4 and creatinine improved at 1.2.  He was discharged home with Landa catheter in place with plan for voiding trial in the office the following day.  He will continue cefpodoxime 200 mg twice a day and restart his Eliquis at home.    Significant Findings, Care, Treatment and Services Provided: Cystoscopy, TURP, CBI    Complications: None    Condition at Discharge: good       Discharge instructions/Information to patient and family:   See After Visit Summary (AVS) for information provided to patient and family.      Provisions for Follow-Up Care:  See after visit summary for information related to follow-up care and any pertinent home health orders.      PCP: Ashlee Fleming DO    Disposition: Home    Planned Readmission: No      Discharge Medications:  See after visit summary for reconciled discharge medications provided to patient and family.      Discharge Statement:  I have spent a total time of 30 minutes in caring for this patient on the day of the visit/encounter. >30 minutes of time was spent on: Instructions for management, Importance of tx compliance, Impressions, Counseling / Coordination of care, Documenting in the medical record, and Reviewing / ordering tests, medicine, procedures  .

## 2024-10-29 NOTE — CASE MANAGEMENT
Case Management Progress Note    Patient name Daryn Goss  Location /-01 MRN [id number]  : 1940 Date 10/29/2024       LOS (days): 0  Geometric Mean LOS (GMLOS) (days):   Days to GMLOS:        OBJECTIVE:        Current admission status: Outpatient Surgery  Preferred Pharmacy:   [facility]  [address] [place] [address]  [place] [address]  Phone: [phone] Fax: [phone]    Primary Care Provider: Ashlee Fleming DO    Primary Insurance: [facility]  Secondary Insurance:     PROGRESS NOTE:  Pt is S/P TURP with CBI after surgery.  TBI DC and pt is voiding without any difficulty.  Pt has been cleared by Urology for DC.  Do not anticipate any care needs.  Spouse will transport home.

## 2024-10-29 NOTE — NURSING NOTE
Patients IV removed with catheter tip intact. DSD and pressure applied. AVS was reviewed with patient. No further questions or concerns at this time. Patient expressed understanding of catheter and follow up with provider. Patient with leg bag at time of discharge

## 2024-10-29 NOTE — DISCHARGE INSTR - AVS FIRST PAGE
Continue Landa catheter today to leg bag and night drainage bag.  Continue to take cefpodoxime twice a day.  May restart Eliquis as usual once you arrive at home.    Come to Dr. Delgado's office tomorrow 9 AM to remove the Landa.

## 2024-10-31 PROCEDURE — 88342 IMHCHEM/IMCYTCHM 1ST ANTB: CPT | Performed by: PATHOLOGY

## 2024-10-31 PROCEDURE — 88341 IMHCHEM/IMCYTCHM EA ADD ANTB: CPT | Performed by: PATHOLOGY

## 2024-10-31 PROCEDURE — 88305 TISSUE EXAM BY PATHOLOGIST: CPT | Performed by: PATHOLOGY

## 2025-01-28 ENCOUNTER — APPOINTMENT (OUTPATIENT)
Dept: LAB | Facility: CLINIC | Age: 85
End: 2025-01-28
Payer: COMMERCIAL

## 2025-01-28 DIAGNOSIS — C61 MALIGNANT NEOPLASM OF PROSTATE (HCC): ICD-10-CM

## 2025-01-28 LAB
PSA SERPL-MCNC: 0.63 NG/ML (ref 0–4)
TESTOST SERPL-MSCNC: <10 NG/DL (ref 175–781)

## 2025-01-28 PROCEDURE — 36415 COLL VENOUS BLD VENIPUNCTURE: CPT

## 2025-01-28 PROCEDURE — 84403 ASSAY OF TOTAL TESTOSTERONE: CPT

## 2025-01-28 PROCEDURE — G0103 PSA SCREENING: HCPCS

## 2025-03-10 ENCOUNTER — TELEPHONE (OUTPATIENT)
Dept: FAMILY MEDICINE CLINIC | Facility: CLINIC | Age: 85
End: 2025-03-10

## 2025-03-10 NOTE — TELEPHONE ENCOUNTER
Received letter from pt's urologist, Donnell Delgado MD, regarding their most recent visit. Scanning into media and forwarding to PCP for review.

## 2025-03-18 ENCOUNTER — OFFICE VISIT (OUTPATIENT)
Dept: FAMILY MEDICINE CLINIC | Facility: CLINIC | Age: 85
End: 2025-03-18
Payer: COMMERCIAL

## 2025-03-18 VITALS
WEIGHT: 192.4 LBS | SYSTOLIC BLOOD PRESSURE: 120 MMHG | RESPIRATION RATE: 16 BRPM | DIASTOLIC BLOOD PRESSURE: 80 MMHG | OXYGEN SATURATION: 96 % | BODY MASS INDEX: 30.2 KG/M2 | HEIGHT: 67 IN | HEART RATE: 56 BPM | TEMPERATURE: 97.7 F

## 2025-03-18 DIAGNOSIS — Z00.00 MEDICARE ANNUAL WELLNESS VISIT, SUBSEQUENT: Primary | ICD-10-CM

## 2025-03-18 DIAGNOSIS — N18.31 STAGE 3A CHRONIC KIDNEY DISEASE (HCC): ICD-10-CM

## 2025-03-18 DIAGNOSIS — R60.0 BILATERAL LOWER EXTREMITY EDEMA: ICD-10-CM

## 2025-03-18 DIAGNOSIS — I35.9 AORTIC VALVE CALCIFICATION: ICD-10-CM

## 2025-03-18 DIAGNOSIS — Z86.711 HISTORY OF PULMONARY EMBOLUS (PE): ICD-10-CM

## 2025-03-18 DIAGNOSIS — I82.501 CHRONIC DEEP VEIN THROMBOSIS (DVT) OF RIGHT LOWER EXTREMITY, UNSPECIFIED VEIN (HCC): ICD-10-CM

## 2025-03-18 DIAGNOSIS — I35.0 AORTIC VALVE STENOSIS, ETIOLOGY OF CARDIAC VALVE DISEASE UNSPECIFIED: ICD-10-CM

## 2025-03-18 DIAGNOSIS — E78.5 HYPERLIPIDEMIA, UNSPECIFIED HYPERLIPIDEMIA TYPE: ICD-10-CM

## 2025-03-18 DIAGNOSIS — C61 MALIGNANT NEOPLASM OF PROSTATE (HCC): ICD-10-CM

## 2025-03-18 DIAGNOSIS — Z79.01 CHRONIC ANTICOAGULATION: ICD-10-CM

## 2025-03-18 PROBLEM — S31.819A BUTTOCK WOUND, RIGHT, INITIAL ENCOUNTER: Status: RESOLVED | Noted: 2024-09-15 | Resolved: 2025-03-18

## 2025-03-18 PROCEDURE — G2211 COMPLEX E/M VISIT ADD ON: HCPCS | Performed by: FAMILY MEDICINE

## 2025-03-18 PROCEDURE — 99214 OFFICE O/P EST MOD 30 MIN: CPT | Performed by: FAMILY MEDICINE

## 2025-03-18 PROCEDURE — G0439 PPPS, SUBSEQ VISIT: HCPCS | Performed by: FAMILY MEDICINE

## 2025-03-18 NOTE — PROGRESS NOTES
Name: Daryn Goss      : 1940      MRN: [id number]  Encounter Provider: Ashlee Fleming DO  Encounter Date: 3/18/2025   Encounter department: [facility]    Assessment & Plan  Medicare annual wellness visit, subsequent         Bilateral lower extremity edema  Pt tried compression socks, but too hard to get on and off- I advised try mens knee-high socks with 3% Lycra  Recheck echo - last was   Cardiol eval/treat  Orders:    Echo complete w/ contrast if indicated; Future    Ambulatory Referral to Cardiology; Future    Aortic valve stenosis, etiology of cardiac valve disease unspecified    Orders:    Echo complete w/ contrast if indicated; Future    Ambulatory Referral to Cardiology; Future    Aortic valve calcification    Orders:    Echo complete w/ contrast if indicated; Future    Ambulatory Referral to Cardiology; Future    Malignant neoplasm of prostate (HCC)         Stage 3a chronic kidney disease (HCC)  Lab Results   Component Value Date    EGFR 54 10/29/2024    EGFR 44 10/18/2024    EGFR 37 2024    CREATININE 1.20 10/29/2024    CREATININE 1.41 (H) 10/18/2024    CREATININE 1.65 (H) 2024            Chronic deep vein thrombosis (DVT) of right lower extremity, unspecified vein (HCC)         Chronic anticoagulation         History of pulmonary embolus (PE)         Hyperlipidemia, unspecified hyperlipidemia type    Orders:    Lipid Panel with Direct LDL reflex; Future       Preventive health issues were discussed with patient, and age appropriate screening tests were ordered as noted in patient's After Visit Summary. Personalized health advice and appropriate referrals for health education or preventive services given if needed, as noted in patient's After Visit Summary.  Chief Complaint   Patient presents with    Medicare Wellness Visit    Foot Swelling     Bilateral foot swelling that started about six months ago       History of Present Illness     Medicare AWV +  f/u   Also c/o constant b/l ankle and feet swollen for past 6 months   states doesn't bother him, does not resolve with leg elevation  Has longstanding LE edema dx, but pt states now constant  Denies any SOB or CP sx  Tried compression socks, but too hard to get on and off    Narrative & Impression  Sinus rhythm with sinus arrhythmia with 1st degree A-V block  Otherwise normal ECG  When compared with ECG of 18-APR-2022 23:05,  No significant change was found  Confirmed by Casey Lindsey ([id number]) on 9/16/2024 12:12:54 PM  Specimen Collected: 09/16/24 4/19/2022  ECHO Complete With Contrast If Indicated  History  HTN, HLD, Sepsis  Interpretation Summary  Show Result Comparison   ·  Left Ventricle: Left ventricular cavity size is normal. There is mild concentric hypertrophy. The left ventricular ejection fraction is 60%. Systolic function is normal. Wall motion is normal.  ·  Aortic Valve: The aortic valve is trileaflet. The leaflets are moderately calcified. There is mildly reduced mobility. There is minimal stenosis. The aortic valve peak velocity is 1.9 m/s.           Patient Care Team:  Ashlee Fleming DO as PCP - General (Family Medicine)  Ju Iqbal MD (Vascular Surgery)  Gabriel Amaya DO (Nephrology)    Review of Systems  Medical History Reviewed by provider this encounter:       Annual Wellness Visit Questionnaire   Daryn is here for his Subsequent Wellness visit. Last Medicare Wellness visit information reviewed, patient interviewed and updates made to the record today.      Health Risk Assessment:   Patient rates overall health as very good. Patient feels that their physical health rating is same. Patient is satisfied with their life. Eyesight was rated as same. Hearing was rated as same. Patient feels that their emotional and mental health rating is same. Patients states they are never, rarely angry. Patient states they are never, rarely unusually tired/fatigued. Pain experienced in the  last 7 days has been none. Patient states that he has experienced no weight loss or gain in last 6 months.     Depression Screening:   PHQ-2 Score: 0      Fall Risk Screening:   In the past year, patient has experienced: history of falling in past year    Number of falls: 2 or more  Injured during fall?: Yes    Feels unsteady when standing or walking?: Yes    Worried about falling?: Yes      Home Safety:  Patient does not have trouble with stairs inside or outside of their home. Patient has working smoke alarms and has working carbon monoxide detector. Home safety hazards include: none.     Nutrition:   Current diet is Regular and Limited junk food.     Medications:   Patient is currently taking over-the-counter supplements. OTC medications include: see medication list. Patient is able to manage medications.     Activities of Daily Living (ADLs)/Instrumental Activities of Daily Living (IADLs):   Walk and transfer into and out of bed and chair?: Yes  Dress and groom yourself?: Yes    Bathe or shower yourself?: Yes    Feed yourself? Yes  Do your laundry/housekeeping?: Yes  Manage your money, pay your bills and track your expenses?: Yes  Make your own meals?: Yes    Do your own shopping?: Yes    Previous Hospitalizations:   Any hospitalizations or ED visits within the last 12 months?: Yes    How many hospitalizations have you had in the last year?: 1-2    Advance Care Planning:   Living will: Yes    Advanced directive: Yes      Cognitive Screening:   Provider or family/friend/caregiver concerned regarding cognition?: No    PREVENTIVE SCREENINGS      Cardiovascular Screening:    General: Screening Not Indicated and History Lipid Disorder      Diabetes Screening:     General: Screening Current      Colorectal Cancer Screening:     General: Screening Not Indicated      Prostate Cancer Screening:    General: History Prostate Cancer and Screening Not Indicated      Osteoporosis Screening:    General: Screening Not  "Indicated      Abdominal Aortic Aneurysm (AAA) Screening:    Risk factors include: tobacco use        General: Screening Current      Lung Cancer Screening:     General: Screening Not Indicated      Hepatitis C Screening:    General: Screening Not Indicated    Screening, Brief Intervention, and Referral to Treatment (SBIRT)     Screening  Typical number of drinks in a day: 0  Typical number of drinks in a week: 0  Interpretation: Low risk drinking behavior.    Single Item Drug Screening:  How often have you used an illegal drug (including marijuana) or a prescription medication for non-medical reasons in the past year? never    Single Item Drug Screen Score: 0  Interpretation: Negative screen for possible drug use disorder    Social Drivers of Health     Financial Resource Strain: Low Risk  (3/10/2023)    Overall Financial Resource Strain (CARDIA)     Difficulty of Paying Living Expenses: Not very hard   Food Insecurity: No Food Insecurity (3/18/2025)    Hunger Vital Sign     Worried About Running Out of Food in the Last Year: Never true     Ran Out of Food in the Last Year: Never true   Transportation Needs: No Transportation Needs (3/18/2025)    PRAPARE - Transportation     Lack of Transportation (Medical): No     Lack of Transportation (Non-Medical): No   Housing Stability: Low Risk  (3/18/2025)    Housing Stability Vital Sign     Unable to Pay for Housing in the Last Year: No     Number of Times Moved in the Last Year: 0     Homeless in the Last Year: No   Utilities: Not At Risk (3/18/2025)    [facility] Utilities     Threatened with loss of utilities: No     No results found.    Objective   /80 (BP Location: Left arm, Patient Position: Sitting, Cuff Size: Large)   Pulse 56   Temp 97.7 °F (36.5 °C) (Tympanic)   Resp 16   Ht 5' 7\" (1.702 m)   Wt 87.3 kg (192 lb 6.4 oz)   SpO2 96%   BMI 30.13 kg/m²     Physical Exam  Vitals and nursing note reviewed.   Constitutional:       General: He is not in acute " distress.     Appearance: He is well-groomed. He is not ill-appearing, toxic-appearing or diaphoretic.   HENT:      Head: Normocephalic and atraumatic.      Mouth/Throat:      Mouth: Mucous membranes are moist.      Pharynx: Oropharynx is clear.   Cardiovascular:      Rate and Rhythm: Normal rate and regular rhythm.      Pulses: Normal pulses.      Heart sounds: Murmur heard.      No friction rub. No gallop.   Pulmonary:      Effort: Pulmonary effort is normal.      Breath sounds: Normal breath sounds and air entry.   Abdominal:      General: Bowel sounds are normal.      Palpations: Abdomen is soft. There is no hepatomegaly, splenomegaly or mass.      Tenderness: There is no abdominal tenderness.   Musculoskeletal:      Right lower le+ Edema present.      Left lower le+ Edema present.   Neurological:      General: No focal deficit present.      Mental Status: He is alert and oriented to person, place, and time.   Psychiatric:         Mood and Affect: Mood normal.         Behavior: Behavior normal. Behavior is cooperative.         Cognition and Memory: Cognition and memory normal.

## 2025-03-18 NOTE — ASSESSMENT & PLAN NOTE
Pt tried compression socks, but too hard to get on and off- I advised try mens knee-high socks with 3% Lycra  Recheck echo - last was 2022  Cardiol eval/treat  Orders:    Echo complete w/ contrast if indicated; Future    Ambulatory Referral to Cardiology; Future     Other

## 2025-03-18 NOTE — PATIENT INSTRUCTIONS
Start wearing mens knee-high socks with 3% Lycra (eg Gold[employer]e brand)      Medicare Preventive Visit Patient Instructions  Thank you for completing your Welcome to Medicare Visit or Medicare Annual Wellness Visit today. Your next wellness visit will be due in one year (3/19/2026).  The screening/preventive services that you may require over the next 5-10 years are detailed below. Some tests may not apply to you based off risk factors and/or age. Screening tests ordered at today's visit but not completed yet may show as past due. Also, please note that scanned in results may not display below.  Preventive Screenings:  Service Recommendations Previous Testing/Comments   Colorectal Cancer Screening  Colonoscopy    Fecal Occult Blood Test (FOBT)/Fecal Immunochemical Test (FIT)  Fecal DNA/Cologuard Test  Flexible Sigmoidoscopy Age: 45-75 years old   Colonoscopy: every 10 years (May be performed more frequently if at higher risk)  OR  FOBT/FIT: every 1 year  OR  Cologuard: every 3 years  OR  Sigmoidoscopy: every 5 years  Screening may be recommended earlier than age 45 if at higher risk for colorectal cancer. Also, an individualized decision between you and your healthcare provider will decide whether screening between the ages of 76-85 would be appropriate. Colonoscopy: Not on file  FOBT/FIT: Not on file  Cologuard: Not on file  Sigmoidoscopy: Not on file    Screening Not Indicated     Prostate Cancer Screening Individualized decision between patient and health care provider in men between ages of 55-69   Medicare will cover every 12 months beginning on the day after your 50th birthday PSA: 0.628 ng/mL     History Prostate Cancer  Screening Not Indicated     Hepatitis C Screening Once for adults born between 1945 and 1965  More frequently in patients at high risk for Hepatitis C Hep C Antibody: Not on file        Diabetes Screening 1-2 times per year if you're at risk for diabetes or have pre-diabetes Fasting glucose: 184  mg/dL (10/29/2024)  A1C: 6.6 % (2/20/2024)  Screening Current   Cholesterol Screening Once every 5 years if you don't have a lipid disorder. May order more often based on risk factors. Lipid panel: 02/20/2024  Screening Not Indicated  History Lipid Disorder      Other Preventive Screenings Covered by Medicare:  Abdominal Aortic Aneurysm (AAA) Screening: covered once if your at risk. You're considered to be at risk if you have a family history of AAA or a male between the age of 65-75 who smoking at least 100 cigarettes in your lifetime.  Lung Cancer Screening: covers low dose CT scan once per year if you meet all of the following conditions: (1) Age 55-77; (2) No signs or symptoms of lung cancer; (3) Current smoker or have quit smoking within the last 15 years; (4) You have a tobacco smoking history of at least 20 pack years (packs per day x number of years you smoked); (5) You get a written order from a healthcare provider.  Glaucoma Screening: covered annually if you're considered high risk: (1) You have diabetes OR (2) Family history of glaucoma OR (3)  aged 50 and older OR (4)  American aged 65 and older  Osteoporosis Screening: covered every 2 years if you meet one of the following conditions: (1) Have a vertebral abnormality; (2) On glucocorticoid therapy for more than 3 months; (3) Have primary hyperparathyroidism; (4) On osteoporosis medications and need to assess response to drug therapy.  HIV Screening: covered annually if you're between the age of 15-65. Also covered annually if you are younger than 15 and older than 65 with risk factors for HIV infection. For pregnant patients, it is covered up to 3 times per pregnancy.    Immunizations:  Immunization Recommendations   Influenza Vaccine Annual influenza vaccination during flu season is recommended for all persons aged >= 6 months who do not have contraindications   Pneumococcal Vaccine   * Pneumococcal conjugate vaccine = PCV13  (Prevnar 13), PCV15 (Vaxneuvance), PCV20 (Prevnar 20)  * Pneumococcal polysaccharide vaccine = PPSV23 (Pneumovax) Adults 19-63 yo with certain risk factors or if 65+ yo  If never received any pneumonia vaccine: recommend Prevnar 20 (PCV20)  Give PCV20 if previously received 1 dose of PCV13 or PPSV23   Hepatitis B Vaccine 3 dose series if at intermediate or high risk (ex: diabetes, end stage renal disease, liver disease)   Respiratory syncytial virus (RSV) Vaccine - COVERED BY MEDICARE PART D  * RSVPreF3 (Arexvy) CDC recommends that adults 60 years of age and older may receive a single dose of RSV vaccine using shared clinical decision-making (SCDM)   Tetanus (Td) Vaccine - COST NOT COVERED BY MEDICARE PART B Following completion of primary series, a booster dose should be given every 10 years to maintain immunity against tetanus. Td may also be given as tetanus wound prophylaxis.   Tdap Vaccine - COST NOT COVERED BY MEDICARE PART B Recommended at least once for all adults. For pregnant patients, recommended with each pregnancy.   Shingles Vaccine (Shingrix) - COST NOT COVERED BY MEDICARE PART B  2 shot series recommended in those 19 years and older who have or will have weakened immune systems or those 50 years and older     Health Maintenance Due:  There are no preventive care reminders to display for this patient.  Immunizations Due:  There are no preventive care reminders to display for this patient.  Advance Directives   What are advance directives?  Advance directives are legal documents that state your wishes and plans for medical care. These plans are made ahead of time in case you lose your ability to make decisions for yourself. Advance directives can apply to any medical decision, such as the treatments you want, and if you want to donate organs.   What are the types of advance directives?  There are many types of advance directives, and each state has rules about how to use them. You may choose a  combination of any of the following:  Living will:  This is a written record of the treatment you want. You can also choose which treatments you do not want, which to limit, and which to stop at a certain time. This includes surgery, medicine, IV fluid, and tube feedings.   Durable power of  for healthcare (DPAHC):  This is a written record that states who you want to make healthcare choices for you when you are unable to make them for yourself. This person, called a proxy, is usually a family member or a friend. You may choose more than 1 proxy.  Do not resuscitate (DNR) order:  A DNR order is used in case your heart stops beating or you stop breathing. It is a request not to have certain forms of treatment, such as CPR. A DNR order may be included in other types of advance directives.  Medical directive:  This covers the care that you want if you are in a coma, near death, or unable to make decisions for yourself. You can list the treatments you want for each condition. Treatment may include pain medicine, surgery, blood transfusions, dialysis, IV or tube feedings, and a ventilator (breathing machine).  Values history:  This document has questions about your views, beliefs, and how you feel and think about life. This information can help others choose the care that you would choose.  Why are advance directives important?  An advance directive helps you control your care. Although spoken wishes may be used, it is better to have your wishes written down. Spoken wishes can be misunderstood, or not followed. Treatments may be given even if you do not want them. An advance directive may make it easier for your family to make difficult choices about your care.   Weight Management   Why it is important to manage your weight:  Being overweight increases your risk of health conditions such as heart disease, high blood pressure, type 2 diabetes, and certain types of cancer. It can also increase your risk for  osteoarthritis, sleep apnea, and other respiratory problems. Aim for a slow, steady weight loss. Even a small amount of weight loss can lower your risk of health problems.  How to lose weight safely:  A safe and healthy way to lose weight is to eat fewer calories and get regular exercise. You can lose up about 1 pound a week by decreasing the number of calories you eat by 500 calories each day.   Healthy meal plan for weight management:  A healthy meal plan includes a variety of foods, contains fewer calories, and helps you stay healthy. A healthy meal plan includes the following:  Eat whole-grain foods more often.  A healthy meal plan should contain fiber. Fiber is the part of grains, fruits, and vegetables that is not broken down by your body. Whole-grain foods are healthy and provide extra fiber in your diet. Some examples of whole-grain foods are whole-wheat breads and pastas, oatmeal, brown rice, and bulgur.  Eat a variety of vegetables every day.  Include dark, leafy greens such as spinach, kale, mark greens, and mustard greens. Eat yellow and orange vegetables such as carrots, sweet potatoes, and winter squash.   Eat a variety of fruits every day.  Choose fresh or canned fruit (canned in its own juice or light syrup) instead of juice. Fruit juice has very little or no fiber.  Eat low-fat dairy foods.  Drink fat-free (skim) milk or 1% milk. Eat fat-free yogurt and low-fat cottage cheese. Try low-fat cheeses such as mozzarella and other reduced-fat cheeses.  Choose meat and other protein foods that are low in fat.  Choose beans or other legumes such as split peas or lentils. Choose fish, skinless poultry (chicken or turkey), or lean cuts of red meat (beef or pork). Before you cook meat or poultry, cut off any visible fat.   Use less fat and oil.  Try baking foods instead of frying them. Add less fat, such as margarine, sour cream, regular salad dressing and mayonnaise to foods. Eat fewer high-fat foods. Some  examples of high-fat foods include french fries, doughnuts, ice cream, and cakes.  Eat fewer sweets.  Limit foods and drinks that are high in sugar. This includes candy, cookies, regular soda, and sweetened drinks.  Exercise:  Exercise at least 30 minutes per day on most days of the week. Some examples of exercise include walking, biking, dancing, and swimming. You can also fit in more physical activity by taking the stairs instead of the elevator or parking farther away from stores. Ask your healthcare provider about the best exercise plan for you.      © Copyright [employer] 2018 Information is for End User's use only and may not be sold, redistributed or otherwise used for commercial purposes. All illustrations and images included in CareNotes® are the copyrighted property of [employer] or [employer]

## 2025-03-18 NOTE — ASSESSMENT & PLAN NOTE
Lab Results   Component Value Date    EGFR 54 10/29/2024    EGFR 44 10/18/2024    EGFR 37 09/13/2024    CREATININE 1.20 10/29/2024    CREATININE 1.41 (H) 10/18/2024    CREATININE 1.65 (H) 09/13/2024

## 2025-04-07 ENCOUNTER — TELEPHONE (OUTPATIENT)
Age: 85
End: 2025-04-07

## 2025-04-07 NOTE — TELEPHONE ENCOUNTER
Rec'd call from patient requesting to schedule NEW for GLUTEAL CYST. Not infected.    Patient resides in [place]. Informed him that our closest location would be [place].    Patient states that he was to be seen at [facility]. I informed him that we do not have any providers in that area. Patient states that's where he wants to be seen.    I encouraged patient to reach out to PCP to see who they would recommend outside of [facility] in the area patient is requesting.    Patient verbalized understanding.

## 2025-04-21 ENCOUNTER — HOSPITAL ENCOUNTER (OUTPATIENT)
Dept: NON INVASIVE DIAGNOSTICS | Facility: HOSPITAL | Age: 85
Discharge: HOME | End: 2025-04-21
Payer: COMMERCIAL

## 2025-04-21 VITALS
SYSTOLIC BLOOD PRESSURE: 120 MMHG | WEIGHT: 192 LBS | HEART RATE: 65 BPM | DIASTOLIC BLOOD PRESSURE: 80 MMHG | HEIGHT: 67 IN | BODY MASS INDEX: 30.13 KG/M2

## 2025-04-21 DIAGNOSIS — R60.0 BILATERAL LOWER EXTREMITY EDEMA: ICD-10-CM

## 2025-04-21 DIAGNOSIS — I35.9 AORTIC VALVE CALCIFICATION: ICD-10-CM

## 2025-04-21 DIAGNOSIS — I35.0 AORTIC VALVE STENOSIS, ETIOLOGY OF CARDIAC VALVE DISEASE UNSPECIFIED: ICD-10-CM

## 2025-04-21 LAB
AORTIC ROOT: 2.9 CM
AORTIC VALVE MEAN VELOCITY: 13 M/S
ASCENDING AORTA: 2.7 CM
AV AREA BY CONTINUOUS VTI: 1.7 CM2
AV AREA PEAK VELOCITY: 1.7 CM2
AV LVOT MEAN GRADIENT: 2 MMHG
AV LVOT PEAK GRADIENT: 4 MMHG
AV MEAN PRESS GRAD SYS DOP V1V2: 7 MMHG
AV ORIFICE AREA US: 1.71 CM2
AV PEAK GRADIENT: 14 MMHG
AV VELOCITY RATIO: 0.54
AV VMAX SYS DOP: 1.89 M/S
BSA FOR ECHO PROCEDURE: 1.99 M2
DOP CALC AO VTI: 40.44 CM
DOP CALC LVOT AREA: 3.14 CM2
DOP CALC LVOT CARDIAC INDEX: 2.57 L/MIN/M2
DOP CALC LVOT CARDIAC OUTPUT: 5.12 L/MIN
DOP CALC LVOT DIAMETER: 2 CM
DOP CALC LVOT PEAK VEL VTI: 22 CM
DOP CALC LVOT PEAK VEL: 1.02 M/S
DOP CALC LVOT STROKE INDEX: 34.7 ML/M2
DOP CALC LVOT STROKE VOLUME: 69.08
E WAVE DECELERATION TIME: 385 MS
E/A RATIO: 0.68
FRACTIONAL SHORTENING: 33 (ref 28–44)
INTERVENTRICULAR SEPTUM IN DIASTOLE (PARASTERNAL SHORT AXIS VIEW): 1 CM
INTERVENTRICULAR SEPTUM: 1 CM (ref 0.6–1.1)
LAAS-AP2: 13.8 CM2
LAAS-AP4: 14.8 CM2
LEFT ATRIUM SIZE: 3.8 CM
LEFT ATRIUM VOLUME (MOD BIPLANE): 35 ML
LEFT ATRIUM VOLUME INDEX (MOD BIPLANE): 17.6 ML/M2
LEFT INTERNAL DIMENSION IN SYSTOLE: 3.3 CM (ref 2.1–4)
LEFT VENTRICULAR INTERNAL DIMENSION IN DIASTOLE: 4.9 CM (ref 3.5–6)
LEFT VENTRICULAR POSTERIOR WALL IN END DIASTOLE: 1 CM
LEFT VENTRICULAR STROKE VOLUME: 68 ML
LV EF US.2D.A4C+ESTIMATED: 56 %
LVSV (TEICH): 68 ML
MV E'TISSUE VEL-SEP: 8 CM/S
MV PEAK A VEL: 1.03 M/S
MV PEAK E VEL: 70 CM/S
MV STENOSIS PRESSURE HALF TIME: 112 MS
MV VALVE AREA P 1/2 METHOD: 1.96
RIGHT ATRIUM AREA SYSTOLE A4C: 12.7 CM2
RIGHT VENTRICLE ID DIMENSION: 3.5 CM
SL CV LEFT ATRIUM LENGTH A2C: 4.5 CM
SL CV LV EF: 60
SL CV PED ECHO LEFT VENTRICLE DIASTOLIC VOLUME (MOD BIPLANE) 2D: 113 ML
SL CV PED ECHO LEFT VENTRICLE SYSTOLIC VOLUME (MOD BIPLANE) 2D: 45 ML
TR MAX PG: 27 MMHG
TR PEAK VELOCITY: 2.6 M/S
TRICUSPID ANNULAR PLANE SYSTOLIC EXCURSION: 2.5 CM
TRICUSPID VALVE PEAK REGURGITATION VELOCITY: 2.61 M/S

## 2025-04-21 PROCEDURE — 93306 TTE W/DOPPLER COMPLETE: CPT

## 2025-04-21 PROCEDURE — 93306 TTE W/DOPPLER COMPLETE: CPT | Performed by: INTERNAL MEDICINE

## 2025-04-25 ENCOUNTER — APPOINTMENT (OUTPATIENT)
Dept: LAB | Facility: CLINIC | Age: 85
End: 2025-04-25
Attending: FAMILY MEDICINE
Payer: COMMERCIAL

## 2025-04-25 DIAGNOSIS — C61 MALIGNANT NEOPLASM OF PROSTATE (HCC): ICD-10-CM

## 2025-04-25 DIAGNOSIS — E78.5 HYPERLIPIDEMIA, UNSPECIFIED HYPERLIPIDEMIA TYPE: ICD-10-CM

## 2025-04-25 LAB
CHOLEST SERPL-MCNC: 186 MG/DL (ref ?–200)
HDLC SERPL-MCNC: 74 MG/DL
LDLC SERPL CALC-MCNC: 89 MG/DL (ref 0–100)
PSA SERPL-MCNC: 3.05 NG/ML (ref 0–4)
TRIGL SERPL-MCNC: 115 MG/DL (ref ?–150)

## 2025-04-25 PROCEDURE — G0103 PSA SCREENING: HCPCS

## 2025-04-25 PROCEDURE — 80061 LIPID PANEL: CPT

## 2025-04-25 PROCEDURE — 36415 COLL VENOUS BLD VENIPUNCTURE: CPT

## 2025-04-30 ENCOUNTER — LAB REQUISITION (OUTPATIENT)
Dept: LAB | Facility: HOSPITAL | Age: 85
End: 2025-04-30
Payer: COMMERCIAL

## 2025-04-30 DIAGNOSIS — R31.29 OTHER MICROSCOPIC HEMATURIA: ICD-10-CM

## 2025-04-30 LAB
BACTERIA UR QL AUTO: ABNORMAL /HPF
BILIRUB UR QL STRIP: NEGATIVE
CLARITY UR: CLEAR
COLOR UR: ABNORMAL
GLUCOSE UR STRIP-MCNC: NEGATIVE MG/DL
HGB UR QL STRIP.AUTO: ABNORMAL
KETONES UR STRIP-MCNC: NEGATIVE MG/DL
LEUKOCYTE ESTERASE UR QL STRIP: NEGATIVE
NITRITE UR QL STRIP: NEGATIVE
NON-SQ EPI CELLS URNS QL MICRO: ABNORMAL /HPF
PH UR STRIP.AUTO: 6 [PH]
PROT UR STRIP-MCNC: ABNORMAL MG/DL
RBC #/AREA URNS AUTO: ABNORMAL /HPF
SP GR UR STRIP.AUTO: 1.02 (ref 1–1.03)
UROBILINOGEN UR STRIP-ACNC: <2 MG/DL
WBC #/AREA URNS AUTO: ABNORMAL /HPF

## 2025-04-30 PROCEDURE — 81001 URINALYSIS AUTO W/SCOPE: CPT | Performed by: UROLOGY

## 2025-05-20 ENCOUNTER — CONSULT (OUTPATIENT)
Dept: CARDIOLOGY CLINIC | Facility: CLINIC | Age: 85
End: 2025-05-20
Payer: COMMERCIAL

## 2025-05-20 VITALS
WEIGHT: 187 LBS | SYSTOLIC BLOOD PRESSURE: 140 MMHG | HEART RATE: 60 BPM | BODY MASS INDEX: 29.35 KG/M2 | HEIGHT: 67 IN | DIASTOLIC BLOOD PRESSURE: 80 MMHG

## 2025-05-20 DIAGNOSIS — Z86.718 HISTORY OF DEEP VENOUS THROMBOSIS (DVT) OF DISTAL VEIN OF RIGHT LOWER EXTREMITY: ICD-10-CM

## 2025-05-20 DIAGNOSIS — I70.213 ATHEROSCLEROSIS OF NATIVE ARTERIES OF EXTREMITIES WITH INTERMITTENT CLAUDICATION, BILATERAL LEGS (HCC): ICD-10-CM

## 2025-05-20 DIAGNOSIS — I10 ESSENTIAL HYPERTENSION: ICD-10-CM

## 2025-05-20 DIAGNOSIS — I35.0 NONRHEUMATIC AORTIC VALVE STENOSIS: ICD-10-CM

## 2025-05-20 DIAGNOSIS — E78.2 MIXED HYPERLIPIDEMIA: Primary | ICD-10-CM

## 2025-05-20 PROCEDURE — 99204 OFFICE O/P NEW MOD 45 MIN: CPT | Performed by: PHYSICIAN ASSISTANT

## 2025-05-20 NOTE — PROGRESS NOTES
[facility]   Outpatient Note  Daryn Goss  1940  [id number]  [facility] [address] [address] [phone]    Daryn Goss is a 84 y.o. male    Assessment and Plan:     Hyperlipidemia    Tolerating statin therapy  Continue pravastatin 40 mg daily    Aortic valve stenosis    Mild aortic stenosis per echocardiogram 4/21/2025  We will continue routine surveillance    Essential hypertension    Controlled on current medical regimen  Continue lisinopril hydrochlorothiazide 20/25 mg daily    History of deep venous thrombosis (DVT) of distal vein of right lower extremity    On Eliquis 5 mg twice daily    Atherosclerosis of native arteries of extremities with intermittent claudication, bilateral legs (HCC)  Limiting exercise capacity      Additional Plan:   No Medication changes made or testing ordered today.     Available lab and test results are reviewed with the patient as noted.    Return visit will be in 1 year or earlier if there are problems.     The patient is encouraged to call in the meantime if there are questions or concerns.      Subjective:   The patient is seen in the office today for a new patient visit.  He has a PMH of HTN HLD AAS prostate cancer and claudication with atherosclerosis in the lower extremities.  He does have a history of DVT and is on Eliquis BID.  Patient is on lisinopril hydrochlorothiazide for blood pressure control and pravastatin for cholesterol control.  He is tolerating his medications well.  His only complaint is that of leg pain when he walks and he uses a walker for stability.  He does have some mild shortness of breath when climbing stairs but does not have to stop midway to the catch his breath.    Otherwise from a cardiac perspective he denies any chest pain palpitations dizziness lightheadedness or syncope.  He denies any TIA or CVA symptoms.  He has no  edema orthopnea or PND.        Social History  Tobacco Use History[1],   Social History     Substance and Sexual Activity   Alcohol Use Not Currently   ,   Social History     Substance and Sexual Activity   Drug Use Not Currently     Family History   Problem Relation Age of Onset    Lung cancer Mother     Ulcers Father     No Known Problems Sister     Heart attack Brother     Alcohol abuse Brother     Kidney disease Brother        Medical and Surgical History  Past Medical History:   Diagnosis Date    Acute kidney injury (HCC) 04/18/2022    PATRICK (acute kidney injury) (HCC) 04/18/2022    BMI 28.0-28.9,adult 02/04/2021    BMI 30.0-30.9,adult 03/15/2023    Buttock wound, right, initial encounter 09/15/2024    Fracture of rib of left side with routine healing 05/11/2022    Hx of blood clots     Hyperlipidemia     Hypertension     Influenza A 04/18/2022    Sepsis (HCC) 04/18/2022     Past Surgical History:   Procedure Laterality Date    HERNIA REPAIR      IR ABDOMINAL AORTAGRAM  8/3/2022    KNEE ARTHROSCOPY Right 07/31/2015    CO TRURL ELECTROSURG RESCJ PROSTATE BLEED COMPLETE N/A 10/28/2024    Procedure: CYSTOSCOPY; TRANSURETHRAL RESECTION OF PROSTATE (TURP);  Surgeon: Donnell Delgado MD;  Location: [facility];  Service: Urology    PROSTATE SURGERY         Current Medications[2]  No Known Allergies    Review of Systems   Constitutional: Negative.   HENT: Negative.     Eyes: Negative.    Cardiovascular:  Positive for claudication and dyspnea on exertion. Negative for chest pain, cyanosis, irregular heartbeat, leg swelling, near-syncope, orthopnea, palpitations, paroxysmal nocturnal dyspnea and syncope.   Respiratory: Negative.  Negative for cough, hemoptysis, shortness of breath, sleep disturbances due to breathing, snoring, sputum production and wheezing.    Endocrine: Negative.    Hematologic/Lymphatic: Negative.    Skin: Negative.    Musculoskeletal: Negative.         Uses a walker to ambulate   Gastrointestinal:  "Negative.    Genitourinary: Negative.    Neurological: Negative.    Psychiatric/Behavioral: Negative.     Allergic/Immunologic: Negative.        Objective:   /80   Pulse 60   Ht 5' 7\" (1.702 m)   Wt 84.8 kg (187 lb)   BMI 29.29 kg/m²   Physical Exam  Vitals and nursing note reviewed.   Constitutional:       Appearance: He is well-developed.   HENT:      Head: Normocephalic and atraumatic.      Mouth/Throat:      Mouth: Mucous membranes are moist.     Eyes:      General: No scleral icterus.     Conjunctiva/sclera: Conjunctivae normal.     Neck:      Thyroid: No thyromegaly.      Vascular: No JVD.     Cardiovascular:      Rate and Rhythm: Normal rate and regular rhythm.      Heart sounds: Normal heart sounds, S1 normal and S2 normal. No murmur heard.     No friction rub. No gallop.   Pulmonary:      Effort: No respiratory distress.      Breath sounds: No wheezing or rales.   Abdominal:      General: Bowel sounds are normal. There is no distension.      Palpations: Abdomen is soft.      Tenderness: There is no abdominal tenderness.      Comments: Aorta not palpable     Musculoskeletal:         General: No tenderness or deformity. Normal range of motion.      Cervical back: Normal range of motion and neck supple.      Right lower leg: No edema.      Left lower leg: No edema.      Comments: Uses a walker to ambulate     Skin:     General: Skin is warm and dry.     Neurological:      General: No focal deficit present.      Mental Status: He is alert and oriented to person, place, and time.     Psychiatric:         Mood and Affect: Mood normal.         Behavior: Behavior normal.         Judgment: Judgment normal.         Lab Review:   No results found for: \"CHOL\"  Lab Results   Component Value Date    HDL 74 04/25/2025     Lab Results   Component Value Date    LDLCALC 89 04/25/2025     Lab Results   Component Value Date    TRIG 115 04/25/2025     Results Reviewed       None          Results Reviewed       None "          Results Reviewed       None            Recent Cardiovascular Testing:   Echo 2025 normal LVEF 60% mild AAS mild MAC mild MR mild TR    ECG Review:   2024: Sinus rhythm with sinus arrhythmia with 1st degree A-V block                [1]   Social History  Tobacco Use   Smoking Status Former    Current packs/day: 0.00    Types: Cigarettes    Quit date:     Years since quittin.4   Smokeless Tobacco Never   Tobacco Comments    2+ppd x 20 years    [2]   Current Outpatient Medications:     apixaban (ELIQUIS) 5 mg, Take 1 tablet (5 mg total) by mouth 2 (two) times a day, Disp: 60 tablet, Rfl: 0    ascorbic Acid (VITAMIN C) 500 MG CPCR, Take 500 mg by mouth in the morning., Disp: , Rfl:     ASPIRIN 81 PO, Take 81 mg by mouth in the morning, Disp: , Rfl:     b complex vitamins capsule, Take 1 capsule by mouth in the morning., Disp: , Rfl:     cholecalciferol (VITAMIN D3) 400 units tablet, Take 800 Units by mouth in the morning., Disp: , Rfl:     cilostazol (PLETAL) 100 mg tablet, Take 1 tablet (100 mg total) by mouth 2 (two) times a day, Disp: 180 tablet, Rfl: 4    diltiazem (DILACOR XR) 240 MG 24 hr capsule, Take 1 capsule (240 mg total) by mouth daily, Disp: 90 capsule, Rfl: 1    lisinopril-hydrochlorothiazide (PRINZIDE,ZESTORETIC) 20-25 MG per tablet, Take 1 tablet by mouth in the morning., Disp: , Rfl:     magnesium 30 MG tablet, Take 30 mg by mouth in the morning., Disp: , Rfl:     Omega-3 Fatty Acids (Fish Oil) 1200 MG CAPS, Take by mouth, Disp: , Rfl:     patient supplied medication, Take 2 each by mouth in the morning RED BEET EXTRACT, Disp: , Rfl:     Potassium 99 MG TABS, Take 1 tablet by mouth in the morning., Disp: , Rfl:     pravastatin (PRAVACHOL) 40 mg tablet, Take 40 mg by mouth daily at bedtime, Disp: , Rfl:     vitamin E, tocopherol, 200 units capsule, Take 200 Units by mouth in the morning., Disp: , Rfl:

## 2025-08-12 ENCOUNTER — TRANSCRIBE ORDERS (OUTPATIENT)
Dept: LAB | Facility: CLINIC | Age: 85
End: 2025-08-12

## 2025-08-12 ENCOUNTER — APPOINTMENT (OUTPATIENT)
Dept: LAB | Facility: CLINIC | Age: 85
End: 2025-08-12
Payer: COMMERCIAL

## 2025-08-20 ENCOUNTER — HOSPITAL ENCOUNTER (OUTPATIENT)
Dept: NON INVASIVE DIAGNOSTICS | Facility: HOSPITAL | Age: 85
Discharge: HOME | End: 2025-08-20
Attending: SURGERY
Payer: COMMERCIAL

## 2025-08-20 DIAGNOSIS — I70.213 ATHEROSCLEROSIS OF NATIVE ARTERIES OF EXTREMITIES WITH INTERMITTENT CLAUDICATION, BILATERAL LEGS (HCC): ICD-10-CM

## 2025-08-20 PROCEDURE — 93925 LOWER EXTREMITY STUDY: CPT

## 2025-08-20 PROCEDURE — 93923 UPR/LXTR ART STDY 3+ LVLS: CPT

## 2025-08-21 PROCEDURE — 93922 UPR/L XTREMITY ART 2 LEVELS: CPT | Performed by: SURGERY

## 2025-08-21 PROCEDURE — 93925 LOWER EXTREMITY STUDY: CPT | Performed by: SURGERY

## 2025-08-22 ENCOUNTER — TELEPHONE (OUTPATIENT)
Dept: VASCULAR SURGERY | Facility: CLINIC | Age: 85
End: 2025-08-22

## (undated) DEVICE — BAG DRAINAGE UROCATCHER 4 SKYTRON

## (undated) DEVICE — Device

## (undated) DEVICE — 4-PORT MANIFOLD: Brand: NEPTUNE 2

## (undated) DEVICE — TOWEL SURG XR DETECT GREEN STRL RFD

## (undated) DEVICE — CATH FOLEY COUNCIL 22FR 5ML 2 WAY LUBRICATH

## (undated) DEVICE — BAG DRAINAGE URINARY W TOWER

## (undated) DEVICE — MEDI-VAC NON-CONDUCTIVE SUCTION TUBING 6MM X 1.8M (6FT.) L: Brand: CARDINAL HEALTH

## (undated) DEVICE — FIRST STEP BEDSIDE KIT - STAND-UP POUCH, ENDOSCOPIC CLEANING PAD - 1 POUCH: Brand: FIRST STEP BEDSIDE KIT - STAND-UP POUCH, ENDOSCOPIC CLEANING PAD

## (undated) DEVICE — SYRINGE 20ML LL

## (undated) DEVICE — SYRINGE,TOOMEY,IRRIGATION,70CC,STERILE: Brand: MEDLINE

## (undated) DEVICE — GLOVE SRG BIOGEL 7.5

## (undated) DEVICE — EVACUATOR BLADDER ELLIK DISP STRL

## (undated) DEVICE — HF-RESECTION ELECTRODE PLASMALOOP LOOP, MEDIUM, 24 FR., 12°-30°, ESG TURIS: Brand: OLYMPUS

## (undated) DEVICE — GAUZE SPONGES,16 PLY: Brand: CURITY

## (undated) DEVICE — 1071 S-DRP URO STLE-GAMA 10/BX,4X/C: Brand: STERI-DRAPE™

## (undated) DEVICE — LEGGINGS: Brand: CONVERTORS

## (undated) DEVICE — SPECIMEN CONTAINER STERILE PEEL PACK

## (undated) DEVICE — DISPOSABLE OR TOWEL: Brand: CARDINAL HEALTH

## (undated) DEVICE — LUBRICANT JELLY SURGILUBE TUBE 4OZ FLIP TOP

## (undated) DEVICE — PACK CUSTOM GU CYSTO PACK RF

## (undated) DEVICE — HF-RESECTION ELECTRODE PLASMABUTTON BUTTON, 24 FR., 12°-30°, ESG TURIS: Brand: OLYMPUS

## (undated) DEVICE — PREMIUM DRY TRAY LF: Brand: MEDLINE INDUSTRIES, INC.

## (undated) DEVICE — SET,IRRIGATION,CYSTO,Y-TYPE,90": Brand: MEDLINE

## (undated) DEVICE — GARMENT,MEDLINE,DVT,INT,CALF,FOAM,MED: Brand: MEDLINE

## (undated) DEVICE — DECANTER: Brand: UNBRANDED

## (undated) DEVICE — POV-IOD SOLUTION 4OZ BT